# Patient Record
Sex: FEMALE | Race: WHITE | NOT HISPANIC OR LATINO
[De-identification: names, ages, dates, MRNs, and addresses within clinical notes are randomized per-mention and may not be internally consistent; named-entity substitution may affect disease eponyms.]

---

## 2017-01-03 ENCOUNTER — RX RENEWAL (OUTPATIENT)
Age: 82
End: 2017-01-03

## 2017-02-01 ENCOUNTER — APPOINTMENT (OUTPATIENT)
Dept: INTERNAL MEDICINE | Facility: CLINIC | Age: 82
End: 2017-02-01

## 2017-02-01 VITALS
BODY MASS INDEX: 28.13 KG/M2 | DIASTOLIC BLOOD PRESSURE: 76 MMHG | TEMPERATURE: 99.5 F | SYSTOLIC BLOOD PRESSURE: 124 MMHG | OXYGEN SATURATION: 91 % | HEART RATE: 77 BPM | WEIGHT: 134 LBS | HEIGHT: 58 IN

## 2017-02-01 DIAGNOSIS — E55.9 VITAMIN D DEFICIENCY, UNSPECIFIED: ICD-10-CM

## 2017-02-01 DIAGNOSIS — R94.6 ABNORMAL RESULTS OF THYROID FUNCTION STUDIES: ICD-10-CM

## 2017-02-02 LAB
25(OH)D3 SERPL-MCNC: 35.3 NG/ML
ALBUMIN SERPL ELPH-MCNC: 4 G/DL
ALP BLD-CCNC: 89 U/L
ALT SERPL-CCNC: 11 U/L
ANION GAP SERPL CALC-SCNC: 13 MMOL/L
AST SERPL-CCNC: 20 U/L
BASOPHILS # BLD AUTO: 0.07 K/UL
BASOPHILS NFR BLD AUTO: 0.6 %
BILIRUB SERPL-MCNC: 0.3 MG/DL
BUN SERPL-MCNC: 25 MG/DL
CALCIUM SERPL-MCNC: 10.2 MG/DL
CHLORIDE SERPL-SCNC: 103 MMOL/L
CHOLEST SERPL-MCNC: 187 MG/DL
CHOLEST/HDLC SERPL: 2.6 RATIO
CO2 SERPL-SCNC: 26 MMOL/L
CREAT SERPL-MCNC: 1.19 MG/DL
EOSINOPHIL # BLD AUTO: 0.31 K/UL
EOSINOPHIL NFR BLD AUTO: 2.6 %
FOLATE SERPL-MCNC: 7.7 NG/ML
GLUCOSE SERPL-MCNC: 92 MG/DL
HBA1C MFR BLD HPLC: 6 %
HCT VFR BLD CALC: 39.8 %
HDLC SERPL-MCNC: 73 MG/DL
HGB BLD-MCNC: 12.4 G/DL
IMM GRANULOCYTES NFR BLD AUTO: 1 %
LDLC SERPL CALC-MCNC: 88 MG/DL
LYMPHOCYTES # BLD AUTO: 3.03 K/UL
LYMPHOCYTES NFR BLD AUTO: 25.6 %
MAN DIFF?: NORMAL
MCHC RBC-ENTMCNC: 29.5 PG
MCHC RBC-ENTMCNC: 31.2 GM/DL
MCV RBC AUTO: 94.8 FL
MONOCYTES # BLD AUTO: 1.22 K/UL
MONOCYTES NFR BLD AUTO: 10.3 %
NEUTROPHILS # BLD AUTO: 7.08 K/UL
NEUTROPHILS NFR BLD AUTO: 59.9 %
PLATELET # BLD AUTO: 228 K/UL
POTASSIUM SERPL-SCNC: 4.9 MMOL/L
PROT SERPL-MCNC: 7.1 G/DL
RBC # BLD: 4.2 M/UL
RBC # FLD: 14.7 %
SODIUM SERPL-SCNC: 142 MMOL/L
T4 SERPL-MCNC: 7.6 UG/DL
TRIGL SERPL-MCNC: 129 MG/DL
TSH SERPL-ACNC: 6.81 UIU/ML
VIT B12 SERPL-MCNC: 498 PG/ML
WBC # FLD AUTO: 11.83 K/UL

## 2017-02-07 ENCOUNTER — MEDICATION RENEWAL (OUTPATIENT)
Age: 82
End: 2017-02-07

## 2017-02-21 ENCOUNTER — RX RENEWAL (OUTPATIENT)
Age: 82
End: 2017-02-21

## 2017-02-22 ENCOUNTER — MEDICATION RENEWAL (OUTPATIENT)
Age: 82
End: 2017-02-22

## 2017-03-21 ENCOUNTER — APPOINTMENT (OUTPATIENT)
Dept: INTERNAL MEDICINE | Facility: CLINIC | Age: 82
End: 2017-03-21

## 2017-03-21 VITALS
OXYGEN SATURATION: 92 % | DIASTOLIC BLOOD PRESSURE: 69 MMHG | TEMPERATURE: 98.3 F | HEIGHT: 58 IN | WEIGHT: 139 LBS | BODY MASS INDEX: 29.18 KG/M2 | SYSTOLIC BLOOD PRESSURE: 148 MMHG | HEART RATE: 65 BPM

## 2017-03-21 DIAGNOSIS — R07.89 OTHER CHEST PAIN: ICD-10-CM

## 2017-03-22 LAB
T4 FREE SERPL-MCNC: 1.4 NG/DL
TSH SERPL-ACNC: 1.8 UIU/ML

## 2017-05-02 ENCOUNTER — APPOINTMENT (OUTPATIENT)
Dept: INTERNAL MEDICINE | Facility: CLINIC | Age: 82
End: 2017-05-02

## 2017-05-02 ENCOUNTER — RX RENEWAL (OUTPATIENT)
Age: 82
End: 2017-05-02

## 2017-05-05 ENCOUNTER — MEDICATION RENEWAL (OUTPATIENT)
Age: 82
End: 2017-05-05

## 2017-06-28 ENCOUNTER — MEDICATION RENEWAL (OUTPATIENT)
Age: 82
End: 2017-06-28

## 2017-07-10 ENCOUNTER — APPOINTMENT (OUTPATIENT)
Dept: INTERNAL MEDICINE | Facility: CLINIC | Age: 82
End: 2017-07-10

## 2017-07-10 VITALS
WEIGHT: 137 LBS | DIASTOLIC BLOOD PRESSURE: 73 MMHG | TEMPERATURE: 98.3 F | HEIGHT: 58 IN | BODY MASS INDEX: 28.76 KG/M2 | SYSTOLIC BLOOD PRESSURE: 145 MMHG | OXYGEN SATURATION: 94 % | HEART RATE: 70 BPM

## 2017-07-10 RX ORDER — METOPROLOL SUCCINATE 25 MG/1
25 TABLET, EXTENDED RELEASE ORAL DAILY
Qty: 30 | Refills: 5 | Status: DISCONTINUED | COMMUNITY
Start: 2017-03-21 | End: 2017-07-10

## 2017-08-28 ENCOUNTER — MOBILE ON CALL (OUTPATIENT)
Age: 82
End: 2017-08-28

## 2017-09-02 ENCOUNTER — RX RENEWAL (OUTPATIENT)
Age: 82
End: 2017-09-02

## 2017-09-04 ENCOUNTER — MOBILE ON CALL (OUTPATIENT)
Age: 82
End: 2017-09-04

## 2017-09-07 ENCOUNTER — MEDICATION RENEWAL (OUTPATIENT)
Age: 82
End: 2017-09-07

## 2017-09-07 ENCOUNTER — MOBILE ON CALL (OUTPATIENT)
Age: 82
End: 2017-09-07

## 2017-11-04 ENCOUNTER — RX RENEWAL (OUTPATIENT)
Age: 82
End: 2017-11-04

## 2017-12-01 ENCOUNTER — MEDICATION RENEWAL (OUTPATIENT)
Age: 82
End: 2017-12-01

## 2017-12-01 ENCOUNTER — RX RENEWAL (OUTPATIENT)
Age: 82
End: 2017-12-01

## 2017-12-30 ENCOUNTER — MOBILE ON CALL (OUTPATIENT)
Age: 82
End: 2017-12-30

## 2018-01-01 ENCOUNTER — RX RENEWAL (OUTPATIENT)
Age: 83
End: 2018-01-01

## 2018-02-02 ENCOUNTER — MEDICATION RENEWAL (OUTPATIENT)
Age: 83
End: 2018-02-02

## 2018-02-05 ENCOUNTER — RX RENEWAL (OUTPATIENT)
Age: 83
End: 2018-02-05

## 2018-02-05 RX ORDER — ZOLPIDEM TARTRATE 10 MG/1
10 TABLET ORAL
Qty: 30 | Refills: 0 | Status: DISCONTINUED | COMMUNITY
Start: 2017-01-03 | End: 2018-02-05

## 2018-02-12 ENCOUNTER — APPOINTMENT (OUTPATIENT)
Dept: INTERNAL MEDICINE | Facility: CLINIC | Age: 83
End: 2018-02-12
Payer: MEDICARE

## 2018-02-12 PROCEDURE — G0008: CPT

## 2018-02-12 PROCEDURE — 90662 IIV NO PRSV INCREASED AG IM: CPT

## 2018-03-02 ENCOUNTER — APPOINTMENT (OUTPATIENT)
Dept: INTERNAL MEDICINE | Facility: CLINIC | Age: 83
End: 2018-03-02

## 2018-03-15 ENCOUNTER — LABORATORY RESULT (OUTPATIENT)
Age: 83
End: 2018-03-15

## 2018-03-15 ENCOUNTER — APPOINTMENT (OUTPATIENT)
Dept: INTERNAL MEDICINE | Facility: CLINIC | Age: 83
End: 2018-03-15
Payer: MEDICARE

## 2018-03-15 VITALS
DIASTOLIC BLOOD PRESSURE: 73 MMHG | HEART RATE: 72 BPM | WEIGHT: 132 LBS | OXYGEN SATURATION: 94 % | HEIGHT: 58 IN | BODY MASS INDEX: 27.71 KG/M2 | TEMPERATURE: 97.5 F | SYSTOLIC BLOOD PRESSURE: 113 MMHG

## 2018-03-15 DIAGNOSIS — Z92.29 PERSONAL HISTORY OF OTHER DRUG THERAPY: ICD-10-CM

## 2018-03-15 PROCEDURE — 93000 ELECTROCARDIOGRAM COMPLETE: CPT

## 2018-03-15 PROCEDURE — G0439: CPT

## 2018-03-15 PROCEDURE — 36415 COLL VENOUS BLD VENIPUNCTURE: CPT

## 2018-03-15 PROCEDURE — 99214 OFFICE O/P EST MOD 30 MIN: CPT | Mod: 25

## 2018-03-15 RX ORDER — LEVOTHYROXINE SODIUM 0.03 MG/1
25 TABLET ORAL
Qty: 90 | Refills: 3 | Status: DISCONTINUED | COMMUNITY
Start: 2017-02-07 | End: 2018-03-15

## 2018-03-15 RX ORDER — CITALOPRAM HYDROBROMIDE 20 MG/1
20 TABLET, FILM COATED ORAL DAILY
Qty: 135 | Refills: 2 | Status: DISCONTINUED | COMMUNITY
Start: 2017-11-04 | End: 2018-03-15

## 2018-03-15 RX ORDER — TRAZODONE HYDROCHLORIDE 100 MG/1
100 TABLET ORAL
Qty: 60 | Refills: 5 | Status: DISCONTINUED | COMMUNITY
Start: 2017-09-07 | End: 2018-03-15

## 2018-03-16 LAB
25(OH)D3 SERPL-MCNC: 36.6 NG/ML
BASOPHILS # BLD AUTO: 0.05 K/UL
BASOPHILS NFR BLD AUTO: 0.5 %
CHOLEST SERPL-MCNC: 255 MG/DL
CHOLEST/HDLC SERPL: 4 RATIO
EOSINOPHIL # BLD AUTO: 0.24 K/UL
EOSINOPHIL NFR BLD AUTO: 2.2 %
HBA1C MFR BLD HPLC: 5.6 %
HCT VFR BLD CALC: 40.2 %
HDLC SERPL-MCNC: 64 MG/DL
HGB BLD-MCNC: 12.8 G/DL
IMM GRANULOCYTES NFR BLD AUTO: 0.8 %
LDLC SERPL CALC-MCNC: 142 MG/DL
LYMPHOCYTES # BLD AUTO: 2.55 K/UL
LYMPHOCYTES NFR BLD AUTO: 23.2 %
MAN DIFF?: NORMAL
MCHC RBC-ENTMCNC: 30 PG
MCHC RBC-ENTMCNC: 31.8 GM/DL
MCV RBC AUTO: 94.4 FL
MONOCYTES # BLD AUTO: 1.15 K/UL
MONOCYTES NFR BLD AUTO: 10.4 %
NEUTROPHILS # BLD AUTO: 6.93 K/UL
NEUTROPHILS NFR BLD AUTO: 62.9 %
PLATELET # BLD AUTO: 204 K/UL
RBC # BLD: 4.26 M/UL
RBC # FLD: 14.5 %
T4 SERPL-MCNC: 6.7 UG/DL
TRIGL SERPL-MCNC: 247 MG/DL
TSH SERPL-ACNC: 3.45 UIU/ML
WBC # FLD AUTO: 11.01 K/UL

## 2018-03-26 ENCOUNTER — MEDICATION RENEWAL (OUTPATIENT)
Age: 83
End: 2018-03-26

## 2018-05-10 ENCOUNTER — APPOINTMENT (OUTPATIENT)
Dept: INTERNAL MEDICINE | Facility: CLINIC | Age: 83
End: 2018-05-10
Payer: MEDICARE

## 2018-05-10 VITALS — BODY MASS INDEX: 26.45 KG/M2 | WEIGHT: 126 LBS | HEIGHT: 58 IN

## 2018-05-10 VITALS
SYSTOLIC BLOOD PRESSURE: 132 MMHG | OXYGEN SATURATION: 94 % | DIASTOLIC BLOOD PRESSURE: 67 MMHG | HEART RATE: 72 BPM | TEMPERATURE: 98.5 F

## 2018-05-10 PROCEDURE — 99213 OFFICE O/P EST LOW 20 MIN: CPT | Mod: 25

## 2018-05-10 PROCEDURE — 36415 COLL VENOUS BLD VENIPUNCTURE: CPT

## 2018-05-10 RX ORDER — ATORVASTATIN CALCIUM 10 MG/1
10 TABLET, FILM COATED ORAL
Qty: 90 | Refills: 0 | Status: COMPLETED | COMMUNITY
Start: 2018-03-05

## 2018-05-10 RX ORDER — MIRTAZAPINE 7.5 MG/1
7.5 TABLET, FILM COATED ORAL
Qty: 30 | Refills: 5 | Status: DISCONTINUED | COMMUNITY
Start: 2018-03-15 | End: 2018-05-10

## 2018-05-12 LAB
CHOLEST SERPL-MCNC: 144 MG/DL
CHOLEST/HDLC SERPL: 2.1 RATIO
HDLC SERPL-MCNC: 69 MG/DL
LDLC SERPL CALC-MCNC: 45 MG/DL
TRIGL SERPL-MCNC: 149 MG/DL

## 2018-06-19 ENCOUNTER — MEDICATION RENEWAL (OUTPATIENT)
Age: 83
End: 2018-06-19

## 2018-07-03 ENCOUNTER — MEDICATION RENEWAL (OUTPATIENT)
Age: 83
End: 2018-07-03

## 2018-07-05 ENCOUNTER — MEDICATION RENEWAL (OUTPATIENT)
Age: 83
End: 2018-07-05

## 2018-07-12 ENCOUNTER — APPOINTMENT (OUTPATIENT)
Dept: INTERNAL MEDICINE | Facility: CLINIC | Age: 83
End: 2018-07-12
Payer: MEDICARE

## 2018-07-12 VITALS
HEART RATE: 74 BPM | WEIGHT: 128 LBS | TEMPERATURE: 98.3 F | OXYGEN SATURATION: 94 % | BODY MASS INDEX: 26.87 KG/M2 | HEIGHT: 58 IN | DIASTOLIC BLOOD PRESSURE: 70 MMHG | SYSTOLIC BLOOD PRESSURE: 117 MMHG

## 2018-07-12 PROCEDURE — 99213 OFFICE O/P EST LOW 20 MIN: CPT

## 2018-07-12 NOTE — PLAN
[FreeTextEntry1] : Some diarrhea\par poor sleep\par lost ambien????\par \par xanax 0.25 at bedtime for 5 nights\par \par lower aricept from 10 mg to 5 mg\par no ambient until months since prescribed

## 2018-07-12 NOTE — HISTORY OF PRESENT ILLNESS
[FreeTextEntry1] : Feel weak [de-identified] : Feel weak 2 day\par some diarrhea  2 days\par lost ambien 5\par  not sleeping\par on celexa and donepezil

## 2018-07-25 ENCOUNTER — MEDICATION RENEWAL (OUTPATIENT)
Age: 83
End: 2018-07-25

## 2018-08-13 ENCOUNTER — APPOINTMENT (OUTPATIENT)
Dept: INTERNAL MEDICINE | Facility: CLINIC | Age: 83
End: 2018-08-13

## 2018-08-20 ENCOUNTER — MEDICATION RENEWAL (OUTPATIENT)
Age: 83
End: 2018-08-20

## 2018-08-30 ENCOUNTER — APPOINTMENT (OUTPATIENT)
Dept: INTERNAL MEDICINE | Facility: CLINIC | Age: 83
End: 2018-08-30
Payer: MEDICARE

## 2018-08-30 VITALS
HEIGHT: 58 IN | SYSTOLIC BLOOD PRESSURE: 130 MMHG | BODY MASS INDEX: 27.29 KG/M2 | WEIGHT: 130 LBS | DIASTOLIC BLOOD PRESSURE: 80 MMHG

## 2018-08-30 PROCEDURE — 99214 OFFICE O/P EST MOD 30 MIN: CPT

## 2018-08-30 NOTE — HISTORY OF PRESENT ILLNESS
[FreeTextEntry1] : facial pain [de-identified] : left facial and periorbital pain 1 week\par severe and no rash\par no nasal congestion

## 2018-08-30 NOTE — PHYSICAL EXAM
[No Acute Distress] : no acute distress [Well Nourished] : well nourished [Normal Outer Ear/Nose] : the outer ears and nose were normal in appearance [Normal Oropharynx] : the oropharynx was normal [No Respiratory Distress] : no respiratory distress  [Clear to Auscultation] : lungs were clear to auscultation bilaterally [Normal Rate] : normal rate  [Soft] : abdomen soft [No HSM] : no HSM [de-identified] : not congested

## 2018-08-30 NOTE — REVIEW OF SYSTEMS
[Headache] : headache [Memory Loss] : memory loss [Fever] : no fever [Night Sweats] : no night sweats [Hearing Loss] : no hearing loss [Nasal Discharge] : no nasal discharge [Chest Pain] : no chest pain [Orthopnea] : no orthopnea [Wheezing] : no wheezing [Dizziness] : no dizziness [Fainting] : no fainting

## 2018-08-30 NOTE — PLAN
[FreeTextEntry1] : no rash possible shingles????\par no evidence of sinus issue\par \par will try famvir 250 tid for 7 days\par gabapentum 100 bid\par ct of head and sinus\par if no better f/u opth and mri head

## 2018-09-05 ENCOUNTER — MEDICATION RENEWAL (OUTPATIENT)
Age: 83
End: 2018-09-05

## 2018-09-06 ENCOUNTER — FORM ENCOUNTER (OUTPATIENT)
Age: 83
End: 2018-09-06

## 2018-09-07 ENCOUNTER — APPOINTMENT (OUTPATIENT)
Dept: CT IMAGING | Facility: IMAGING CENTER | Age: 83
End: 2018-09-07
Payer: MEDICARE

## 2018-09-07 ENCOUNTER — OUTPATIENT (OUTPATIENT)
Dept: OUTPATIENT SERVICES | Facility: HOSPITAL | Age: 83
LOS: 1 days | End: 2018-09-07
Payer: MEDICARE

## 2018-09-07 DIAGNOSIS — R51 HEADACHE: ICD-10-CM

## 2018-09-07 PROCEDURE — 70486 CT MAXILLOFACIAL W/O DYE: CPT

## 2018-09-07 PROCEDURE — 70450 CT HEAD/BRAIN W/O DYE: CPT

## 2018-09-07 PROCEDURE — 70486 CT MAXILLOFACIAL W/O DYE: CPT | Mod: 26

## 2018-09-07 PROCEDURE — 70450 CT HEAD/BRAIN W/O DYE: CPT | Mod: 26

## 2018-09-14 ENCOUNTER — APPOINTMENT (OUTPATIENT)
Dept: INTERNAL MEDICINE | Facility: CLINIC | Age: 83
End: 2018-09-14
Payer: MEDICARE

## 2018-09-14 VITALS
TEMPERATURE: 98.2 F | HEIGHT: 58 IN | HEART RATE: 74 BPM | DIASTOLIC BLOOD PRESSURE: 82 MMHG | WEIGHT: 130 LBS | BODY MASS INDEX: 27.29 KG/M2 | SYSTOLIC BLOOD PRESSURE: 124 MMHG | OXYGEN SATURATION: 96 %

## 2018-09-14 DIAGNOSIS — R51 HEADACHE: ICD-10-CM

## 2018-09-14 PROCEDURE — 36415 COLL VENOUS BLD VENIPUNCTURE: CPT

## 2018-09-14 PROCEDURE — 99214 OFFICE O/P EST MOD 30 MIN: CPT | Mod: 25

## 2018-09-14 RX ORDER — FAMCICLOVIR 250 MG/1
250 TABLET, FILM COATED ORAL
Qty: 21 | Refills: 0 | Status: DISCONTINUED | COMMUNITY
Start: 2018-08-30 | End: 2018-09-14

## 2018-09-14 NOTE — PHYSICAL EXAM
[No Acute Distress] : no acute distress [Well Nourished] : well nourished [Normal Outer Ear/Nose] : the outer ears and nose were normal in appearance [Normal Oropharynx] : the oropharynx was normal [No JVD] : no jugular venous distention [Supple] : supple [No Respiratory Distress] : no respiratory distress  [Clear to Auscultation] : lungs were clear to auscultation bilaterally [Normal Rate] : normal rate  [Regular Rhythm] : with a regular rhythm [Soft] : abdomen soft [No HSM] : no HSM [de-identified] : temporal arteries normal

## 2018-09-14 NOTE — REVIEW OF SYSTEMS
[Fever] : no fever [Night Sweats] : no night sweats [Discharge] : no discharge [Earache] : no earache [Nasal Discharge] : no nasal discharge [Chest Pain] : no chest pain [Orthopnea] : no orthopnea [Shortness Of Breath] : no shortness of breath [Wheezing] : no wheezing

## 2018-09-14 NOTE — PLAN
[FreeTextEntry1] : radha orbital pain\par will check sed and crp doubt TZ\par Blood pressure good\par flu given\par

## 2018-09-14 NOTE — HISTORY OF PRESENT ILLNESS
[FreeTextEntry1] : here for f/u right radha orbital pain [de-identified] : right radha orbital pain\par poor memory of prior events\par pain now 4/10 and intermittent  ?worse before\par treated with 1 week of famvir and gabapentum now off of\par no rash\par ct of head neg

## 2018-09-16 LAB
25(OH)D3 SERPL-MCNC: 40.9 NG/ML
ALBUMIN SERPL ELPH-MCNC: 4.1 G/DL
ALP BLD-CCNC: 95 U/L
ALT SERPL-CCNC: 16 U/L
ANION GAP SERPL CALC-SCNC: 14 MMOL/L
AST SERPL-CCNC: 24 U/L
BASOPHILS # BLD AUTO: 0.06 K/UL
BASOPHILS NFR BLD AUTO: 0.7 %
BILIRUB SERPL-MCNC: 0.4 MG/DL
BUN SERPL-MCNC: 17 MG/DL
CALCIUM SERPL-MCNC: 10.5 MG/DL
CHLORIDE SERPL-SCNC: 101 MMOL/L
CHOLEST SERPL-MCNC: 161 MG/DL
CHOLEST/HDLC SERPL: 2.3 RATIO
CO2 SERPL-SCNC: 27 MMOL/L
CREAT SERPL-MCNC: 1.07 MG/DL
CRP SERPL-MCNC: <0.1 MG/DL
EOSINOPHIL # BLD AUTO: 0.14 K/UL
EOSINOPHIL NFR BLD AUTO: 1.6 %
ERYTHROCYTE [SEDIMENTATION RATE] IN BLOOD BY WESTERGREN METHOD: 10 MM/HR
GLUCOSE SERPL-MCNC: 99 MG/DL
HBA1C MFR BLD HPLC: 5.7 %
HCT VFR BLD CALC: 40.2 %
HDLC SERPL-MCNC: 69 MG/DL
HGB BLD-MCNC: 13.1 G/DL
IMM GRANULOCYTES NFR BLD AUTO: 0.7 %
LDLC SERPL CALC-MCNC: 54 MG/DL
LYMPHOCYTES # BLD AUTO: 2.35 K/UL
LYMPHOCYTES NFR BLD AUTO: 26.7 %
MAN DIFF?: NORMAL
MCHC RBC-ENTMCNC: 30.8 PG
MCHC RBC-ENTMCNC: 32.6 GM/DL
MCV RBC AUTO: 94.6 FL
MONOCYTES # BLD AUTO: 0.69 K/UL
MONOCYTES NFR BLD AUTO: 7.8 %
NEUTROPHILS # BLD AUTO: 5.5 K/UL
NEUTROPHILS NFR BLD AUTO: 62.5 %
PLATELET # BLD AUTO: 215 K/UL
POTASSIUM SERPL-SCNC: 5.2 MMOL/L
PROT SERPL-MCNC: 7 G/DL
RBC # BLD: 4.25 M/UL
RBC # FLD: 14.8 %
SODIUM SERPL-SCNC: 142 MMOL/L
T4 SERPL-MCNC: 7 UG/DL
TRIGL SERPL-MCNC: 192 MG/DL
TSH SERPL-ACNC: 2.97 UIU/ML
WBC # FLD AUTO: 8.8 K/UL

## 2018-10-24 ENCOUNTER — MEDICATION RENEWAL (OUTPATIENT)
Age: 83
End: 2018-10-24

## 2018-11-16 ENCOUNTER — MEDICATION RENEWAL (OUTPATIENT)
Age: 83
End: 2018-11-16

## 2019-01-17 ENCOUNTER — MEDICATION RENEWAL (OUTPATIENT)
Age: 84
End: 2019-01-17

## 2019-03-12 ENCOUNTER — MEDICATION RENEWAL (OUTPATIENT)
Age: 84
End: 2019-03-12

## 2019-03-14 ENCOUNTER — APPOINTMENT (OUTPATIENT)
Dept: INTERNAL MEDICINE | Facility: CLINIC | Age: 84
End: 2019-03-14

## 2019-03-27 ENCOUNTER — APPOINTMENT (OUTPATIENT)
Dept: INTERNAL MEDICINE | Facility: CLINIC | Age: 84
End: 2019-03-27
Payer: MEDICARE

## 2019-03-27 VITALS
HEIGHT: 58 IN | HEART RATE: 74 BPM | DIASTOLIC BLOOD PRESSURE: 71 MMHG | OXYGEN SATURATION: 98 % | WEIGHT: 132 LBS | SYSTOLIC BLOOD PRESSURE: 119 MMHG | BODY MASS INDEX: 27.71 KG/M2 | TEMPERATURE: 98.1 F

## 2019-03-27 DIAGNOSIS — Z86.69 PERSONAL HISTORY OF OTHER DISEASES OF THE NERVOUS SYSTEM AND SENSE ORGANS: ICD-10-CM

## 2019-03-27 DIAGNOSIS — H40.9 UNSPECIFIED GLAUCOMA: ICD-10-CM

## 2019-03-27 DIAGNOSIS — R41.3 OTHER AMNESIA: ICD-10-CM

## 2019-03-27 DIAGNOSIS — J06.9 ACUTE UPPER RESPIRATORY INFECTION, UNSPECIFIED: ICD-10-CM

## 2019-03-27 PROCEDURE — 99214 OFFICE O/P EST MOD 30 MIN: CPT | Mod: 25

## 2019-03-27 PROCEDURE — 36415 COLL VENOUS BLD VENIPUNCTURE: CPT

## 2019-03-27 PROCEDURE — 93000 ELECTROCARDIOGRAM COMPLETE: CPT

## 2019-03-27 RX ORDER — MEMANTINE HYDROCHLORIDE 28 MG/1
28 CAPSULE, EXTENDED RELEASE ORAL
Refills: 0 | Status: COMPLETED | COMMUNITY
Start: 2019-03-27

## 2019-03-27 RX ORDER — ALPRAZOLAM 0.25 MG/1
0.25 TABLET ORAL AT BEDTIME
Qty: 5 | Refills: 0 | Status: DISCONTINUED | COMMUNITY
Start: 2018-07-12 | End: 2019-03-27

## 2019-03-27 RX ORDER — GABAPENTIN 100 MG/1
100 CAPSULE ORAL TWICE DAILY
Qty: 20 | Refills: 0 | Status: DISCONTINUED | COMMUNITY
Start: 2018-08-30 | End: 2019-03-27

## 2019-03-27 RX ORDER — CITALOPRAM HYDROBROMIDE 20 MG/1
20 TABLET, FILM COATED ORAL DAILY
Qty: 135 | Refills: 2 | Status: DISCONTINUED | COMMUNITY
Start: 2017-02-22 | End: 2019-03-27

## 2019-03-27 RX ORDER — TRAZODONE HYDROCHLORIDE 100 MG/1
100 TABLET ORAL
Qty: 60 | Refills: 5 | Status: DISCONTINUED | COMMUNITY
Start: 2018-01-01 | End: 2019-03-27

## 2019-03-27 NOTE — HEALTH RISK ASSESSMENT
[Good] : ~his/her~  mood as  good [One fall no injury in past year] : Patient reported one fall in the past year without injury [2] : 2) Feeling down, depressed, or hopeless for more than half of the days (2) [None] : None [Alone] : lives alone [Retired] : retired [Graduate School] : graduate school [] :  [Fully functional (bathing, dressing, toileting, transferring, walking, feeding)] : Fully functional (bathing, dressing, toileting, transferring, walking, feeding) [Independent] : using telephone [Full assistance needed] : managing finances [Smoke Detector] : smoke detector [Carbon Monoxide Detector] : carbon monoxide detector [Seat Belt] :  uses seat belt [PPC1Yulek] : 4 [QTA1Vgjuc] : 8 [Change in mental status noted] : No change in mental status noted [Language] : denies difficulty with language [Behavior] : denies difficulty with behavior [Learning/Retaining New Information] : denies difficulty learning/retaining new information [Handling Complex Tasks] : denies difficulty handling complex tasks [Reasoning] : denies difficulty with reasoning [Spatial Ability and Orientation] : denies difficulty with spatial ability and orientation [Reports changes in hearing] : Reports no changes in hearing [Reports changes in vision] : Reports no changes in vision [Reports changes in dental health] : Reports no changes in dental health [Guns at Home] : no guns at home

## 2019-03-27 NOTE — REVIEW OF SYSTEMS
[Memory Loss] : memory loss [Unsteady Walking] : ataxia [Fever] : no fever [Earache] : no earache [Nasal Discharge] : no nasal discharge [Chest Pain] : no chest pain [Orthopnea] : no orthopnea [Shortness Of Breath] : no shortness of breath [Wheezing] : no wheezing [Abdominal Pain] : no abdominal pain [Vomiting] : no vomiting [Dysuria] : no dysuria [Confusion] : no confusion

## 2019-03-27 NOTE — ASSESSMENT
[FreeTextEntry1] : Annual exam\par had vaccine\par \par \par depression\par stop trazadone and celexa\par start remeron 15\par full blood\par ekg ok\par \par Schneetza is the aide and trustworthy

## 2019-03-27 NOTE — PHYSICAL EXAM
[No Acute Distress] : no acute distress [Well Nourished] : well nourished [Normal Outer Ear/Nose] : the outer ears and nose were normal in appearance [Normal Oropharynx] : the oropharynx was normal [No JVD] : no jugular venous distention [Supple] : supple [No Respiratory Distress] : no respiratory distress  [Clear to Auscultation] : lungs were clear to auscultation bilaterally [Normal Rate] : normal rate  [Regular Rhythm] : with a regular rhythm [No Edema] : there was no peripheral edema [No HSM] : no HSM [Normal Bowel Sounds] : normal bowel sounds

## 2019-03-27 NOTE — HISTORY OF PRESENT ILLNESS
[FreeTextEntry1] : Annual exam [de-identified] : Annual exam\par Had all vaccine\par aged out of other health maintenance\par \par memory issue see Dr Leon\par trouble with sleep and depression

## 2019-03-28 LAB
25(OH)D3 SERPL-MCNC: 40.6 NG/ML
ALBUMIN SERPL ELPH-MCNC: 4.1 G/DL
ALP BLD-CCNC: 104 U/L
ALT SERPL-CCNC: 15 U/L
ANION GAP SERPL CALC-SCNC: 10 MMOL/L
AST SERPL-CCNC: 22 U/L
BASOPHILS # BLD AUTO: 0.09 K/UL
BASOPHILS NFR BLD AUTO: 0.8 %
BILIRUB SERPL-MCNC: 0.3 MG/DL
BUN SERPL-MCNC: 22 MG/DL
CALCIUM SERPL-MCNC: 10.6 MG/DL
CHLORIDE SERPL-SCNC: 106 MMOL/L
CHOLEST SERPL-MCNC: 197 MG/DL
CHOLEST/HDLC SERPL: 2.8 RATIO
CO2 SERPL-SCNC: 26 MMOL/L
CREAT SERPL-MCNC: 1.11 MG/DL
EOSINOPHIL # BLD AUTO: 0.31 K/UL
EOSINOPHIL NFR BLD AUTO: 2.8 %
ESTIMATED AVERAGE GLUCOSE: 114 MG/DL
GLUCOSE SERPL-MCNC: 93 MG/DL
HBA1C MFR BLD HPLC: 5.6 %
HCT VFR BLD CALC: 42.1 %
HDLC SERPL-MCNC: 70 MG/DL
HGB BLD-MCNC: 13.1 G/DL
IMM GRANULOCYTES NFR BLD AUTO: 0.8 %
LDLC SERPL CALC-MCNC: 88 MG/DL
LYMPHOCYTES # BLD AUTO: 2.85 K/UL
LYMPHOCYTES NFR BLD AUTO: 26 %
MAN DIFF?: NORMAL
MCHC RBC-ENTMCNC: 30 PG
MCHC RBC-ENTMCNC: 31.1 GM/DL
MCV RBC AUTO: 96.3 FL
MONOCYTES # BLD AUTO: 0.97 K/UL
MONOCYTES NFR BLD AUTO: 8.8 %
NEUTROPHILS # BLD AUTO: 6.67 K/UL
NEUTROPHILS NFR BLD AUTO: 60.8 %
PLATELET # BLD AUTO: 210 K/UL
POTASSIUM SERPL-SCNC: 5.1 MMOL/L
PROT SERPL-MCNC: 6.8 G/DL
RBC # BLD: 4.37 M/UL
RBC # FLD: 14.2 %
SODIUM SERPL-SCNC: 142 MMOL/L
T4 SERPL-MCNC: 6.3 UG/DL
TRIGL SERPL-MCNC: 195 MG/DL
TSH SERPL-ACNC: 3.13 UIU/ML
WBC # FLD AUTO: 10.98 K/UL

## 2019-04-08 ENCOUNTER — RX RENEWAL (OUTPATIENT)
Age: 84
End: 2019-04-08

## 2019-05-02 ENCOUNTER — APPOINTMENT (OUTPATIENT)
Dept: INTERNAL MEDICINE | Facility: CLINIC | Age: 84
End: 2019-05-02

## 2019-06-18 ENCOUNTER — MEDICATION RENEWAL (OUTPATIENT)
Age: 84
End: 2019-06-18

## 2019-06-24 ENCOUNTER — MEDICATION RENEWAL (OUTPATIENT)
Age: 84
End: 2019-06-24

## 2019-07-02 ENCOUNTER — MEDICATION RENEWAL (OUTPATIENT)
Age: 84
End: 2019-07-02

## 2019-08-23 ENCOUNTER — MOBILE ON CALL (OUTPATIENT)
Age: 84
End: 2019-08-23

## 2019-09-05 ENCOUNTER — MEDICATION RENEWAL (OUTPATIENT)
Age: 84
End: 2019-09-05

## 2019-09-06 ENCOUNTER — MEDICATION RENEWAL (OUTPATIENT)
Age: 84
End: 2019-09-06

## 2019-09-20 ENCOUNTER — APPOINTMENT (OUTPATIENT)
Dept: GERIATRICS | Facility: CLINIC | Age: 84
End: 2019-09-20

## 2019-09-20 VITALS
HEART RATE: 83 BPM | WEIGHT: 138.7 LBS | OXYGEN SATURATION: 96 % | SYSTOLIC BLOOD PRESSURE: 136 MMHG | TEMPERATURE: 98.3 F | BODY MASS INDEX: 29.11 KG/M2 | RESPIRATION RATE: 16 BRPM | HEIGHT: 58 IN | DIASTOLIC BLOOD PRESSURE: 72 MMHG

## 2019-10-01 ENCOUNTER — APPOINTMENT (OUTPATIENT)
Dept: ORTHOPEDIC SURGERY | Facility: CLINIC | Age: 84
End: 2019-10-01

## 2019-10-11 ENCOUNTER — MEDICATION RENEWAL (OUTPATIENT)
Age: 84
End: 2019-10-11

## 2019-10-30 ENCOUNTER — MEDICATION RENEWAL (OUTPATIENT)
Age: 84
End: 2019-10-30

## 2019-12-18 ENCOUNTER — NON-APPOINTMENT (OUTPATIENT)
Age: 84
End: 2019-12-18

## 2019-12-18 ENCOUNTER — APPOINTMENT (OUTPATIENT)
Dept: INTERNAL MEDICINE | Facility: CLINIC | Age: 84
End: 2019-12-18
Payer: MEDICARE

## 2019-12-18 VITALS
DIASTOLIC BLOOD PRESSURE: 78 MMHG | SYSTOLIC BLOOD PRESSURE: 128 MMHG | WEIGHT: 130 LBS | HEART RATE: 76 BPM | BODY MASS INDEX: 27.29 KG/M2 | HEIGHT: 58 IN

## 2019-12-18 DIAGNOSIS — F41.8 OTHER SPECIFIED ANXIETY DISORDERS: ICD-10-CM

## 2019-12-18 DIAGNOSIS — G47.00 INSOMNIA, UNSPECIFIED: ICD-10-CM

## 2019-12-18 PROCEDURE — 99214 OFFICE O/P EST MOD 30 MIN: CPT | Mod: 25

## 2019-12-18 PROCEDURE — 86580 TB INTRADERMAL TEST: CPT

## 2019-12-18 PROCEDURE — 93000 ELECTROCARDIOGRAM COMPLETE: CPT

## 2019-12-18 PROCEDURE — G0439: CPT

## 2019-12-18 PROCEDURE — 90653 IIV ADJUVANT VACCINE IM: CPT

## 2019-12-18 PROCEDURE — G0008: CPT

## 2019-12-18 NOTE — HEALTH RISK ASSESSMENT
[Fair] :  ~his/her~ mood as fair [No] : No [No falls in past year] : Patient reported no falls in the past year [None] : None [Alone] : lives alone [Retired] : retired [High School] : high school [] :  [Feels Safe at Home] : Feels safe at home [Some assistance needed] : transferring [Independent] : using telephone [Full assistance needed] : managing finances [Carbon Monoxide Detector] : carbon monoxide detector [Smoke Detector] : smoke detector [Seat Belt] :  uses seat belt [[id number]] : 13 [Change in mental status noted] : No change in mental status noted [Language] : denies difficulty with language [Behavior] : denies difficulty with behavior [Learning/Retaining New Information] : denies difficulty learning/retaining new information [Handling Complex Tasks] : denies difficulty handling complex tasks [Reasoning] : denies difficulty with reasoning [Spatial Ability and Orientation] : denies difficulty with spatial ability and orientation [Reports changes in hearing] : Reports no changes in hearing [Reports changes in dental health] : Reports no changes in dental health [Reports changes in vision] : Reports no changes in vision [Guns at Home] : no guns at home

## 2019-12-18 NOTE — PLAN
[FreeTextEntry1] : Annual exam\par Flu given\par PPD applied\par Had prevnar\par \par \par \par Memory  on meds\par gerd controlled on meds\par glaucoma under treatment\par Insomnia on ambien\par depression on mirtapezine \par ekg  ok routine bloods\par cholesterol on meds\par

## 2019-12-18 NOTE — HISTORY OF PRESENT ILLNESS
[de-identified] : Annual exam\par flu today\par had prevnar\par aged out of other health maintenance\par \par Going into assisting living\par memory not great use walker when walking long distances\par Glaucoma on drops\par Insomnia requires ambien\par depression despite mirtapezine\par Gerd controlled by meds\par cholesterol on med\par \par

## 2019-12-18 NOTE — REVIEW OF SYSTEMS
[Fatigue] : fatigue [Night Sweats] : no night sweats [Fever] : no fever [Earache] : no earache [Nasal Discharge] : no nasal discharge [Chest Pain] : no chest pain [Orthopnea] : no orthopnea [Wheezing] : no wheezing [Shortness Of Breath] : no shortness of breath [Vomiting] : no vomiting [Abdominal Pain] : no abdominal pain

## 2019-12-18 NOTE — PHYSICAL EXAM
[No Acute Distress] : no acute distress [Well Nourished] : well nourished [Normal Outer Ear/Nose] : the outer ears and nose were normal in appearance [Normal Oropharynx] : the oropharynx was normal [No JVD] : no jugular venous distention [No Respiratory Distress] : no respiratory distress  [No Accessory Muscle Use] : no accessory muscle use [No Lymphadenopathy] : no lymphadenopathy [Normal Rate] : normal rate  [Regular Rhythm] : with a regular rhythm [No HSM] : no HSM [Soft] : abdomen soft

## 2019-12-19 DIAGNOSIS — E83.52 HYPERCALCEMIA: ICD-10-CM

## 2019-12-19 LAB
25(OH)D3 SERPL-MCNC: 53.5 NG/ML
ALBUMIN SERPL ELPH-MCNC: 4.3 G/DL
ALP BLD-CCNC: 111 U/L
ALT SERPL-CCNC: 13 U/L
ANION GAP SERPL CALC-SCNC: 9 MMOL/L
AST SERPL-CCNC: 21 U/L
BASOPHILS # BLD AUTO: 0.09 K/UL
BASOPHILS NFR BLD AUTO: 0.8 %
BILIRUB SERPL-MCNC: 0.2 MG/DL
BUN SERPL-MCNC: 22 MG/DL
CALCIUM SERPL-MCNC: 11.1 MG/DL
CALCIUM SERPL-MCNC: 11.1 MG/DL
CHLORIDE SERPL-SCNC: 102 MMOL/L
CHOLEST SERPL-MCNC: 153 MG/DL
CHOLEST/HDLC SERPL: 2.4 RATIO
CO2 SERPL-SCNC: 25 MMOL/L
CREAT SERPL-MCNC: 1.03 MG/DL
EOSINOPHIL # BLD AUTO: 0.22 K/UL
EOSINOPHIL NFR BLD AUTO: 1.9 %
ESTIMATED AVERAGE GLUCOSE: 114 MG/DL
GLUCOSE SERPL-MCNC: 79 MG/DL
HBA1C MFR BLD HPLC: 5.6 %
HCT VFR BLD CALC: 43.6 %
HDLC SERPL-MCNC: 64 MG/DL
HGB BLD-MCNC: 13.6 G/DL
IMM GRANULOCYTES NFR BLD AUTO: 0.7 %
LDLC SERPL CALC-MCNC: 57 MG/DL
LYMPHOCYTES # BLD AUTO: 2.51 K/UL
LYMPHOCYTES NFR BLD AUTO: 22.1 %
MAN DIFF?: NORMAL
MCHC RBC-ENTMCNC: 29.6 PG
MCHC RBC-ENTMCNC: 31.2 GM/DL
MCV RBC AUTO: 94.8 FL
MONOCYTES # BLD AUTO: 1.06 K/UL
MONOCYTES NFR BLD AUTO: 9.3 %
NEUTROPHILS # BLD AUTO: 7.38 K/UL
NEUTROPHILS NFR BLD AUTO: 65.2 %
PARATHYROID HORMONE INTACT: 70 PG/ML
PLATELET # BLD AUTO: 222 K/UL
POTASSIUM SERPL-SCNC: 5.1 MMOL/L
PROT SERPL-MCNC: 7.1 G/DL
RBC # BLD: 4.6 M/UL
RBC # FLD: 14.1 %
SODIUM SERPL-SCNC: 136 MMOL/L
T4 FREE SERPL-MCNC: 1.1 NG/DL
TRIGL SERPL-MCNC: 158 MG/DL
TSH SERPL-ACNC: 2.51 UIU/ML
WBC # FLD AUTO: 11.34 K/UL

## 2019-12-24 ENCOUNTER — MEDICATION RENEWAL (OUTPATIENT)
Age: 84
End: 2019-12-24

## 2019-12-24 RX ORDER — MEMANTINE HYDROCHLORIDE 28 MG/1
28 CAPSULE, EXTENDED RELEASE ORAL
Qty: 90 | Refills: 3 | Status: ACTIVE | OUTPATIENT
Start: 2019-03-27

## 2020-01-21 RX ORDER — ACETAMINOPHEN 325 MG
5000 TABLET ORAL
Qty: 100 | Refills: 2 | Status: ACTIVE | COMMUNITY
Start: 2020-01-21 | End: 1900-01-01

## 2020-02-03 ENCOUNTER — APPOINTMENT (OUTPATIENT)
Dept: OPHTHALMOLOGY | Facility: CLINIC | Age: 85
End: 2020-02-03

## 2020-06-25 RX ORDER — OMEPRAZOLE 20 MG/1
20 CAPSULE, DELAYED RELEASE ORAL
Qty: 30 | Refills: 5 | Status: ACTIVE | COMMUNITY
Start: 2017-03-21 | End: 1900-01-01

## 2020-06-25 RX ORDER — DONEPEZIL HYDROCHLORIDE 10 MG/1
10 TABLET ORAL
Qty: 30 | Refills: 5 | Status: ACTIVE | COMMUNITY
Start: 2017-02-21 | End: 1900-01-01

## 2021-06-07 ENCOUNTER — INPATIENT (INPATIENT)
Facility: HOSPITAL | Age: 86
LOS: 0 days | Discharge: NURSING, REHAB OR LONG-TERM CARE | DRG: 392 | End: 2021-06-08
Attending: STUDENT IN AN ORGANIZED HEALTH CARE EDUCATION/TRAINING PROGRAM | Admitting: HOSPITALIST
Payer: MEDICARE

## 2021-06-07 VITALS
DIASTOLIC BLOOD PRESSURE: 61 MMHG | TEMPERATURE: 99 F | HEIGHT: 59 IN | WEIGHT: 138.89 LBS | OXYGEN SATURATION: 94 % | HEART RATE: 86 BPM | SYSTOLIC BLOOD PRESSURE: 139 MMHG | RESPIRATION RATE: 18 BRPM

## 2021-06-07 LAB
ALBUMIN SERPL ELPH-MCNC: 4.2 G/DL — SIGNIFICANT CHANGE UP (ref 3.3–5)
ALP SERPL-CCNC: 103 U/L — SIGNIFICANT CHANGE UP (ref 40–120)
ALT FLD-CCNC: 23 U/L — SIGNIFICANT CHANGE UP (ref 10–45)
ANION GAP SERPL CALC-SCNC: 11 MMOL/L — SIGNIFICANT CHANGE UP (ref 5–17)
AST SERPL-CCNC: 27 U/L — SIGNIFICANT CHANGE UP (ref 10–40)
BASOPHILS # BLD AUTO: 0.08 K/UL — SIGNIFICANT CHANGE UP (ref 0–0.2)
BASOPHILS NFR BLD AUTO: 0.6 % — SIGNIFICANT CHANGE UP (ref 0–2)
BILIRUB SERPL-MCNC: 0.3 MG/DL — SIGNIFICANT CHANGE UP (ref 0.2–1.2)
BUN SERPL-MCNC: 27 MG/DL — HIGH (ref 7–23)
CALCIUM SERPL-MCNC: 10.8 MG/DL — HIGH (ref 8.4–10.5)
CHLORIDE SERPL-SCNC: 108 MMOL/L — SIGNIFICANT CHANGE UP (ref 96–108)
CO2 SERPL-SCNC: 23 MMOL/L — SIGNIFICANT CHANGE UP (ref 22–31)
CREAT SERPL-MCNC: 1.14 MG/DL — SIGNIFICANT CHANGE UP (ref 0.5–1.3)
EOSINOPHIL # BLD AUTO: 0.04 K/UL — SIGNIFICANT CHANGE UP (ref 0–0.5)
EOSINOPHIL NFR BLD AUTO: 0.3 % — SIGNIFICANT CHANGE UP (ref 0–6)
GLUCOSE SERPL-MCNC: 102 MG/DL — HIGH (ref 70–99)
HCT VFR BLD CALC: 39.2 % — SIGNIFICANT CHANGE UP (ref 34.5–45)
HGB BLD-MCNC: 12.5 G/DL — SIGNIFICANT CHANGE UP (ref 11.5–15.5)
IMM GRANULOCYTES NFR BLD AUTO: 0.7 % — SIGNIFICANT CHANGE UP (ref 0–1.5)
LACTATE BLDV-MCNC: 1.4 MMOL/L — SIGNIFICANT CHANGE UP (ref 0.7–2)
LIDOCAIN IGE QN: 42 U/L — SIGNIFICANT CHANGE UP (ref 7–60)
LYMPHOCYTES # BLD AUTO: 15.4 % — SIGNIFICANT CHANGE UP (ref 13–44)
LYMPHOCYTES # BLD AUTO: 2.1 K/UL — SIGNIFICANT CHANGE UP (ref 1–3.3)
MCHC RBC-ENTMCNC: 29.8 PG — SIGNIFICANT CHANGE UP (ref 27–34)
MCHC RBC-ENTMCNC: 31.9 GM/DL — LOW (ref 32–36)
MCV RBC AUTO: 93.6 FL — SIGNIFICANT CHANGE UP (ref 80–100)
MONOCYTES # BLD AUTO: 0.66 K/UL — SIGNIFICANT CHANGE UP (ref 0–0.9)
MONOCYTES NFR BLD AUTO: 4.8 % — SIGNIFICANT CHANGE UP (ref 2–14)
NEUTROPHILS # BLD AUTO: 10.68 K/UL — HIGH (ref 1.8–7.4)
NEUTROPHILS NFR BLD AUTO: 78.2 % — HIGH (ref 43–77)
NRBC # BLD: 0 /100 WBCS — SIGNIFICANT CHANGE UP (ref 0–0)
PLATELET # BLD AUTO: 191 K/UL — SIGNIFICANT CHANGE UP (ref 150–400)
POTASSIUM SERPL-MCNC: 4.6 MMOL/L — SIGNIFICANT CHANGE UP (ref 3.5–5.3)
POTASSIUM SERPL-SCNC: 4.6 MMOL/L — SIGNIFICANT CHANGE UP (ref 3.5–5.3)
PROT SERPL-MCNC: 7.1 G/DL — SIGNIFICANT CHANGE UP (ref 6–8.3)
RBC # BLD: 4.19 M/UL — SIGNIFICANT CHANGE UP (ref 3.8–5.2)
RBC # FLD: 14.1 % — SIGNIFICANT CHANGE UP (ref 10.3–14.5)
SODIUM SERPL-SCNC: 142 MMOL/L — SIGNIFICANT CHANGE UP (ref 135–145)
WBC # BLD: 13.65 K/UL — HIGH (ref 3.8–10.5)
WBC # FLD AUTO: 13.65 K/UL — HIGH (ref 3.8–10.5)

## 2021-06-07 PROCEDURE — 71045 X-RAY EXAM CHEST 1 VIEW: CPT | Mod: 26

## 2021-06-07 PROCEDURE — 99285 EMERGENCY DEPT VISIT HI MDM: CPT | Mod: GC

## 2021-06-07 PROCEDURE — 93010 ELECTROCARDIOGRAM REPORT: CPT | Mod: GC

## 2021-06-07 RX ORDER — HYDROCORTISONE 20 MG
100 TABLET ORAL ONCE
Refills: 0 | Status: COMPLETED | OUTPATIENT
Start: 2021-06-07 | End: 2021-06-07

## 2021-06-07 RX ORDER — DIPHENHYDRAMINE HCL 50 MG
50 CAPSULE ORAL ONCE
Refills: 0 | Status: COMPLETED | OUTPATIENT
Start: 2021-06-07 | End: 2021-06-07

## 2021-06-07 RX ORDER — ONDANSETRON 8 MG/1
4 TABLET, FILM COATED ORAL ONCE
Refills: 0 | Status: DISCONTINUED | OUTPATIENT
Start: 2021-06-07 | End: 2021-06-08

## 2021-06-07 RX ORDER — DIPHENHYDRAMINE HCL 50 MG
50 CAPSULE ORAL ONCE
Refills: 0 | Status: DISCONTINUED | OUTPATIENT
Start: 2021-06-07 | End: 2021-06-07

## 2021-06-07 RX ORDER — MORPHINE SULFATE 50 MG/1
2 CAPSULE, EXTENDED RELEASE ORAL ONCE
Refills: 0 | Status: DISCONTINUED | OUTPATIENT
Start: 2021-06-07 | End: 2021-06-07

## 2021-06-07 RX ORDER — SODIUM CHLORIDE 9 MG/ML
1000 INJECTION INTRAMUSCULAR; INTRAVENOUS; SUBCUTANEOUS ONCE
Refills: 0 | Status: COMPLETED | OUTPATIENT
Start: 2021-06-07 | End: 2021-06-07

## 2021-06-07 RX ADMIN — Medication 50 MILLIGRAM(S): at 23:26

## 2021-06-07 RX ADMIN — Medication 100 MILLIGRAM(S): at 20:20

## 2021-06-07 RX ADMIN — SODIUM CHLORIDE 1000 MILLILITER(S): 9 INJECTION INTRAMUSCULAR; INTRAVENOUS; SUBCUTANEOUS at 23:17

## 2021-06-07 NOTE — ED PROVIDER NOTE - PSH
History of cholecystectomy    Hx of bilateral breast reduction surgery    S/P cataract surgery  left eye  Traumatic amputation of finger

## 2021-06-07 NOTE — ED PROVIDER NOTE - NS ED ROS FT
GENERAL: No fever, no chills  EYES: no change in vision  HEENT: no trouble swallowing, no trouble speaking  CARDIAC: no chest pain  PULMONARY: no cough, no shortness of breath  GI: +abdominal pain, nausea, vomiting, diarrhea; no constipation  : No dysuria, no frequency, no change in appearance, or odor of urine  SKIN: no rashes  NEURO: no headache, no weakness  MSK: No joint pain

## 2021-06-07 NOTE — ED PROVIDER NOTE - PMH
Basal cell cancer    Depression    GERD (gastroesophageal reflux disease)    Hyperlipemia    Melanoma

## 2021-06-07 NOTE — ED PROVIDER NOTE - ATTENDING CONTRIBUTION TO CARE
PDM Riki Robledo PCP  89y female pmh DNR/DNI, HLD, Glaucoma, Macular Degeneration, Gerd, Depression, Pt comes to ed via ems from assisted living for c/o episode of NVD and abd pain after eatig lunch. Pain lower quads. PE WDWN eldelry female looking stated age heent ncat neck supple chset clear ap cv no rgm abd ttp luq/left mid quad. +bs neuro no focal defects  Jose Wolf MD, Facep

## 2021-06-07 NOTE — ED ADULT TRIAGE NOTE - CHIEF COMPLAINT QUOTE
by ems from [facility] assisted living with abd pain and nause/diarrhea x 1 4 hours ago; feels bloated

## 2021-06-07 NOTE — ED PROVIDER NOTE - CLINICAL SUMMARY MEDICAL DECISION MAKING FREE TEXT BOX
Older F w/ crampy abdominal pain a/w diarrhea and vomiting feels better now declining pain meds, tenderness LLQ. DDx diverticulitis, less likely ACS. will check abdominal labs, cardiac markers, EKG, CXR and CTAP. Possible dc if all wnl.

## 2021-06-07 NOTE — ED PROVIDER NOTE - PHYSICAL EXAMINATION
Gen: NAD, non-toxic appearing  Head: normal appearing  HEENT: normal conjunctiva, oral mucosa moist  Lung: no respiratory distress, speaking in full sentences, clear to ascultation bilaterally     CV: regular rate and rhythm   Abd: soft, non distended, suprapubic and LLQ tenderness  MSK: no visible deformities  Neuro: No focal deficits, AAOx3  Skin: Warm  Psych: normal affect

## 2021-06-07 NOTE — ED PROVIDER NOTE - OBJECTIVE STATEMENT
89y/o F w/ h/o HLD from [facility] for crampy lower abdominal pain that came on shortly after eating egg salad a/w diarrhea and 1 episode of vomiting. No fevers, chills, chest pain, sob, urinary symptoms.

## 2021-06-08 ENCOUNTER — TRANSCRIPTION ENCOUNTER (OUTPATIENT)
Age: 86
End: 2021-06-08

## 2021-06-08 VITALS
RESPIRATION RATE: 18 BRPM | HEART RATE: 75 BPM | OXYGEN SATURATION: 91 % | SYSTOLIC BLOOD PRESSURE: 159 MMHG | DIASTOLIC BLOOD PRESSURE: 72 MMHG

## 2021-06-08 DIAGNOSIS — F32.9 MAJOR DEPRESSIVE DISORDER, SINGLE EPISODE, UNSPECIFIED: ICD-10-CM

## 2021-06-08 DIAGNOSIS — Z71.89 OTHER SPECIFIED COUNSELING: ICD-10-CM

## 2021-06-08 DIAGNOSIS — K57.92 DIVERTICULITIS OF INTESTINE, PART UNSPECIFIED, WITHOUT PERFORATION OR ABSCESS WITHOUT BLEEDING: ICD-10-CM

## 2021-06-08 DIAGNOSIS — K21.9 GASTRO-ESOPHAGEAL REFLUX DISEASE WITHOUT ESOPHAGITIS: ICD-10-CM

## 2021-06-08 DIAGNOSIS — H40.9 UNSPECIFIED GLAUCOMA: ICD-10-CM

## 2021-06-08 DIAGNOSIS — F03.90 UNSPECIFIED DEMENTIA WITHOUT BEHAVIORAL DISTURBANCE: ICD-10-CM

## 2021-06-08 LAB
ALBUMIN SERPL ELPH-MCNC: 3.6 G/DL — SIGNIFICANT CHANGE UP (ref 3.3–5)
ALP SERPL-CCNC: 87 U/L — SIGNIFICANT CHANGE UP (ref 40–120)
ALT FLD-CCNC: 19 U/L — SIGNIFICANT CHANGE UP (ref 10–45)
ANION GAP SERPL CALC-SCNC: 11 MMOL/L — SIGNIFICANT CHANGE UP (ref 5–17)
APPEARANCE UR: CLEAR — SIGNIFICANT CHANGE UP
AST SERPL-CCNC: 23 U/L — SIGNIFICANT CHANGE UP (ref 10–40)
BACTERIA # UR AUTO: NEGATIVE — SIGNIFICANT CHANGE UP
BASOPHILS # BLD AUTO: 0.06 K/UL — SIGNIFICANT CHANGE UP (ref 0–0.2)
BASOPHILS NFR BLD AUTO: 0.6 % — SIGNIFICANT CHANGE UP (ref 0–2)
BILIRUB SERPL-MCNC: 0.4 MG/DL — SIGNIFICANT CHANGE UP (ref 0.2–1.2)
BILIRUB UR-MCNC: NEGATIVE — SIGNIFICANT CHANGE UP
BUN SERPL-MCNC: 23 MG/DL — SIGNIFICANT CHANGE UP (ref 7–23)
CALCIUM SERPL-MCNC: 9.9 MG/DL — SIGNIFICANT CHANGE UP (ref 8.4–10.5)
CHLORIDE SERPL-SCNC: 110 MMOL/L — HIGH (ref 96–108)
CO2 SERPL-SCNC: 22 MMOL/L — SIGNIFICANT CHANGE UP (ref 22–31)
COLOR SPEC: YELLOW — SIGNIFICANT CHANGE UP
CREAT SERPL-MCNC: 0.98 MG/DL — SIGNIFICANT CHANGE UP (ref 0.5–1.3)
DIFF PNL FLD: NEGATIVE — SIGNIFICANT CHANGE UP
EOSINOPHIL # BLD AUTO: 0.02 K/UL — SIGNIFICANT CHANGE UP (ref 0–0.5)
EOSINOPHIL NFR BLD AUTO: 0.2 % — SIGNIFICANT CHANGE UP (ref 0–6)
EPI CELLS # UR: 1 /HPF — SIGNIFICANT CHANGE UP
GLUCOSE SERPL-MCNC: 108 MG/DL — HIGH (ref 70–99)
GLUCOSE UR QL: NEGATIVE — SIGNIFICANT CHANGE UP
HCT VFR BLD CALC: 34.9 % — SIGNIFICANT CHANGE UP (ref 34.5–45)
HGB BLD-MCNC: 11.4 G/DL — LOW (ref 11.5–15.5)
HYALINE CASTS # UR AUTO: 1 /LPF — SIGNIFICANT CHANGE UP (ref 0–2)
IMM GRANULOCYTES NFR BLD AUTO: 0.7 % — SIGNIFICANT CHANGE UP (ref 0–1.5)
KETONES UR-MCNC: ABNORMAL
LEUKOCYTE ESTERASE UR-ACNC: NEGATIVE — SIGNIFICANT CHANGE UP
LYMPHOCYTES # BLD AUTO: 2.37 K/UL — SIGNIFICANT CHANGE UP (ref 1–3.3)
LYMPHOCYTES # BLD AUTO: 24.6 % — SIGNIFICANT CHANGE UP (ref 13–44)
MCHC RBC-ENTMCNC: 30.8 PG — SIGNIFICANT CHANGE UP (ref 27–34)
MCHC RBC-ENTMCNC: 32.7 GM/DL — SIGNIFICANT CHANGE UP (ref 32–36)
MCV RBC AUTO: 94.3 FL — SIGNIFICANT CHANGE UP (ref 80–100)
MONOCYTES # BLD AUTO: 0.82 K/UL — SIGNIFICANT CHANGE UP (ref 0–0.9)
MONOCYTES NFR BLD AUTO: 8.5 % — SIGNIFICANT CHANGE UP (ref 2–14)
NEUTROPHILS # BLD AUTO: 6.3 K/UL — SIGNIFICANT CHANGE UP (ref 1.8–7.4)
NEUTROPHILS NFR BLD AUTO: 65.4 % — SIGNIFICANT CHANGE UP (ref 43–77)
NITRITE UR-MCNC: NEGATIVE — SIGNIFICANT CHANGE UP
NRBC # BLD: 0 /100 WBCS — SIGNIFICANT CHANGE UP (ref 0–0)
PH UR: 6 — SIGNIFICANT CHANGE UP (ref 5–8)
PLATELET # BLD AUTO: 163 K/UL — SIGNIFICANT CHANGE UP (ref 150–400)
POTASSIUM SERPL-MCNC: 4.4 MMOL/L — SIGNIFICANT CHANGE UP (ref 3.5–5.3)
POTASSIUM SERPL-SCNC: 4.4 MMOL/L — SIGNIFICANT CHANGE UP (ref 3.5–5.3)
PROT SERPL-MCNC: 6.2 G/DL — SIGNIFICANT CHANGE UP (ref 6–8.3)
PROT UR-MCNC: ABNORMAL
RBC # BLD: 3.7 M/UL — LOW (ref 3.8–5.2)
RBC # FLD: 14 % — SIGNIFICANT CHANGE UP (ref 10.3–14.5)
RBC CASTS # UR COMP ASSIST: 1 /HPF — SIGNIFICANT CHANGE UP (ref 0–4)
SARS-COV-2 RNA SPEC QL NAA+PROBE: SIGNIFICANT CHANGE UP
SODIUM SERPL-SCNC: 143 MMOL/L — SIGNIFICANT CHANGE UP (ref 135–145)
SP GR SPEC: >1.05 (ref 1.01–1.02)
UROBILINOGEN FLD QL: NEGATIVE — SIGNIFICANT CHANGE UP
WBC # BLD: 9.64 K/UL — SIGNIFICANT CHANGE UP (ref 3.8–10.5)
WBC # FLD AUTO: 9.64 K/UL — SIGNIFICANT CHANGE UP (ref 3.8–10.5)
WBC UR QL: 5 /HPF — SIGNIFICANT CHANGE UP (ref 0–5)

## 2021-06-08 PROCEDURE — 71250 CT THORAX DX C-: CPT | Mod: 26,MA

## 2021-06-08 PROCEDURE — U0005: CPT

## 2021-06-08 PROCEDURE — 71250 CT THORAX DX C-: CPT

## 2021-06-08 PROCEDURE — 12345: CPT | Mod: NC

## 2021-06-08 PROCEDURE — 71045 X-RAY EXAM CHEST 1 VIEW: CPT

## 2021-06-08 PROCEDURE — 74177 CT ABD & PELVIS W/CONTRAST: CPT

## 2021-06-08 PROCEDURE — 83605 ASSAY OF LACTIC ACID: CPT

## 2021-06-08 PROCEDURE — 81001 URINALYSIS AUTO W/SCOPE: CPT

## 2021-06-08 PROCEDURE — 85025 COMPLETE CBC W/AUTO DIFF WBC: CPT

## 2021-06-08 PROCEDURE — 99285 EMERGENCY DEPT VISIT HI MDM: CPT | Mod: 25

## 2021-06-08 PROCEDURE — 87077 CULTURE AEROBIC IDENTIFY: CPT

## 2021-06-08 PROCEDURE — 87186 SC STD MICRODIL/AGAR DIL: CPT

## 2021-06-08 PROCEDURE — 80053 COMPREHEN METABOLIC PANEL: CPT

## 2021-06-08 PROCEDURE — U0003: CPT

## 2021-06-08 PROCEDURE — 84484 ASSAY OF TROPONIN QUANT: CPT

## 2021-06-08 PROCEDURE — 97162 PT EVAL MOD COMPLEX 30 MIN: CPT

## 2021-06-08 PROCEDURE — 74177 CT ABD & PELVIS W/CONTRAST: CPT | Mod: 26,ME

## 2021-06-08 PROCEDURE — G1004: CPT

## 2021-06-08 PROCEDURE — 87086 URINE CULTURE/COLONY COUNT: CPT

## 2021-06-08 PROCEDURE — 99223 1ST HOSP IP/OBS HIGH 75: CPT

## 2021-06-08 PROCEDURE — 83690 ASSAY OF LIPASE: CPT

## 2021-06-08 RX ORDER — ACETAMINOPHEN 500 MG
650 TABLET ORAL EVERY 4 HOURS
Refills: 0 | Status: DISCONTINUED | OUTPATIENT
Start: 2021-06-08 | End: 2021-06-08

## 2021-06-08 RX ORDER — HEPARIN SODIUM 5000 [USP'U]/ML
5000 INJECTION INTRAVENOUS; SUBCUTANEOUS
Refills: 0 | Status: DISCONTINUED | OUTPATIENT
Start: 2021-06-08 | End: 2021-06-08

## 2021-06-08 RX ORDER — CHOLECALCIFEROL (VITAMIN D3) 125 MCG
0 CAPSULE ORAL
Qty: 0 | Refills: 0 | DISCHARGE

## 2021-06-08 RX ORDER — OMEPRAZOLE 10 MG/1
1 CAPSULE, DELAYED RELEASE ORAL
Qty: 0 | Refills: 0 | DISCHARGE

## 2021-06-08 RX ORDER — LATANOPROST 0.05 MG/ML
1 SOLUTION/ DROPS OPHTHALMIC; TOPICAL AT BEDTIME
Refills: 0 | Status: DISCONTINUED | OUTPATIENT
Start: 2021-06-08 | End: 2021-06-08

## 2021-06-08 RX ORDER — LANOLIN ALCOHOL/MO/W.PET/CERES
1 CREAM (GRAM) TOPICAL
Qty: 0 | Refills: 0 | DISCHARGE

## 2021-06-08 RX ORDER — SENNA PLUS 8.6 MG/1
2 TABLET ORAL AT BEDTIME
Refills: 0 | Status: DISCONTINUED | OUTPATIENT
Start: 2021-06-08 | End: 2021-06-08

## 2021-06-08 RX ORDER — METRONIDAZOLE 500 MG
1 TABLET ORAL
Qty: 18 | Refills: 0
Start: 2021-06-08 | End: 2021-06-13

## 2021-06-08 RX ORDER — DONEPEZIL HYDROCHLORIDE 10 MG/1
1 TABLET, FILM COATED ORAL
Qty: 0 | Refills: 0 | DISCHARGE

## 2021-06-08 RX ORDER — CIPROFLOXACIN LACTATE 400MG/40ML
250 VIAL (ML) INTRAVENOUS
Refills: 0 | Status: DISCONTINUED | OUTPATIENT
Start: 2021-06-08 | End: 2021-06-08

## 2021-06-08 RX ORDER — ATORVASTATIN CALCIUM 80 MG/1
1 TABLET, FILM COATED ORAL
Qty: 0 | Refills: 0 | DISCHARGE

## 2021-06-08 RX ORDER — LATANOPROST 0.05 MG/ML
1 SOLUTION/ DROPS OPHTHALMIC; TOPICAL
Qty: 0 | Refills: 0 | DISCHARGE

## 2021-06-08 RX ORDER — POLYETHYLENE GLYCOL 3350 17 G/17G
0 POWDER, FOR SOLUTION ORAL
Qty: 0 | Refills: 0 | DISCHARGE

## 2021-06-08 RX ORDER — METRONIDAZOLE 500 MG
1 TABLET ORAL
Qty: 18 | Refills: 0
Start: 2021-06-08 | End: 2021-06-15

## 2021-06-08 RX ORDER — HYDROCORTISONE ACETATE 1 %
0 OINTMENT (GRAM) RECTAL
Qty: 0 | Refills: 0 | DISCHARGE

## 2021-06-08 RX ORDER — CIPROFLOXACIN LACTATE 400MG/40ML
400 VIAL (ML) INTRAVENOUS ONCE
Refills: 0 | Status: COMPLETED | OUTPATIENT
Start: 2021-06-08 | End: 2021-06-08

## 2021-06-08 RX ORDER — DONEPEZIL HYDROCHLORIDE 10 MG/1
5 TABLET, FILM COATED ORAL AT BEDTIME
Refills: 0 | Status: DISCONTINUED | OUTPATIENT
Start: 2021-06-08 | End: 2021-06-08

## 2021-06-08 RX ORDER — MIRTAZAPINE 45 MG/1
1 TABLET, ORALLY DISINTEGRATING ORAL
Qty: 0 | Refills: 0 | DISCHARGE

## 2021-06-08 RX ORDER — METRONIDAZOLE 500 MG
500 TABLET ORAL EVERY 8 HOURS
Refills: 0 | Status: DISCONTINUED | OUTPATIENT
Start: 2021-06-08 | End: 2021-06-08

## 2021-06-08 RX ORDER — SENNA PLUS 8.6 MG/1
1 TABLET ORAL
Qty: 0 | Refills: 0 | DISCHARGE

## 2021-06-08 RX ORDER — MEMANTINE HYDROCHLORIDE 10 MG/1
1 TABLET ORAL
Qty: 0 | Refills: 0 | DISCHARGE

## 2021-06-08 RX ORDER — LANOLIN ALCOHOL/MO/W.PET/CERES
10 CREAM (GRAM) TOPICAL AT BEDTIME
Refills: 0 | Status: DISCONTINUED | OUTPATIENT
Start: 2021-06-08 | End: 2021-06-08

## 2021-06-08 RX ORDER — MIRTAZAPINE 45 MG/1
45 TABLET, ORALLY DISINTEGRATING ORAL AT BEDTIME
Refills: 0 | Status: DISCONTINUED | OUTPATIENT
Start: 2021-06-08 | End: 2021-06-08

## 2021-06-08 RX ORDER — CIPROFLOXACIN LACTATE 400MG/40ML
200 VIAL (ML) INTRAVENOUS EVERY 12 HOURS
Refills: 0 | Status: DISCONTINUED | OUTPATIENT
Start: 2021-06-08 | End: 2021-06-08

## 2021-06-08 RX ORDER — CIPROFLOXACIN LACTATE 400MG/40ML
1 VIAL (ML) INTRAVENOUS
Qty: 12 | Refills: 0
Start: 2021-06-08 | End: 2021-06-15

## 2021-06-08 RX ORDER — PANTOPRAZOLE SODIUM 20 MG/1
40 TABLET, DELAYED RELEASE ORAL
Refills: 0 | Status: DISCONTINUED | OUTPATIENT
Start: 2021-06-08 | End: 2021-06-08

## 2021-06-08 RX ORDER — CHOLECALCIFEROL (VITAMIN D3) 125 MCG
1000 CAPSULE ORAL DAILY
Refills: 0 | Status: DISCONTINUED | OUTPATIENT
Start: 2021-06-08 | End: 2021-06-08

## 2021-06-08 RX ORDER — MEMANTINE HYDROCHLORIDE 10 MG/1
10 TABLET ORAL
Refills: 0 | Status: DISCONTINUED | OUTPATIENT
Start: 2021-06-08 | End: 2021-06-08

## 2021-06-08 RX ORDER — LANOLIN ALCOHOL/MO/W.PET/CERES
3 CREAM (GRAM) TOPICAL AT BEDTIME
Refills: 0 | Status: DISCONTINUED | OUTPATIENT
Start: 2021-06-08 | End: 2021-06-08

## 2021-06-08 RX ORDER — METRONIDAZOLE 500 MG
500 TABLET ORAL ONCE
Refills: 0 | Status: COMPLETED | OUTPATIENT
Start: 2021-06-08 | End: 2021-06-08

## 2021-06-08 RX ORDER — CIPROFLOXACIN LACTATE 400MG/40ML
1 VIAL (ML) INTRAVENOUS
Qty: 12 | Refills: 0
Start: 2021-06-08 | End: 2021-06-13

## 2021-06-08 RX ORDER — ATORVASTATIN CALCIUM 80 MG/1
10 TABLET, FILM COATED ORAL AT BEDTIME
Refills: 0 | Status: DISCONTINUED | OUTPATIENT
Start: 2021-06-08 | End: 2021-06-08

## 2021-06-08 RX ADMIN — HEPARIN SODIUM 5000 UNIT(S): 5000 INJECTION INTRAVENOUS; SUBCUTANEOUS at 06:24

## 2021-06-08 RX ADMIN — MEMANTINE HYDROCHLORIDE 10 MILLIGRAM(S): 10 TABLET ORAL at 18:39

## 2021-06-08 RX ADMIN — Medication 500 MILLIGRAM(S): at 13:17

## 2021-06-08 RX ADMIN — Medication 100 MILLIGRAM(S): at 00:46

## 2021-06-08 RX ADMIN — Medication 1000 UNIT(S): at 13:17

## 2021-06-08 RX ADMIN — Medication 1 TABLET(S): at 13:17

## 2021-06-08 RX ADMIN — Medication 100 MILLIGRAM(S): at 08:17

## 2021-06-08 RX ADMIN — Medication 500 MILLIGRAM(S): at 21:13

## 2021-06-08 RX ADMIN — PANTOPRAZOLE SODIUM 40 MILLIGRAM(S): 20 TABLET, DELAYED RELEASE ORAL at 06:24

## 2021-06-08 RX ADMIN — Medication 200 MILLIGRAM(S): at 02:44

## 2021-06-08 RX ADMIN — Medication 10 MILLIGRAM(S): at 02:45

## 2021-06-08 RX ADMIN — Medication 250 MILLIGRAM(S): at 18:39

## 2021-06-08 RX ADMIN — DONEPEZIL HYDROCHLORIDE 5 MILLIGRAM(S): 10 TABLET, FILM COATED ORAL at 21:13

## 2021-06-08 RX ADMIN — MIRTAZAPINE 45 MILLIGRAM(S): 45 TABLET, ORALLY DISINTEGRATING ORAL at 21:13

## 2021-06-08 RX ADMIN — LATANOPROST 1 DROP(S): 0.05 SOLUTION/ DROPS OPHTHALMIC; TOPICAL at 21:12

## 2021-06-08 RX ADMIN — ATORVASTATIN CALCIUM 10 MILLIGRAM(S): 80 TABLET, FILM COATED ORAL at 21:13

## 2021-06-08 NOTE — H&P ADULT - NSHPSOCIALHISTORY_GEN_ALL_CORE
Social History:    Marital Status:  (   )    (   ) Single    (   )    ( x )   Occupation: retired   Lives with: at [facility]     Substance Use (street drugs): ( x ) never used  (  ) other:  Tobacco Usage:  (   ) never smoked   ( x  ) former smoker   (   ) current smoker  (     ) pack year  (        ) last cigarette date  Alcohol Usage: no etoh use

## 2021-06-08 NOTE — DISCHARGE NOTE PROVIDER - NSDCMRMEDTOKEN_GEN_ALL_CORE_FT
Anusol 1%-12.5% topical ointment (obsolete):   atorvastatin 10 mg oral tablet: 1 tab(s) orally once a day  biotin:   donepezil 5 mg oral tablet: 1 tab(s) orally once a day (at bedtime)  latanoprost 0.005% ophthalmic solution: 1 drop(s) to each affected eye once a day (in the evening)  Melatonin 3 mg oral tablet: 1 tab(s) orally once a day (at bedtime)  memantine 28 mg oral capsule, extended release: 1 cap(s) orally once a day  MiraLax oral powder for reconstitution:   mirtazapine 45 mg oral tablet, disintegratin tab(s) orally once a day (at bedtime)  omeprazole 20 mg oral delayed release capsule: 1 cap(s) orally once a day  Senna 8.6 mg oral tablet: 1 tab(s) orally once a day (at bedtime)  Vitamin D3:    Anusol 1%-12.5% topical ointment (obsolete):   atorvastatin 10 mg oral tablet: 1 tab(s) orally once a day  biotin:   ciprofloxacin 250 mg oral tablet: 1 tab(s) orally 2 times a day  donepezil 5 mg oral tablet: 1 tab(s) orally once a day (at bedtime)  latanoprost 0.005% ophthalmic solution: 1 drop(s) to each affected eye once a day (in the evening)  Melatonin 3 mg oral tablet: 1 tab(s) orally once a day (at bedtime)  memantine 28 mg oral capsule, extended release: 1 cap(s) orally once a day  metroNIDAZOLE 500 mg oral tablet: 1 tab(s) orally every 8 hours  MiraLax oral powder for reconstitution:   mirtazapine 45 mg oral tablet, disintegratin tab(s) orally once a day (at bedtime)  omeprazole 20 mg oral delayed release capsule: 1 cap(s) orally once a day  Senna 8.6 mg oral tablet: 1 tab(s) orally once a day (at bedtime)  Vitamin D3:

## 2021-06-08 NOTE — PHYSICAL THERAPY INITIAL EVALUATION ADULT - PERTINENT HX OF CURRENT PROBLEM, REHAB EVAL
89y/o F w/ h/o HLD, glaucoma , macular degeneration, mild cognitive impairment,  from [facility] presents for abdominal pain for the past day. CT Head (-), CT ABD 6/8, acute mild uncomplicated diverticulitis. CXR 6/8, Rt upper lung reticular opacities.

## 2021-06-08 NOTE — H&P ADULT - ASSESSMENT
89 F w/ h/o HLD, glaucoma , macular degeneration, mild cognitive impairment,  from [facility] presents for abdominal pain for the past day.

## 2021-06-08 NOTE — H&P ADULT - NSICDXPASTSURGICALHX_GEN_ALL_CORE_FT
PAST SURGICAL HISTORY:  History of cholecystectomy     Hx of bilateral breast reduction surgery     S/P cataract surgery left eye    Traumatic amputation of finger

## 2021-06-08 NOTE — DISCHARGE NOTE PROVIDER - CARE PROVIDER_API CALL
Lena Haas)  Medicine  Hospitalists  [address] [place] [address]  Phone: [phone]  Fax: [phone]  Follow Up Time:

## 2021-06-08 NOTE — DISCHARGE NOTE PROVIDER - HOSPITAL COURSE
89 F w/ h/o HLD, glaucoma , macular degeneration, mild cognitive impairment,  from [facility] presents for abdominal pain for the past day. CT abdomen: Pancolonic diverticulosis with mild infiltration of the pericolonic fat adjacent to the mid sigmoid colon likely representing acute mild uncomplicated diverticulitis.  Cipro/ Flagyl initiated.  Ms. Mcnulty's condition improved remarkably- Patient tolerated regular diet without incident - seen and evaluated by PT- recommends home with outpt pt.

## 2021-06-08 NOTE — PHYSICAL THERAPY INITIAL EVALUATION ADULT - ADDITIONAL COMMENTS
as per pt, resides in an halfway prior to coming to hospital, pt (I) with amb, required some assist for ADls.

## 2021-06-08 NOTE — ED ADULT NURSE NOTE - CHIEF COMPLAINT QUOTE
by ems from [facility] assisted living with abd pain and nause/diarrhea x 1 4 hours ago; feels bloated

## 2021-06-08 NOTE — H&P ADULT - NSHPLABSRESULTS_GEN_ALL_CORE
Labs personally reviewed:                          12.5   13.65 )-----------( 191      ( 07 Jun 2021 20:34 )             39.2     06-07    142  |  108  |  27<H>  ----------------------------<  102<H>  4.6   |  23  |  1.14    Ca    10.8<H>      07 Jun 2021 20:34    TPro  7.1  /  Alb  4.2  /  TBili  0.3  /  DBili  x   /  AST  27  /  ALT  23  /  AlkPhos  103  06-07        LIVER FUNCTIONS - ( 07 Jun 2021 20:34 )  Alb: 4.2 g/dL / Pro: 7.1 g/dL / ALK PHOS: 103 U/L / ALT: 23 U/L / AST: 27 U/L / GGT: x               CAPILLARY BLOOD GLUCOSE          Imaging:  CT chest: No acute parenchymal or pleural lung disease. Peripheral reticular markings throughout both lungs likely reflecting mild fibrotic changes.    CT abdomen: Pancolonic diverticulosis with mild infiltration of the pericolonic fat adjacent to the mid sigmoid colon likely representing acute mild uncomplicated diverticulitis.    Mild intrahepatic and extrahepatic biliary ductal dilatation likely within normal limits in postcholecystectomy state.      EKG personally reviewed: nsr at 79 bpm ,  no acute st changes

## 2021-06-08 NOTE — H&P ADULT - HISTORY OF PRESENT ILLNESS
89y/o F w/ h/o HLD, glaucoma , macular degeneration, mild cognitive impairment,  from [facility] presents for abdominal pain for the past day. Patient reports symptoms started shortly after eating egg salad , she reports nausea and non bloody vomiting , associated with crampy lower abdominal pain followed by explosive non bloody diarrhea. She reports no fever or chills , pain is diffuse on the lower abdomen , non radiating. Patient reports similar episodes in the past but unable to determine the cause.

## 2021-06-08 NOTE — H&P ADULT - NSHPREVIEWOFSYSTEMS_GEN_ALL_CORE
CONSTITUTIONAL: No weakness, fevers or chills  EYES/ENT: No visual changes;  No dysphagia  NECK: No pain or stiffness  RESPIRATORY: No cough, wheezing, hemoptysis; No shortness of breath  CARDIOVASCULAR: No chest pain or palpitations; No lower extremity edema  EXTREMITIES: no le edema, cyanosis, clubbing  GASTROINTESTINAL: + abdominal pain. + nausea, + vomiting, or hematemesis; + diarrhea no constipation. No melena or hematochezia.  BACK: No back pain  GENITOURINARY: No dysuria, frequency or hematuria  NEUROLOGICAL: No numbness or weakness  MSK: no joint swelling or pain  SKIN: No itching, burning, rashes, or lesions   PSYCH: no agitation  All other review of systems is negative unless indicated above.

## 2021-06-08 NOTE — DISCHARGE NOTE PROVIDER - NSDCCPCAREPLAN_GEN_ALL_CORE_FT
PRINCIPAL DISCHARGE DIAGNOSIS  Diagnosis: Diverticulitis of intestine without bleeding  Assessment and Plan of Treatment: Improved significantly  Complete 7 days Cipro/Flagyl      SECONDARY DISCHARGE DIAGNOSES  Diagnosis: GERD (gastroesophageal reflux disease)  Assessment and Plan of Treatment: GERD (gastroesophageal reflux disease)- Stable . Avoid spicy food  Continue current out patient management    Diagnosis: Depression  Assessment and Plan of Treatment: Stable- Continue current outpatient  management

## 2021-06-08 NOTE — H&P ADULT - NSHPPHYSICALEXAM_GEN_ALL_CORE
Vital Signs Last 24 Hrs  T(C): 36.6 (08 Jun 2021 05:20), Max: 37.1 (08 Jun 2021 04:13)  T(F): 97.9 (08 Jun 2021 05:20), Max: 98.8 (08 Jun 2021 04:13)  HR: 71 (08 Jun 2021 05:20) (71 - 86)  BP: 136/50 (08 Jun 2021 05:20) (136/50 - 144/55)  BP(mean): 77 (08 Jun 2021 04:13) (77 - 79)  RR: 20 (08 Jun 2021 05:20) (14 - 20)  SpO2: 92% (08 Jun 2021 05:20) (92% - 94%)    GENERAL: No acute distress, well-developed  HEAD:  Atraumatic, Normocephalic  ENT: EOMI, PERRLA, conjunctiva and sclera clear,  moist mucosa no pharyngeal erythema or exudates   NECK: supple , no JVD   CHEST/LUNG: Clear to auscultation bilaterally; No wheeze, equal breath sounds bilaterally   BACK: No spinal tenderness,  No CVA tenderness   HEART: Regular rate and rhythm; No murmurs, rubs, or gallops  ABDOMEN: Soft, Nontender, Nondistended; Bowel sounds present  EXTREMITIES:  No clubbing, cyanosis, or edema  MSK: No joint swelling or effusions, ROM intact   PSYCH: Normal behavior/affect  NEUROLOGY: AAOx3, non-focal, cranial nerves intact  SKIN: Normal color, No rashes or lesions

## 2021-06-08 NOTE — ED ADULT NURSE NOTE - OBJECTIVE STATEMENT
89y/o F w/ h/o HLD from [facility] for crampy lower abdominal pain that came on shortly after eating egg salad a/w diarrhea and 1 episode of vomiting. No fevers, chills, chest pain, sob, urinary symptoms.

## 2021-06-08 NOTE — H&P ADULT - NSICDXPASTMEDICALHX_GEN_ALL_CORE_FT
PAST MEDICAL HISTORY:  Basal cell cancer     Depression     GERD (gastroesophageal reflux disease)     Hyperlipemia     Melanoma

## 2021-06-08 NOTE — CHART NOTE - NSCHARTNOTEFT_GEN_A_CORE
89 F w/ h/o HLD, glaucoma , macular degeneration, mild cognitive impairment,  from [facility] presents for abdominal pain for the past day found to have Pancolonic diverticulosis with mild infiltration of the pericolonic fat adjacent to the mid sigmoid colon likely representing acute mild uncomplicated diverticulitis. Pt was started on IV cipro/flagyl with improvement in abdominal pain/tenderness overnight.     Overnight, no acute events. Vitals stable. afebrile  Leukocytosis now resolved  Pt tolerating po well this morning    - switch to po cipro/flagyl for total 7 day course  - advance diet as tolerated  - PT consult (from [facility])    Pt stable for discharge once able to tolerate solid food for lunch  Care discussed with NP Teetee Haas MD  Division of Hospital Medicine  Cell: [phone]  Office: [phone] 89 F w/ h/o HLD, glaucoma , macular degeneration, mild cognitive impairment,  from [facility] presents for abdominal pain for the past day found to have Pancolonic diverticulosis with mild infiltration of the pericolonic fat adjacent to the mid sigmoid colon likely representing acute mild uncomplicated diverticulitis. Pt was started on IV cipro/flagyl with improvement in abdominal pain/tenderness overnight.     Overnight, no acute events. Vitals stable. afebrile  Leukocytosis now resolved  Pt tolerating po well this morning    - switch to po cipro/flagyl for total 7 day course  - advance diet as tolerated  - PT consult (from [facility])      ** Based on my assessment, this patient’s condition has improved and no longer warrants inpatient status and will be changed to outpatient status prior to being discharged from the hospital.  This decision is based on the following reasons:   -tolerating po well  -hemodynamically stable, resolution of wbc  -to complete po abx at home    pt stable for discharge  care discussed with YAN Haas MD  Division of Hospital Medicine  Cell: [phone]  Office: [phone]

## 2021-06-08 NOTE — DISCHARGE NOTE NURSING/CASE MANAGEMENT/SOCIAL WORK - PATIENT PORTAL LINK FT
You can access the FollowMyHealth Patient Portal offered by [facility] by registering at the following website: [email or web address]. By joining [employer]’s FollowMyHealth portal, you will also be able to view your health information using other applications (apps) compatible with our system.

## 2021-06-09 ENCOUNTER — NON-APPOINTMENT (OUTPATIENT)
Age: 86
End: 2021-06-09

## 2021-06-10 ENCOUNTER — TRANSCRIPTION ENCOUNTER (OUTPATIENT)
Age: 86
End: 2021-06-10

## 2021-06-11 ENCOUNTER — APPOINTMENT (OUTPATIENT)
Dept: INTERNAL MEDICINE | Facility: CLINIC | Age: 86
End: 2021-06-11
Payer: MEDICARE

## 2021-06-11 VITALS
HEIGHT: 58 IN | TEMPERATURE: 98.2 F | HEART RATE: 87 BPM | SYSTOLIC BLOOD PRESSURE: 150 MMHG | DIASTOLIC BLOOD PRESSURE: 75 MMHG | BODY MASS INDEX: 29.39 KG/M2 | WEIGHT: 140 LBS | OXYGEN SATURATION: 88 %

## 2021-06-11 PROCEDURE — 99495 TRANSJ CARE MGMT MOD F2F 14D: CPT

## 2021-06-11 NOTE — PHYSICAL EXAM
[Normal] : normal rate, regular rhythm, normal S1 and S2 and no murmur heard [Soft] : abdomen soft [No HSM] : no HSM [de-identified] : mild tenderness  bowel sound present but diminished [37698 - Moderate Complexity requires multiple possible diagnoses and/or the management options, moderate complexity of the medical data (tests, etc.) to be reviewed, and moderate risk of significant complications, morbidity, and/or mortality as well as co] : Moderate Complexity

## 2021-06-11 NOTE — HISTORY OF PRESENT ILLNESS
[Post-hospitalization from ___ Hospital] : Post-hospitalization from [unfilled] Hospital [Admitted on: ___] : The patient was admitted on [unfilled] [FreeTextEntry2] : 1 day hospital for mild diverticulitis\par sent home on cipro 250 bi d and flagyl tid\par eating regular diet with salads\par pain no better or increasing

## 2021-06-11 NOTE — PLAN
[FreeTextEntry1] : Diverticultis, exacerbated by inappropiate diet\par \par liguid diet/suppement and eggs 3 days\par then soft diet 2 day\par then resume diet\par cipro 500 bid and flaguyl 250 tid

## 2021-06-14 ENCOUNTER — TRANSCRIPTION ENCOUNTER (OUTPATIENT)
Age: 86
End: 2021-06-14

## 2021-06-15 ENCOUNTER — APPOINTMENT (OUTPATIENT)
Dept: VASCULAR SURGERY | Facility: CLINIC | Age: 86
End: 2021-06-15
Payer: MEDICARE

## 2021-06-15 ENCOUNTER — APPOINTMENT (OUTPATIENT)
Dept: INTERNAL MEDICINE | Facility: CLINIC | Age: 86
End: 2021-06-15
Payer: MEDICARE

## 2021-06-15 ENCOUNTER — TRANSCRIPTION ENCOUNTER (OUTPATIENT)
Age: 86
End: 2021-06-15

## 2021-06-15 VITALS
BODY MASS INDEX: 29.81 KG/M2 | WEIGHT: 142 LBS | DIASTOLIC BLOOD PRESSURE: 81 MMHG | HEIGHT: 58 IN | OXYGEN SATURATION: 96 % | SYSTOLIC BLOOD PRESSURE: 144 MMHG | HEART RATE: 86 BPM | TEMPERATURE: 98 F

## 2021-06-15 DIAGNOSIS — K57.92 DIVERTICULITIS OF INTESTINE, PART UNSPECIFIED, W/OUT PERFORATION OR ABSCESS W/OUT BLEEDING: ICD-10-CM

## 2021-06-15 DIAGNOSIS — M79.605 PAIN IN RIGHT LEG: ICD-10-CM

## 2021-06-15 DIAGNOSIS — M79.604 PAIN IN RIGHT LEG: ICD-10-CM

## 2021-06-15 LAB
ALBUMIN SERPL ELPH-MCNC: 4.3 G/DL
ALP BLD-CCNC: 101 U/L
ALT SERPL-CCNC: 91 U/L
ANION GAP SERPL CALC-SCNC: 13 MMOL/L
AST SERPL-CCNC: 77 U/L
BASOPHILS # BLD AUTO: 0.11 K/UL
BASOPHILS NFR BLD AUTO: 1.2 %
BILIRUB SERPL-MCNC: 0.4 MG/DL
BUN SERPL-MCNC: 18 MG/DL
CALCIUM SERPL-MCNC: 11.1 MG/DL
CHLORIDE SERPL-SCNC: 103 MMOL/L
CO2 SERPL-SCNC: 23 MMOL/L
CREAT SERPL-MCNC: 1.19 MG/DL
EOSINOPHIL # BLD AUTO: 0.16 K/UL
EOSINOPHIL NFR BLD AUTO: 1.7 %
GLUCOSE SERPL-MCNC: 149 MG/DL
HCT VFR BLD CALC: 42.3 %
HGB BLD-MCNC: 13.4 G/DL
IMM GRANULOCYTES NFR BLD AUTO: 1.2 %
LYMPHOCYTES # BLD AUTO: 1.84 K/UL
LYMPHOCYTES NFR BLD AUTO: 19.6 %
MAN DIFF?: NORMAL
MCHC RBC-ENTMCNC: 29.6 PG
MCHC RBC-ENTMCNC: 31.7 GM/DL
MCV RBC AUTO: 93.6 FL
MONOCYTES # BLD AUTO: 0.96 K/UL
MONOCYTES NFR BLD AUTO: 10.2 %
NEUTROPHILS # BLD AUTO: 6.21 K/UL
NEUTROPHILS NFR BLD AUTO: 66.1 %
PLATELET # BLD AUTO: 212 K/UL
POTASSIUM SERPL-SCNC: 4.4 MMOL/L
PROT SERPL-MCNC: 7 G/DL
RBC # BLD: 4.52 M/UL
RBC # FLD: 15.1 %
SODIUM SERPL-SCNC: 140 MMOL/L
WBC # FLD AUTO: 9.39 K/UL

## 2021-06-15 PROCEDURE — 36415 COLL VENOUS BLD VENIPUNCTURE: CPT

## 2021-06-15 PROCEDURE — 99214 OFFICE O/P EST MOD 30 MIN: CPT | Mod: 25

## 2021-06-15 PROCEDURE — 93970 EXTREMITY STUDY: CPT

## 2021-06-15 RX ORDER — ACETAMINOPHEN 500 MG/1
500 TABLET ORAL TWICE DAILY
Qty: 120 | Refills: 0 | Status: ACTIVE | COMMUNITY
Start: 2021-06-15 | End: 1900-01-01

## 2021-06-15 NOTE — PHYSICAL EXAM
[Normal] : normal rate, regular rhythm, normal S1 and S2 and no murmur heard [Soft] : abdomen soft [No HSM] : no HSM [de-identified] : no edema mild tenderness left calf [de-identified] : minimal left lower abd discomfort

## 2021-06-15 NOTE — HISTORY OF PRESENT ILLNESS
[FreeTextEntry1] : leg swelling [de-identified] : last night legs swollen left greater than right\par no sob or cp\par stomach mild pain but improved\par on soft diet

## 2021-06-15 NOTE — PLAN
[FreeTextEntry1] : r/o dvt\par diverticulitis improving\par check labs\par continue soft diet\par \par doppler neg\par tylenol 1000 mg bid\par reassurance\par no water pills necessary

## 2021-06-21 DIAGNOSIS — R19.7 DIARRHEA, UNSPECIFIED: ICD-10-CM

## 2021-06-23 ENCOUNTER — NON-APPOINTMENT (OUTPATIENT)
Age: 86
End: 2021-06-23

## 2021-06-23 ENCOUNTER — TRANSCRIPTION ENCOUNTER (OUTPATIENT)
Age: 86
End: 2021-06-23

## 2021-06-23 ENCOUNTER — APPOINTMENT (OUTPATIENT)
Dept: OPHTHALMOLOGY | Facility: CLINIC | Age: 86
End: 2021-06-23
Payer: MEDICARE

## 2021-06-23 PROCEDURE — 92133 CPTRZD OPH DX IMG PST SGM ON: CPT

## 2021-06-23 PROCEDURE — 99205 OFFICE O/P NEW HI 60 MIN: CPT

## 2021-06-23 PROCEDURE — 92025 CPTRIZED CORNEAL TOPOGRAPHY: CPT

## 2021-06-23 RX ORDER — METRONIDAZOLE 250 MG/1
250 TABLET ORAL
Qty: 21 | Refills: 2 | Status: COMPLETED | COMMUNITY
Start: 2021-06-11 | End: 2021-06-23

## 2021-06-23 RX ORDER — CIPROFLOXACIN HYDROCHLORIDE 500 MG/1
500 TABLET, FILM COATED ORAL
Qty: 14 | Refills: 2 | Status: COMPLETED | COMMUNITY
Start: 2021-06-11 | End: 2021-06-23

## 2022-01-11 ENCOUNTER — EMERGENCY (EMERGENCY)
Facility: HOSPITAL | Age: 87
LOS: 1 days | Discharge: HOME | End: 2022-01-11
Attending: EMERGENCY MEDICINE
Payer: MEDICARE

## 2022-01-11 VITALS
OXYGEN SATURATION: 94 % | SYSTOLIC BLOOD PRESSURE: 176 MMHG | HEIGHT: 59 IN | RESPIRATION RATE: 18 BRPM | HEART RATE: 83 BPM | WEIGHT: 130.07 LBS | DIASTOLIC BLOOD PRESSURE: 67 MMHG | TEMPERATURE: 98 F

## 2022-01-11 VITALS
OXYGEN SATURATION: 95 % | HEART RATE: 76 BPM | RESPIRATION RATE: 20 BRPM | TEMPERATURE: 99 F | DIASTOLIC BLOOD PRESSURE: 74 MMHG | SYSTOLIC BLOOD PRESSURE: 169 MMHG

## 2022-01-11 LAB
ALBUMIN SERPL ELPH-MCNC: 4.1 G/DL — SIGNIFICANT CHANGE UP (ref 3.3–5)
ALP SERPL-CCNC: 106 U/L — SIGNIFICANT CHANGE UP (ref 40–120)
ALT FLD-CCNC: 10 U/L — SIGNIFICANT CHANGE UP (ref 10–45)
ANION GAP SERPL CALC-SCNC: 13 MMOL/L — SIGNIFICANT CHANGE UP (ref 5–17)
APPEARANCE UR: CLEAR — SIGNIFICANT CHANGE UP
AST SERPL-CCNC: 18 U/L — SIGNIFICANT CHANGE UP (ref 10–40)
BACTERIA # UR AUTO: NEGATIVE — SIGNIFICANT CHANGE UP
BASOPHILS # BLD AUTO: 0.09 K/UL — SIGNIFICANT CHANGE UP (ref 0–0.2)
BASOPHILS NFR BLD AUTO: 1 % — SIGNIFICANT CHANGE UP (ref 0–2)
BILIRUB SERPL-MCNC: 0.1 MG/DL — LOW (ref 0.2–1.2)
BILIRUB UR-MCNC: NEGATIVE — SIGNIFICANT CHANGE UP
BUN SERPL-MCNC: 23 MG/DL — SIGNIFICANT CHANGE UP (ref 7–23)
CALCIUM SERPL-MCNC: 10.7 MG/DL — HIGH (ref 8.4–10.5)
CHLORIDE SERPL-SCNC: 107 MMOL/L — SIGNIFICANT CHANGE UP (ref 96–108)
CO2 SERPL-SCNC: 23 MMOL/L — SIGNIFICANT CHANGE UP (ref 22–31)
COLOR SPEC: COLORLESS — SIGNIFICANT CHANGE UP
CREAT SERPL-MCNC: 1.06 MG/DL — SIGNIFICANT CHANGE UP (ref 0.5–1.3)
DIFF PNL FLD: NEGATIVE — SIGNIFICANT CHANGE UP
EOSINOPHIL # BLD AUTO: 0.28 K/UL — SIGNIFICANT CHANGE UP (ref 0–0.5)
EOSINOPHIL NFR BLD AUTO: 3 % — SIGNIFICANT CHANGE UP (ref 0–6)
EPI CELLS # UR: 0 /HPF — SIGNIFICANT CHANGE UP
GLUCOSE SERPL-MCNC: 124 MG/DL — HIGH (ref 70–99)
GLUCOSE UR QL: NEGATIVE — SIGNIFICANT CHANGE UP
HCT VFR BLD CALC: 38.5 % — SIGNIFICANT CHANGE UP (ref 34.5–45)
HGB BLD-MCNC: 12.5 G/DL — SIGNIFICANT CHANGE UP (ref 11.5–15.5)
HYALINE CASTS # UR AUTO: 0 /LPF — SIGNIFICANT CHANGE UP (ref 0–2)
IMM GRANULOCYTES NFR BLD AUTO: 0.7 % — SIGNIFICANT CHANGE UP (ref 0–1.5)
KETONES UR-MCNC: NEGATIVE — SIGNIFICANT CHANGE UP
LEUKOCYTE ESTERASE UR-ACNC: NEGATIVE — SIGNIFICANT CHANGE UP
LIDOCAIN IGE QN: 59 U/L — SIGNIFICANT CHANGE UP (ref 7–60)
LYMPHOCYTES # BLD AUTO: 2.15 K/UL — SIGNIFICANT CHANGE UP (ref 1–3.3)
LYMPHOCYTES # BLD AUTO: 22.9 % — SIGNIFICANT CHANGE UP (ref 13–44)
MCHC RBC-ENTMCNC: 30 PG — SIGNIFICANT CHANGE UP (ref 27–34)
MCHC RBC-ENTMCNC: 32.5 GM/DL — SIGNIFICANT CHANGE UP (ref 32–36)
MCV RBC AUTO: 92.3 FL — SIGNIFICANT CHANGE UP (ref 80–100)
MONOCYTES # BLD AUTO: 0.73 K/UL — SIGNIFICANT CHANGE UP (ref 0–0.9)
MONOCYTES NFR BLD AUTO: 7.8 % — SIGNIFICANT CHANGE UP (ref 2–14)
NEUTROPHILS # BLD AUTO: 6.06 K/UL — SIGNIFICANT CHANGE UP (ref 1.8–7.4)
NEUTROPHILS NFR BLD AUTO: 64.6 % — SIGNIFICANT CHANGE UP (ref 43–77)
NITRITE UR-MCNC: NEGATIVE — SIGNIFICANT CHANGE UP
NRBC # BLD: 0 /100 WBCS — SIGNIFICANT CHANGE UP (ref 0–0)
PH UR: 6.5 — SIGNIFICANT CHANGE UP (ref 5–8)
PLATELET # BLD AUTO: 188 K/UL — SIGNIFICANT CHANGE UP (ref 150–400)
POTASSIUM SERPL-MCNC: 4.1 MMOL/L — SIGNIFICANT CHANGE UP (ref 3.5–5.3)
POTASSIUM SERPL-SCNC: 4.1 MMOL/L — SIGNIFICANT CHANGE UP (ref 3.5–5.3)
PROCALCITONIN SERPL-MCNC: 0.06 NG/ML — SIGNIFICANT CHANGE UP (ref 0.02–0.1)
PROT SERPL-MCNC: 6.8 G/DL — SIGNIFICANT CHANGE UP (ref 6–8.3)
PROT UR-MCNC: NEGATIVE — SIGNIFICANT CHANGE UP
RBC # BLD: 4.17 M/UL — SIGNIFICANT CHANGE UP (ref 3.8–5.2)
RBC # FLD: 13.8 % — SIGNIFICANT CHANGE UP (ref 10.3–14.5)
RBC CASTS # UR COMP ASSIST: 0 /HPF — SIGNIFICANT CHANGE UP (ref 0–4)
SODIUM SERPL-SCNC: 143 MMOL/L — SIGNIFICANT CHANGE UP (ref 135–145)
SP GR SPEC: 1.01 — LOW (ref 1.01–1.02)
UROBILINOGEN FLD QL: NEGATIVE — SIGNIFICANT CHANGE UP
WBC # BLD: 9.38 K/UL — SIGNIFICANT CHANGE UP (ref 3.8–10.5)
WBC # FLD AUTO: 9.38 K/UL — SIGNIFICANT CHANGE UP (ref 3.8–10.5)
WBC UR QL: 1 /HPF — SIGNIFICANT CHANGE UP (ref 0–5)

## 2022-01-11 PROCEDURE — 76705 ECHO EXAM OF ABDOMEN: CPT | Mod: 26

## 2022-01-11 PROCEDURE — 12011 RPR F/E/E/N/L/M 2.5 CM/<: CPT

## 2022-01-11 PROCEDURE — 87086 URINE CULTURE/COLONY COUNT: CPT

## 2022-01-11 PROCEDURE — 90471 IMMUNIZATION ADMIN: CPT

## 2022-01-11 PROCEDURE — 76377 3D RENDER W/INTRP POSTPROCES: CPT | Mod: 26

## 2022-01-11 PROCEDURE — 70450 CT HEAD/BRAIN W/O DYE: CPT | Mod: MG

## 2022-01-11 PROCEDURE — 84145 PROCALCITONIN (PCT): CPT

## 2022-01-11 PROCEDURE — 72125 CT NECK SPINE W/O DYE: CPT | Mod: MG

## 2022-01-11 PROCEDURE — G1004: CPT

## 2022-01-11 PROCEDURE — 70486 CT MAXILLOFACIAL W/O DYE: CPT | Mod: 26,MG

## 2022-01-11 PROCEDURE — 83690 ASSAY OF LIPASE: CPT

## 2022-01-11 PROCEDURE — 70450 CT HEAD/BRAIN W/O DYE: CPT | Mod: 26,MG

## 2022-01-11 PROCEDURE — 71250 CT THORAX DX C-: CPT | Mod: 26,MG

## 2022-01-11 PROCEDURE — 71045 X-RAY EXAM CHEST 1 VIEW: CPT

## 2022-01-11 PROCEDURE — 73110 X-RAY EXAM OF WRIST: CPT | Mod: 26,RT

## 2022-01-11 PROCEDURE — 85025 COMPLETE CBC W/AUTO DIFF WBC: CPT

## 2022-01-11 PROCEDURE — 76377 3D RENDER W/INTRP POSTPROCES: CPT

## 2022-01-11 PROCEDURE — 76705 ECHO EXAM OF ABDOMEN: CPT

## 2022-01-11 PROCEDURE — 71045 X-RAY EXAM CHEST 1 VIEW: CPT | Mod: 26

## 2022-01-11 PROCEDURE — 80053 COMPREHEN METABOLIC PANEL: CPT

## 2022-01-11 PROCEDURE — 99284 EMERGENCY DEPT VISIT MOD MDM: CPT | Mod: FS,25

## 2022-01-11 PROCEDURE — 73110 X-RAY EXAM OF WRIST: CPT

## 2022-01-11 PROCEDURE — 99284 EMERGENCY DEPT VISIT MOD MDM: CPT | Mod: 25

## 2022-01-11 PROCEDURE — 81001 URINALYSIS AUTO W/SCOPE: CPT

## 2022-01-11 PROCEDURE — 72125 CT NECK SPINE W/O DYE: CPT | Mod: 26,MG

## 2022-01-11 PROCEDURE — 74176 CT ABD & PELVIS W/O CONTRAST: CPT | Mod: 26,MA

## 2022-01-11 PROCEDURE — 70486 CT MAXILLOFACIAL W/O DYE: CPT | Mod: MG

## 2022-01-11 PROCEDURE — 71250 CT THORAX DX C-: CPT | Mod: MG

## 2022-01-11 PROCEDURE — 90715 TDAP VACCINE 7 YRS/> IM: CPT

## 2022-01-11 PROCEDURE — 74176 CT ABD & PELVIS W/O CONTRAST: CPT | Mod: MA

## 2022-01-11 RX ORDER — TETANUS TOXOID, REDUCED DIPHTHERIA TOXOID AND ACELLULAR PERTUSSIS VACCINE, ADSORBED 5; 2.5; 8; 8; 2.5 [IU]/.5ML; [IU]/.5ML; UG/.5ML; UG/.5ML; UG/.5ML
0.5 SUSPENSION INTRAMUSCULAR ONCE
Refills: 0 | Status: COMPLETED | OUTPATIENT
Start: 2022-01-11 | End: 2022-01-11

## 2022-01-11 RX ORDER — ACETAMINOPHEN 500 MG
650 TABLET ORAL ONCE
Refills: 0 | Status: COMPLETED | OUTPATIENT
Start: 2022-01-11 | End: 2022-01-11

## 2022-01-11 RX ORDER — LIDOCAINE HCL 20 MG/ML
10 VIAL (ML) INJECTION ONCE
Refills: 0 | Status: COMPLETED | OUTPATIENT
Start: 2022-01-11 | End: 2022-01-11

## 2022-01-11 RX ADMIN — TETANUS TOXOID, REDUCED DIPHTHERIA TOXOID AND ACELLULAR PERTUSSIS VACCINE, ADSORBED 0.5 MILLILITER(S): 5; 2.5; 8; 8; 2.5 SUSPENSION INTRAMUSCULAR at 19:55

## 2022-01-11 RX ADMIN — Medication 650 MILLIGRAM(S): at 21:40

## 2022-01-11 RX ADMIN — Medication 10 MILLILITER(S): at 19:59

## 2022-01-11 NOTE — ED PROVIDER NOTE - CARE PROVIDER_API CALL
Sulemian Clay (MD)  Surgery  [address] [place] [id number]  Phone: [phone]  Fax: [phone]  Follow Up Time:

## 2022-01-11 NOTE — ED ADULT NURSE REASSESSMENT NOTE - NS ED NURSE REASSESS COMMENT FT1
Patient d/c. Reviewed d/c paperwork with patients, all questions answered at this time. Patient verbalizes understanding. IV removed. Patient instructed to return to the ER for any worsening s/s including chest pain, SOB, fever, n/v/d. Patient alert and stable at time of d/c. Patient picked up in a taxi at patient and patient's daughter's request. MADELINE Maciel organized .

## 2022-01-11 NOTE — ED ADULT NURSE NOTE - OBJECTIVE STATEMENT
89 y/o female BIBA s/p mechanical fall. A&ox4. Ambulatory. PMH HTN, HLD. Patient coming from the [facility]. Patient states she was walking on wood floor when she slipped and fell landing forward hitting her nose. Negative LOC, no anticoagulants. Patient states she was able to get herself up and called for help. Patient presents with a small laceration to the anterior nose with no active bleeding. PERRL. Patient moving all extremities and ambulatory upon arrival. Equal strength in all extremities. Negative c-spine tenderness. Patient endorses pain in the right wrist with minimal swelling noted below the thumb. Patient is able to supinate, pronate, flex, and extend b/l wrists with no difficulty. Patient denies chest pain, SOB, dizziness, blurry vision, headache, fever, chills, n/v/d, urinary symptoms, abdominal pain. Safety measures maintained. Bed in the lowest position.  MD at the bedside. No acute distress noted or further complaints at this time.

## 2022-01-11 NOTE — ED PROVIDER NOTE - CARE PLAN
1 Principal Discharge DX:	Abdominal pain   Principal Discharge DX:	Abdominal pain  Secondary Diagnosis:	Facial laceration  Secondary Diagnosis:	Head injury  Secondary Diagnosis:	Contusion, wrist

## 2022-01-11 NOTE — ED PROVIDER NOTE - NSFOLLOWUPINSTRUCTIONS_ED_ALL_ED_FT
Fall Prevention    WHAT YOU NEED TO KNOW:    Fall prevention includes ways to make your home and other areas safer. It also includes ways you can move more carefully to prevent a fall. Health conditions that cause changes in your blood pressure, vision, or muscle strength and coordination may increase your risk for falls. Medicines may also increase your risk for falls if they make you dizzy, weak, or sleepy.     DISCHARGE INSTRUCTIONS:    Call 911 or have someone else call if:     You have fallen and are unconscious.      You have fallen and cannot move part of your body.    Contact your healthcare provider if:     You have fallen and have pain or a headache.      You have questions or concerns about your condition or care.    Fall prevention tips:     Stand or sit up slowly. This may help you keep your balance and prevent falls.      Use assistive devices as directed. Your healthcare provider may suggest that you use a cane or walker to help you keep your balance. You may need to have grab bars put in your bathroom near the toilet or in the shower.      Wear shoes that fit well and have soles that . Wear shoes both inside and outside. Use slippers with good . Do not wear shoes with high heels.      Wear a personal alarm. This is a device that allows you to call 911 if you fall and need help. Ask your healthcare provider for more information.      Stay active. Exercise can help strengthen your muscles and improve your balance. Your healthcare provider may recommend water aerobics or walking. He or she may also recommend physical therapy to improve your coordination. Never start an exercise program without talking to your healthcare provider first.       Manage your medical conditions. Keep all appointments with your healthcare providers. Visit your eye doctor as directed.    Home safety tips:     Add items to prevent falls in the bathroom. Put nonslip strips on your bath or shower floor to prevent you from slipping. Use a bath mat if you do not have carpet in the bathroom. This will prevent you from falling when you step out of the bath or shower. Use a shower seat so you do not need to stand while you shower. Sit on the toilet or a chair in your bathroom to dry yourself and put on clothing. This will prevent you from losing your balance from drying or dressing yourself while you are standing.       Keep paths clear. Remove books, shoes, and other objects from walkways and stairs. Place cords for telephones and lamps out of the way so that you do not need to walk over them. Tape them down if you cannot move them. Remove small rugs. If you cannot remove a rug, secure it with double-sided tape. This will prevent you from tripping.       Install bright lights in your home. Use night lights to help light paths to the bathroom or kitchen. Always turn on the light before you start walking.      Keep items you use often on shelves within reach. Do not use a step stool to help you reach an item.      Paint or place reflective tape on the edges of your stairs. This will help you see the stairs better.    Follow up with your healthcare provider as directed: Write down your questions so you remember to ask them during your visits. Fall Prevention    WHAT YOU NEED TO KNOW:    Fall prevention includes ways to make your home and other areas safer. It also includes ways you can move more carefully to prevent a fall. Health conditions that cause changes in your blood pressure, vision, or muscle strength and coordination may increase your risk for falls. Medicines may also increase your risk for falls if they make you dizzy, weak, or sleepy.     DISCHARGE INSTRUCTIONS:    Call 911 or have someone else call if:     You have fallen and are unconscious.      You have fallen and cannot move part of your body.    Contact your healthcare provider if:     You have fallen and have pain or a headache.      You have questions or concerns about your condition or care.    Fall prevention tips:     Stand or sit up slowly. This may help you keep your balance and prevent falls.      Use assistive devices as directed. Your healthcare provider may suggest that you use a cane or walker to help you keep your balance. You may need to have grab bars put in your bathroom near the toilet or in the shower.      Wear shoes that fit well and have soles that . Wear shoes both inside and outside. Use slippers with good . Do not wear shoes with high heels.      Wear a personal alarm. This is a device that allows you to call 911 if you fall and need help. Ask your healthcare provider for more information.      Stay active. Exercise can help strengthen your muscles and improve your balance. Your healthcare provider may recommend water aerobics or walking. He or she may also recommend physical therapy to improve your coordination. Never start an exercise program without talking to your healthcare provider first.       Manage your medical conditions. Keep all appointments with your healthcare providers. Visit your eye doctor as directed.    Home safety tips:     Add items to prevent falls in the bathroom. Put nonslip strips on your bath or shower floor to prevent you from slipping. Use a bath mat if you do not have carpet in the bathroom. This will prevent you from falling when you step out of the bath or shower. Use a shower seat so you do not need to stand while you shower. Sit on the toilet or a chair in your bathroom to dry yourself and put on clothing. This will prevent you from losing your balance from drying or dressing yourself while you are standing.       Keep paths clear. Remove books, shoes, and other objects from walkways and stairs. Place cords for telephones and lamps out of the way so that you do not need to walk over them. Tape them down if you cannot move them. Remove small rugs. If you cannot remove a rug, secure it with double-sided tape. This will prevent you from tripping.       Install bright lights in your home. Use night lights to help light paths to the bathroom or kitchen. Always turn on the light before you start walking.      Keep items you use often on shelves within reach. Do not use a step stool to help you reach an item.      Paint or place reflective tape on the edges of your stairs. This will help you see the stairs better.    Follow up with your healthcare provider as directed: Write down your questions so you remember to ask them during your visits.    Return to ER in 7 days for suture removal, and sign of infection return immediately    Wrist splint wear for comfort / protection    Follow up with your primary doctor or our medicine clinic Fall Prevention    WHAT YOU NEED TO KNOW:    Fall prevention includes ways to make your home and other areas safer. It also includes ways you can move more carefully to prevent a fall. Health conditions that cause changes in your blood pressure, vision, or muscle strength and coordination may increase your risk for falls. Medicines may also increase your risk for falls if they make you dizzy, weak, or sleepy.     DISCHARGE INSTRUCTIONS:    Call 911 or have someone else call if:     You have fallen and are unconscious.      You have fallen and cannot move part of your body.    Contact your healthcare provider if:     You have fallen and have pain or a headache.      You have questions or concerns about your condition or care.    Fall prevention tips:     Stand or sit up slowly. This may help you keep your balance and prevent falls.      Use assistive devices as directed. Your healthcare provider may suggest that you use a cane or walker to help you keep your balance. You may need to have grab bars put in your bathroom near the toilet or in the shower.      Wear shoes that fit well and have soles that . Wear shoes both inside and outside. Use slippers with good . Do not wear shoes with high heels.      Wear a personal alarm. This is a device that allows you to call 911 if you fall and need help. Ask your healthcare provider for more information.      Stay active. Exercise can help strengthen your muscles and improve your balance. Your healthcare provider may recommend water aerobics or walking. He or she may also recommend physical therapy to improve your coordination. Never start an exercise program without talking to your healthcare provider first.       Manage your medical conditions. Keep all appointments with your healthcare providers. Visit your eye doctor as directed.    Home safety tips:     Add items to prevent falls in the bathroom. Put nonslip strips on your bath or shower floor to prevent you from slipping. Use a bath mat if you do not have carpet in the bathroom. This will prevent you from falling when you step out of the bath or shower. Use a shower seat so you do not need to stand while you shower. Sit on the toilet or a chair in your bathroom to dry yourself and put on clothing. This will prevent you from losing your balance from drying or dressing yourself while you are standing.       Keep paths clear. Remove books, shoes, and other objects from walkways and stairs. Place cords for telephones and lamps out of the way so that you do not need to walk over them. Tape them down if you cannot move them. Remove small rugs. If you cannot remove a rug, secure it with double-sided tape. This will prevent you from tripping.       Install bright lights in your home. Use night lights to help light paths to the bathroom or kitchen. Always turn on the light before you start walking.      Keep items you use often on shelves within reach. Do not use a step stool to help you reach an item.      Paint or place reflective tape on the edges of your stairs. This will help you see the stairs better.    Follow up with your healthcare provider as directed: Write down your questions so you remember to ask them during your visits.    Return to ER in 7 days for suture removal, and sign of infection return immediately  Ice packs to the face, you should be swollen tomorrow morning this will help keep it down.    PLace a small amount of bacitracin to the sutures 3 times a day.      Wrist splint wear for comfort / protection.  You should be re-evaluated by a hand specialist  Follow up with hand specialist Dr Clay    Follow up with your primary doctor or our medicine clinic

## 2022-01-11 NOTE — ED PROVIDER NOTE - NSFOLLOWUPCLINICS_GEN_ALL_ED_FT
[facility] General Internal Medicine  General Internal Medicine  [address] [place] [address]  Phone: [phone]  Fax:

## 2022-01-11 NOTE — ED PROCEDURE NOTE - NS ED PERI NEURO NEG
Pre-application: Motor, sensory, and vascular responses intact in the injured extremity./Post-application: Motor, sensory, and vascular responses intact in the injured extremity./The patient/caregiver verbalized understanding of how to care for the injured extremity with splint
Former smoker

## 2022-01-11 NOTE — ED PROCEDURE NOTE - CPROC ED TIME OUT STATEMENT1
“Patient's name, , procedure and correct site were confirmed during the [place] Timeout.”
“Patient's name, , procedure and correct site were confirmed during the [place] Timeout.”

## 2022-01-11 NOTE — ED PROVIDER NOTE - ATTENDING CONTRIBUTION TO CARE
Attending MD Dalia Mello:  I personally have seen and examined this patient.  Resident note reviewed and agree on plan of care and except where noted.  See HPI, PE, and MDM for details.

## 2022-01-11 NOTE — ED PROVIDER NOTE - PROGRESS NOTE DETAILS
Attending Dalia Mello: xray negative for fracture of the wrist. pt with ttp snuffbox, will place in splint. Attending Dalia Mello: ct scans negative for traumatic injury. pt ambulating wtihout any sob. abdominal pain improved. d/w pt findings of possible early colitis. pt denies any diarrhea and abdominal pain resolved. will hold off on abx at this time. pt ambulating without difficulty. d/w pt close return precautions. pt wants to go home. d/w family who is aware. no neck pain or ttp. no focal neurologic deficit

## 2022-01-11 NOTE — ED PROVIDER NOTE - OBJECTIVE STATEMENT
89y/o F w/ h/o HLD, glaucoma , macular degeneration, mild cognitive impairment coming in after a trip and fall today. 89y/o F w/ h/o HLD, glaucoma, macular degeneration, mild cognitive impairment coming in after a trip and fall today. Pt was walking and tripped on the wooden floor today, falling on her anterior right forehead. Admits to a cut on her nose. Denies LOC. Denies lightheadedness, vertigo, headaches, dizziness, chest pain, SOB, abdominal pain, dysuria, hematuria, dyschezia, hematochezia.

## 2022-01-11 NOTE — ED PROVIDER NOTE - PATIENT PORTAL LINK FT
You can access the FollowMyHealth Patient Portal offered by [facility] by registering at the following website: [email or web address]. By joining [employer]’s FollowMyHealth portal, you will also be able to view your health information using other applications (apps) compatible with our system.

## 2022-01-11 NOTE — ED PROVIDER NOTE - NS ED ROS FT
CONSTITUTIONAL: No fever, weight loss, or fatigue  EYES: No eye pain, visual disturbances, or discharge  ENMT:  No difficulty hearing, tinnitus, vertigo; No sinus or throat pain. +Nasal laceration  NECK: No pain or stiffness  RESPIRATORY: No cough, wheezing, chills or hemoptysis; No shortness of breath  CARDIOVASCULAR: No chest pain, palpitations, dizziness, or leg swelling  GASTROINTESTINAL: No abdominal or epigastric pain. No nausea, vomiting, or hematemesis; No diarrhea or constipation. No melena or hematochezia.  GENITOURINARY: No dysuria, frequency, hematuria, or incontinence  NEUROLOGICAL: No headaches, memory loss, loss of strength, numbness, or tremors  SKIN: No itching, burning, rashes, or lesions

## 2022-01-11 NOTE — ED PROVIDER NOTE - PHYSICAL EXAMINATION
GENERAL: well appearing in no acute distress, non-toxic appearing  HEAD: normocephalic, atraumatic  HEENT: normal conjunctiva, oral mucosa moist, uvula midline, no tonsilar exudates, neck supple, no JVD. +0.5cm laceration on the nose.   CARDIAC: regular rate and rhythm, normal S1S2, no appreciable murmurs, 2+ pulses in UE/LE b/l  PULM: normal breath sounds, clear to ascultation bilaterally, no rales, rhonchi, wheezing  GI: abdomen nondistended, soft, tenderness to palpation in the epigastric region, no guarding, rebound tenderness  : no CVA tenderness b/l, no suprapubic tenderness  NEURO: no focal motor or sensory deficits, CN2-12 intact, normal speech, PERRLA, EOMI, AAOx3  MSK: no peripheral edema, no calf tenderness b/l  SKIN: well-perfused, extremities warm, no visible rashes  PSYCH: appropriate mood and affect GENERAL: well appearing in no acute distress, non-toxic appearing  HEAD: normocephalic, atraumatic  HEENT: normal conjunctiva, oral mucosa moist, uvula midline, no tonsilar exudates, neck supple, no JVD. +0.5cm laceration on the nose.   CARDIAC: regular rate and rhythm, normal S1S2, no appreciable murmurs, 2+ pulses in UE/LE b/l  PULM: normal breath sounds, clear to ascultation bilaterally, no rales, rhonchi, wheezing  GI: abdomen nondistended, soft, tenderness to palpation in the epigastric region, no guarding, rebound tenderness  : no CVA tenderness b/l, no suprapubic tenderness  NEURO: no focal motor or sensory deficits, CN2-12 intact, normal speech, PERRLA, EOMI, AAOx3  MSK: no peripheral edema, no calf tenderness b/l  SKIN: well-perfused, extremities warm, no visible rashes  PSYCH: appropriate mood and affect  Attending Dalia Mello: Gen: NAD, heent: ecchymoes and swelling to nose, 1cm laceration linear to top of nasal bridge, no septal hematoma, , eomi, perrla, mmm, op pink, uvula midline, neck; nttp, no nuchal rigidity, chest: nttp, no crepitus, cv: rrr, no murmurs, lungs: ctab, abd: soft, mild ttp RLQ, no guarding, ext: wwp, ttp right distal radius, no deformity, ttp anatomic snuffbox. sensation intact, skin: no rash, neuro: awake and alert, following commands, speech clear, sensation and strength intact, no focal deficits

## 2022-01-11 NOTE — ED PROVIDER NOTE - CLINICAL SUMMARY MEDICAL DECISION MAKING FREE TEXT BOX
89y/o F c/o mechanical fall. Will screen for ICH w/ CT head and nasal fracture/dislocation w/ CT maxillofacial. Will scan patient's stomach due to tenderness and check urine. 89y/o F c/o mechanical fall. Will screen for ICH w/ CT head and nasal fracture/dislocation w/ CT maxillofacial. Will scan patient's stomach due to tenderness and check urine.  Attending Dalia Mello: 89 yo female presenting after fall. pt was walking and tropped and fell forward. no LOC. upon arrival pt complaining of pain to right wrist. no deforimty and cap refill intact. xray performed which was negative for acute fracture. with ttp will place in  splint. pt with close head injury with nasal laceration. ct scan ordered to further evaluate. pt also with mild RLQ ttp. h/o diverticulitis in the past. no diarrhea or fevers. ct scan performed which was negative for trauamtic injury, and BP stable makig solid organ injury less likely. pt denies any sob or difficulty breathing. no evidence of chest wall ttp or evidence of rib fracture. pt ambulating without difficulty. d/w pt concussionand infection precautions for laceration that was repaired.

## 2022-01-11 NOTE — ED ADULT NURSE NOTE - NSIMPLEMENTINTERV_GEN_ALL_ED
Implemented All Universal Safety Interventions:  [place] to call system. Call bell, personal items and telephone within reach. Instruct patient to call for assistance. Room bathroom lighting operational. Non-slip footwear when patient is off stretcher. Physically safe environment: no spills, clutter or unnecessary equipment. Stretcher in lowest position, wheels locked, appropriate side rails in place.

## 2022-01-12 LAB
CULTURE RESULTS: SIGNIFICANT CHANGE UP
SPECIMEN SOURCE: SIGNIFICANT CHANGE UP

## 2022-01-13 ENCOUNTER — NON-APPOINTMENT (OUTPATIENT)
Age: 87
End: 2022-01-13

## 2022-09-04 ENCOUNTER — NON-APPOINTMENT (OUTPATIENT)
Age: 87
End: 2022-09-04

## 2022-09-21 ENCOUNTER — NON-APPOINTMENT (OUTPATIENT)
Age: 87
End: 2022-09-21

## 2022-10-17 NOTE — ED CLERICAL - BED REQUESTED
08-Jun-2021 01:35 Azelaic Acid Counseling: Patient counseled that medicine may cause skin irritation and to avoid applying near the eyes.  In the event of skin irritation, the patient was advised to reduce the amount of the drug applied or use it less frequently.   The patient verbalized understanding of the proper use and possible adverse effects of azelaic acid.  All of the patient's questions and concerns were addressed.

## 2022-11-06 ENCOUNTER — NON-APPOINTMENT (OUTPATIENT)
Age: 87
End: 2022-11-06

## 2022-11-28 ENCOUNTER — APPOINTMENT (OUTPATIENT)
Dept: INTERNAL MEDICINE | Facility: CLINIC | Age: 87
End: 2022-11-28

## 2022-11-28 ENCOUNTER — NON-APPOINTMENT (OUTPATIENT)
Age: 87
End: 2022-11-28

## 2022-11-28 VITALS
OXYGEN SATURATION: 91 % | TEMPERATURE: 98.2 F | HEIGHT: 58 IN | DIASTOLIC BLOOD PRESSURE: 68 MMHG | SYSTOLIC BLOOD PRESSURE: 148 MMHG | RESPIRATION RATE: 15 BRPM | WEIGHT: 134 LBS | BODY MASS INDEX: 28.13 KG/M2 | HEART RATE: 86 BPM

## 2022-11-28 DIAGNOSIS — E78.5 HYPERLIPIDEMIA, UNSPECIFIED: ICD-10-CM

## 2022-11-28 DIAGNOSIS — K21.9 GASTRO-ESOPHAGEAL REFLUX DISEASE W/OUT ESOPHAGITIS: ICD-10-CM

## 2022-11-28 DIAGNOSIS — K59.09 OTHER CONSTIPATION: ICD-10-CM

## 2022-11-28 DIAGNOSIS — Z00.00 ENCOUNTER FOR GENERAL ADULT MEDICAL EXAMINATION W/OUT ABNORMAL FINDINGS: ICD-10-CM

## 2022-11-28 DIAGNOSIS — E03.9 HYPOTHYROIDISM, UNSPECIFIED: ICD-10-CM

## 2022-11-28 DIAGNOSIS — F32.A DEPRESSION, UNSPECIFIED: ICD-10-CM

## 2022-11-28 PROCEDURE — 81003 URINALYSIS AUTO W/O SCOPE: CPT | Mod: QW

## 2022-11-28 PROCEDURE — 93000 ELECTROCARDIOGRAM COMPLETE: CPT | Mod: 59

## 2022-11-28 PROCEDURE — G0439: CPT

## 2022-11-28 PROCEDURE — 36415 COLL VENOUS BLD VENIPUNCTURE: CPT

## 2022-11-28 RX ORDER — MIRTAZAPINE 45 MG/1
45 TABLET, FILM COATED ORAL
Qty: 30 | Refills: 5 | Status: ACTIVE | COMMUNITY
Start: 2019-03-27

## 2022-11-28 RX ORDER — ZOLPIDEM TARTRATE 5 MG/1
5 TABLET ORAL
Qty: 30 | Refills: 3 | Status: DISCONTINUED | OUTPATIENT
Start: 2018-02-05 | End: 2022-11-28

## 2022-11-28 RX ORDER — VANCOMYCIN HYDROCHLORIDE 125 MG/1
125 CAPSULE ORAL 4 TIMES DAILY
Qty: 40 | Refills: 4 | Status: DISCONTINUED | COMMUNITY
Start: 2021-06-21 | End: 2022-11-28

## 2022-11-28 RX ORDER — ASPIRIN 325 MG/1
325 TABLET ORAL
Qty: 100 | Refills: 2 | Status: DISCONTINUED | COMMUNITY
Start: 2020-01-21 | End: 2022-11-28

## 2022-11-28 RX ORDER — SERTRALINE HYDROCHLORIDE 50 MG/1
50 TABLET, FILM COATED ORAL
Qty: 90 | Refills: 1 | Status: ACTIVE | COMMUNITY
Start: 2022-11-28

## 2022-11-28 NOTE — HEALTH RISK ASSESSMENT
[Fair] :  ~his/her~ mood as fair [No] : No [Never (0 pts)] : Never (0 points) [1] : 2) Feeling down, depressed, or hopeless for several days (1) [PHQ-2 Negative - No further assessment needed] : PHQ-2 Negative - No further assessment needed [[id number]] : 2 [Change in mental status noted] : Change in mental status noted [Language] : denies difficulty with language [Behavior] : denies difficulty with behavior [Learning/Retaining New Information] : difficulty learning/retaining new information [Handling Complex Tasks] : difficulty handling complex tasks [Reasoning] : difficulty with reasoning [Spatial Ability and Orientation] : difficulty with spatial ability and orientation [None] : None [Alone] : lives alone [Feels Safe at Home] : Feels safe at home [Fully functional (bathing, dressing, toileting, transferring, walking, feeding)] : Fully functional (bathing, dressing, toileting, transferring, walking, feeding) [Fully functional (using the telephone, shopping, preparing meals, housekeeping, doing laundry, using] : Fully functional and needs no help or supervision to perform IADLs (using the telephone, shopping, preparing meals, housekeeping, doing laundry, using transportation, managing medications and managing finances) [Reports changes in hearing] : Reports no changes in hearing [Reports changes in vision] : Reports no changes in vision [Reports changes in dental health] : Reports no changes in dental health [Smoke Detector] : smoke detector [Carbon Monoxide Detector] : carbon monoxide detector [Guns at Home] : no guns at home [Seat Belt] :  uses seat belt

## 2022-11-28 NOTE — HISTORY OF PRESENT ILLNESS
[de-identified] : Annual\par Had all vaccine\par \par depression on med\par gerd on med\par high cholesterol on med\par constipation on med\par memory/dementia in assisted living on donepezil and namenda\par \par \par

## 2022-11-28 NOTE — PLAN
[FreeTextEntry1] : Annual\par Had all vaccine\par \par full lab and ekg to check qt\par on complicated psych med\par mirtapezine 45 and sertraline 50?\par Alao on Donepezil 10 and namenda 28\par

## 2022-11-29 LAB
ALBUMIN SERPL ELPH-MCNC: 4.1 G/DL
ALP BLD-CCNC: 103 U/L
ALT SERPL-CCNC: 11 U/L
ANION GAP SERPL CALC-SCNC: 11 MMOL/L
AST SERPL-CCNC: 19 U/L
BASOPHILS # BLD AUTO: 0.06 K/UL
BASOPHILS NFR BLD AUTO: 0.7 %
BILIRUB SERPL-MCNC: 0.3 MG/DL
BILIRUB UR QL STRIP: NEGATIVE
BUN SERPL-MCNC: 34 MG/DL
CALCIUM SERPL-MCNC: 11.3 MG/DL
CHLORIDE SERPL-SCNC: 105 MMOL/L
CHOLEST SERPL-MCNC: 188 MG/DL
CO2 SERPL-SCNC: 25 MMOL/L
CREAT SERPL-MCNC: 1.24 MG/DL
EGFR: 40 ML/MIN/1.73M2
EOSINOPHIL # BLD AUTO: 0.26 K/UL
EOSINOPHIL NFR BLD AUTO: 2.9 %
ESTIMATED AVERAGE GLUCOSE: 120 MG/DL
FOLATE SERPL-MCNC: 11.4 NG/ML
GLUCOSE SERPL-MCNC: 103 MG/DL
GLUCOSE UR-MCNC: NEGATIVE
HBA1C MFR BLD HPLC: 5.8 %
HCG UR QL: 0.2 EU/DL
HCT VFR BLD CALC: 39.4 %
HDLC SERPL-MCNC: 66 MG/DL
HGB BLD-MCNC: 12.6 G/DL
HGB UR QL STRIP.AUTO: NEGATIVE
IMM GRANULOCYTES NFR BLD AUTO: 0.9 %
KETONES UR-MCNC: NEGATIVE
LDLC SERPL CALC-MCNC: 87 MG/DL
LEUKOCYTE ESTERASE UR QL STRIP: NORMAL
LYMPHOCYTES # BLD AUTO: 2.58 K/UL
LYMPHOCYTES NFR BLD AUTO: 28.9 %
MAN DIFF?: NORMAL
MCHC RBC-ENTMCNC: 30.4 PG
MCHC RBC-ENTMCNC: 32 GM/DL
MCV RBC AUTO: 94.9 FL
MONOCYTES # BLD AUTO: 0.79 K/UL
MONOCYTES NFR BLD AUTO: 8.8 %
NEUTROPHILS # BLD AUTO: 5.16 K/UL
NEUTROPHILS NFR BLD AUTO: 57.8 %
NITRITE UR QL STRIP: NEGATIVE
NONHDLC SERPL-MCNC: 121 MG/DL
PH UR STRIP: 5.5
PLATELET # BLD AUTO: 203 K/UL
POTASSIUM SERPL-SCNC: 4.7 MMOL/L
PROT SERPL-MCNC: 6.8 G/DL
PROT UR STRIP-MCNC: NEGATIVE
RBC # BLD: 4.15 M/UL
RBC # FLD: 14.3 %
SODIUM SERPL-SCNC: 140 MMOL/L
SP GR UR STRIP: >=1.03
T4 FREE SERPL-MCNC: 0.9 NG/DL
TRIGL SERPL-MCNC: 172 MG/DL
TSH SERPL-ACNC: 3.76 UIU/ML
VIT B12 SERPL-MCNC: 370 PG/ML
WBC # FLD AUTO: 8.93 K/UL

## 2023-01-01 NOTE — H&P ADULT - PROBLEM SELECTOR PLAN 1
imaging w/ findings c/w diverticulitis , labs w/ leukocytosis , otherwise HD stable   - continue cipro and flagyl   - if symptoms persist GI consult can be obtained by day team   - trend WBC count None

## 2023-09-06 NOTE — ED ADULT TRIAGE NOTE - SPO2 (%)
follow up on:  complex medical decision making related to goals of care    [facility] Geriatric and Palliative Consult Service:  Kaila Hoff DO: cell ([phone])  Simon Varela MD: cell [phone])  Krishan Covarrubias NP: cell ([phone])   Ramon Samuel LMSW: cell ([phone])   Keesha Roblero NP: via [employer] Teams    Can contact any Palliative Team member via [employer] Teams for consults and questions    OVERNIGHT EVENTS: patient    Present Symptoms: Appears comfortable  Review of Systems: [ Unable to obtain due to poor mentation]    CPOT:    https://[email or web address]/Critical-Care-Pain-Observation-Tool-(CPOT)  PAIN AD Score:   [email or web address] (press ctrl +  left click to view)MEDICATIONS  (STANDING):    MEDICATIONS  (PRN):  glycopyrrolate Injectable 0.4 milliGRAM(s) IV Push four times a day PRN secretions  HYDROmorphone  Injectable 0.2 milliGRAM(s) IV Push every 2 hours PRN Moderate pain (4-6), Severe pain (7-10), Respiratory rate greater than 22  LORazepam   Injectable 0.5 milliGRAM(s) IV Push every 4 hours PRN Anxiety  ondansetron Injectable 4 milliGRAM(s) IV Push every 6 hours PRN Nausea and/or Vomiting      PHYSICAL EXAM:  Vital Signs Last 24 Hrs  T(C): 37.6 (06 Sep 2023 05:31), Max: 38.6 (06 Sep 2023 00:47)  T(F): 99.7 (06 Sep 2023 05:31), Max: 101.4 (06 Sep 2023 00:47)  HR: 86 (06 Sep 2023 05:31) (86 - 109)  BP: 121/64 (06 Sep 2023 05:31) (110/75 - 132/69)  BP(mean): 91 (05 Sep 2023 13:50) (79 - 91)  RR: 24 (06 Sep 2023 05:31) (24 - 34)  SpO2: 98% (06 Sep 2023 05:31) (93% - 98%)    Parameters below as of 06 Sep 2023 05:31  Patient On (Oxygen Delivery Method): mask, nonrebreather  O2 Flow (L/min): 10      General: lethargic; unarousable  Palliative Performance Scale/Karnofsky Score:  [email or web address]/files/news/palliative_performance_scale_ppsv2.pdf    Lungs: audible excessive secretions  :mckeon      LABS:                          14.5   34.86 )-----------( 196      ( 05 Sep 2023 11:10 )             43.7         139  |  103  |  49<H>  ----------------------------<  186<H>  4.0   |  18<L>  |  4.14<H>    Ca    9.4      05 Sep 2023 11:10    TPro  7.5  /  Alb  2.4<L>  /  TBili  0.6  /  DBili  x   /  AST  61<H>  /  ALT  21  /  AlkPhos  102  09-05    Urinalysis Basic - ( 05 Sep 2023 11:10 )    Color: Orange / Appearance: Turbid / S.016 / pH: x  Gluc: 186 mg/dL / Ketone: Negative mg/dL  / Bili: Negative / Urobili: 0.2 mg/dL   Blood: x / Protein: >=1000 mg/dL / Nitrite: Negative   Leuk Esterase: Moderate / RBC: 5 /HPF / WBC 2 /HPF   Sq Epi: x / Non Sq Epi: x / Bacteria: Many /HPF        RADIOLOGY & ADDITIONAL STUDIES: 94 follow up on:  complex medical decision making related to goals of care    [facility] Geriatric and Palliative Consult Service:  Kaila Hoff DO: cell [phone])  Simon Varela MD: cell [phone])  Krishan Covarrubias NP: cell [phone])   Ramon Samuel LMSW: cell ([phone])   Keesha Roblero NP: via [employer] Teams    Can contact any Palliative Team member via [employer] Teams for consults and questions    OVERNIGHT EVENTS: patient    Present Symptoms:  Patient remains unresponsive, occasionally reaching for NRB mask.  Mild resting tachypnea noted.  Required PRN IV Dilaudid x 2 doses and IV glycopyrrolate x 1 within last 24 hours.    Review of Systems: [ Unable to obtain due to poor mentation]    CPOT:  0  https://[email or web address]/getattachment/[email or web address]/Critical-Care-Pain-Observation-Tool-(CPOT)  PAIN AD Score:  0  [email or web address]/[email or web address].pdf (press ctrl +  left click to view)MEDICATIONS  (STANDING):    MEDICATIONS  (PRN):  glycopyrrolate Injectable 0.4 milliGRAM(s) IV Push four times a day PRN secretions  HYDROmorphone  Injectable 0.2 milliGRAM(s) IV Push every 2 hours PRN Moderate pain (4-6), Severe pain (7-10), Respiratory rate greater than 22  LORazepam   Injectable 0.5 milliGRAM(s) IV Push every 4 hours PRN Anxiety  ondansetron Injectable 4 milliGRAM(s) IV Push every 6 hours PRN Nausea and/or Vomiting      PHYSICAL EXAM:  Vital Signs Last 24 Hrs  T(C): 37.6 (06 Sep 2023 05:31), Max: 38.6 (06 Sep 2023 00:47)  T(F): 99.7 (06 Sep 2023 05:31), Max: 101.4 (06 Sep 2023 00:47)  HR: 86 (06 Sep 2023 05:31) (86 - 109)  BP: 121/64 (06 Sep 2023 05:31) (110/75 - 132/69)  BP(mean): 91 (05 Sep 2023 13:50) (79 - 91)  RR: 24 (06 Sep 2023 05:31) (24 - 34)  SpO2: 98% (06 Sep 2023 05:31) (93% - 98%)    Parameters below as of 06 Sep 2023 05:31  Patient On (Oxygen Delivery Method): mask, nonrebreather  O2 Flow (L/min): 10      General: lethargic; unarousable  Palliative Performance Scale/Karnofsky Score:  [email or web address]/news/palliative_performance_scale_ppsv2.pdf    Lungs: audible excessive secretions  :mckeon      LABS:                          14.5   34.86 )-----------( 196      ( 05 Sep 2023 11:10 )             43.7         139  |  103  |  49<H>  ----------------------------<  186<H>  4.0   |  18<L>  |  4.14<H>    Ca    9.4      05 Sep 2023 11:10    TPro  7.5  /  Alb  2.4<L>  /  TBili  0.6  /  DBili  x   /  AST  61<H>  /  ALT  21  /  AlkPhos  102  09-05    Urinalysis Basic - ( 05 Sep 2023 11:10 )    Color: Orange / Appearance: Turbid / S.016 / pH: x  Gluc: 186 mg/dL / Ketone: Negative mg/dL  / Bili: Negative / Urobili: 0.2 mg/dL   Blood: x / Protein: >=1000 mg/dL / Nitrite: Negative   Leuk Esterase: Moderate / RBC: 5 /HPF / WBC 2 /HPF   Sq Epi: x / Non Sq Epi: x / Bacteria: Many /HPF        RADIOLOGY & ADDITIONAL STUDIES:

## 2024-04-06 ENCOUNTER — TRANSCRIPTION ENCOUNTER (OUTPATIENT)
Age: 89
End: 2024-04-06

## 2024-05-03 ENCOUNTER — EMERGENCY (EMERGENCY)
Facility: HOSPITAL | Age: 89
LOS: 1 days | Discharge: HOME | End: 2024-05-03
Attending: EMERGENCY MEDICINE
Payer: MEDICARE

## 2024-05-03 VITALS
OXYGEN SATURATION: 94 % | TEMPERATURE: 98 F | DIASTOLIC BLOOD PRESSURE: 67 MMHG | SYSTOLIC BLOOD PRESSURE: 153 MMHG | HEIGHT: 63 IN | WEIGHT: 115.08 LBS | HEART RATE: 72 BPM | RESPIRATION RATE: 18 BRPM

## 2024-05-03 LAB
ALBUMIN SERPL ELPH-MCNC: 4 G/DL — SIGNIFICANT CHANGE UP (ref 3.3–5)
ALP SERPL-CCNC: 122 U/L — HIGH (ref 40–120)
ALT FLD-CCNC: 15 U/L — SIGNIFICANT CHANGE UP (ref 10–45)
ANION GAP SERPL CALC-SCNC: 10 MMOL/L — SIGNIFICANT CHANGE UP (ref 5–17)
APPEARANCE UR: CLEAR — SIGNIFICANT CHANGE UP
AST SERPL-CCNC: 26 U/L — SIGNIFICANT CHANGE UP (ref 10–40)
BACTERIA # UR AUTO: NEGATIVE /HPF — SIGNIFICANT CHANGE UP
BASE EXCESS BLDV CALC-SCNC: -0.5 MMOL/L — SIGNIFICANT CHANGE UP (ref -2–3)
BASOPHILS # BLD AUTO: 0.07 K/UL — SIGNIFICANT CHANGE UP (ref 0–0.2)
BASOPHILS NFR BLD AUTO: 0.7 % — SIGNIFICANT CHANGE UP (ref 0–2)
BILIRUB SERPL-MCNC: 0.2 MG/DL — SIGNIFICANT CHANGE UP (ref 0.2–1.2)
BILIRUB UR-MCNC: NEGATIVE — SIGNIFICANT CHANGE UP
BUN SERPL-MCNC: 25 MG/DL — HIGH (ref 7–23)
CA-I SERPL-SCNC: 1.49 MMOL/L — HIGH (ref 1.15–1.33)
CALCIUM SERPL-MCNC: 11.6 MG/DL — HIGH (ref 8.4–10.5)
CAST: 1 /LPF — SIGNIFICANT CHANGE UP (ref 0–4)
CHLORIDE BLDV-SCNC: 107 MMOL/L — SIGNIFICANT CHANGE UP (ref 96–108)
CHLORIDE SERPL-SCNC: 108 MMOL/L — SIGNIFICANT CHANGE UP (ref 96–108)
CO2 BLDV-SCNC: 25 MMOL/L — SIGNIFICANT CHANGE UP (ref 22–26)
CO2 SERPL-SCNC: 22 MMOL/L — SIGNIFICANT CHANGE UP (ref 22–31)
COLOR SPEC: YELLOW — SIGNIFICANT CHANGE UP
CREAT SERPL-MCNC: 1.25 MG/DL — SIGNIFICANT CHANGE UP (ref 0.5–1.3)
DIFF PNL FLD: NEGATIVE — SIGNIFICANT CHANGE UP
EGFR: 39 ML/MIN/1.73M2 — LOW
EOSINOPHIL # BLD AUTO: 0.31 K/UL — SIGNIFICANT CHANGE UP (ref 0–0.5)
EOSINOPHIL NFR BLD AUTO: 3.3 % — SIGNIFICANT CHANGE UP (ref 0–6)
GAS PNL BLDV: 133 MMOL/L — LOW (ref 136–145)
GAS PNL BLDV: SIGNIFICANT CHANGE UP
GLUCOSE BLDV-MCNC: 90 MG/DL — SIGNIFICANT CHANGE UP (ref 70–99)
GLUCOSE SERPL-MCNC: 98 MG/DL — SIGNIFICANT CHANGE UP (ref 70–99)
GLUCOSE UR QL: NEGATIVE MG/DL — SIGNIFICANT CHANGE UP
HCO3 BLDV-SCNC: 24 MMOL/L — SIGNIFICANT CHANGE UP (ref 22–29)
HCT VFR BLD CALC: 37.2 % — SIGNIFICANT CHANGE UP (ref 34.5–45)
HCT VFR BLDA CALC: 37 % — SIGNIFICANT CHANGE UP (ref 34.5–46.5)
HGB BLD CALC-MCNC: 12.3 G/DL — SIGNIFICANT CHANGE UP (ref 11.7–16.1)
HGB BLD-MCNC: 12.4 G/DL — SIGNIFICANT CHANGE UP (ref 11.5–15.5)
IMM GRANULOCYTES NFR BLD AUTO: 0.5 % — SIGNIFICANT CHANGE UP (ref 0–0.9)
KETONES UR-MCNC: NEGATIVE MG/DL — SIGNIFICANT CHANGE UP
LACTATE BLDV-MCNC: 0.8 MMOL/L — SIGNIFICANT CHANGE UP (ref 0.5–2)
LEUKOCYTE ESTERASE UR-ACNC: ABNORMAL
LIDOCAIN IGE QN: 45 U/L — SIGNIFICANT CHANGE UP (ref 7–60)
LYMPHOCYTES # BLD AUTO: 2.79 K/UL — SIGNIFICANT CHANGE UP (ref 1–3.3)
LYMPHOCYTES # BLD AUTO: 29.8 % — SIGNIFICANT CHANGE UP (ref 13–44)
MCHC RBC-ENTMCNC: 31.1 PG — SIGNIFICANT CHANGE UP (ref 27–34)
MCHC RBC-ENTMCNC: 33.3 GM/DL — SIGNIFICANT CHANGE UP (ref 32–36)
MCV RBC AUTO: 93.2 FL — SIGNIFICANT CHANGE UP (ref 80–100)
MONOCYTES # BLD AUTO: 1.09 K/UL — HIGH (ref 0–0.9)
MONOCYTES NFR BLD AUTO: 11.6 % — SIGNIFICANT CHANGE UP (ref 2–14)
NEUTROPHILS # BLD AUTO: 5.06 K/UL — SIGNIFICANT CHANGE UP (ref 1.8–7.4)
NEUTROPHILS NFR BLD AUTO: 54.1 % — SIGNIFICANT CHANGE UP (ref 43–77)
NITRITE UR-MCNC: NEGATIVE — SIGNIFICANT CHANGE UP
NRBC # BLD: 0 /100 WBCS — SIGNIFICANT CHANGE UP (ref 0–0)
PCO2 BLDV: 39 MMHG — SIGNIFICANT CHANGE UP (ref 39–42)
PH BLDV: 7.4 — SIGNIFICANT CHANGE UP (ref 7.32–7.43)
PH UR: 6.5 — SIGNIFICANT CHANGE UP (ref 5–8)
PLATELET # BLD AUTO: 181 K/UL — SIGNIFICANT CHANGE UP (ref 150–400)
PO2 BLDV: 51 MMHG — HIGH (ref 25–45)
POTASSIUM BLDV-SCNC: 6.7 MMOL/L — CRITICAL HIGH (ref 3.5–5.1)
POTASSIUM SERPL-MCNC: 4.6 MMOL/L — SIGNIFICANT CHANGE UP (ref 3.5–5.3)
POTASSIUM SERPL-SCNC: 4.6 MMOL/L — SIGNIFICANT CHANGE UP (ref 3.5–5.3)
PROT SERPL-MCNC: 6.9 G/DL — SIGNIFICANT CHANGE UP (ref 6–8.3)
PROT UR-MCNC: NEGATIVE MG/DL — SIGNIFICANT CHANGE UP
RBC # BLD: 3.99 M/UL — SIGNIFICANT CHANGE UP (ref 3.8–5.2)
RBC # FLD: 14.2 % — SIGNIFICANT CHANGE UP (ref 10.3–14.5)
RBC CASTS # UR COMP ASSIST: 0 /HPF — SIGNIFICANT CHANGE UP (ref 0–4)
REVIEW: SIGNIFICANT CHANGE UP
SAO2 % BLDV: 81.9 % — SIGNIFICANT CHANGE UP (ref 67–88)
SODIUM SERPL-SCNC: 140 MMOL/L — SIGNIFICANT CHANGE UP (ref 135–145)
SP GR SPEC: 1.01 — SIGNIFICANT CHANGE UP (ref 1–1.03)
SQUAMOUS # UR AUTO: 1 /HPF — SIGNIFICANT CHANGE UP (ref 0–5)
UROBILINOGEN FLD QL: 0.2 MG/DL — SIGNIFICANT CHANGE UP (ref 0.2–1)
WBC # BLD: 9.37 K/UL — SIGNIFICANT CHANGE UP (ref 3.8–10.5)
WBC # FLD AUTO: 9.37 K/UL — SIGNIFICANT CHANGE UP (ref 3.8–10.5)
WBC UR QL: 5 /HPF — SIGNIFICANT CHANGE UP (ref 0–5)

## 2024-05-03 PROCEDURE — 93005 ELECTROCARDIOGRAM TRACING: CPT

## 2024-05-03 PROCEDURE — 99285 EMERGENCY DEPT VISIT HI MDM: CPT

## 2024-05-03 PROCEDURE — 85025 COMPLETE CBC W/AUTO DIFF WBC: CPT

## 2024-05-03 PROCEDURE — 80053 COMPREHEN METABOLIC PANEL: CPT

## 2024-05-03 PROCEDURE — 83605 ASSAY OF LACTIC ACID: CPT

## 2024-05-03 PROCEDURE — 82330 ASSAY OF CALCIUM: CPT

## 2024-05-03 PROCEDURE — 71045 X-RAY EXAM CHEST 1 VIEW: CPT | Mod: 26

## 2024-05-03 PROCEDURE — 71045 X-RAY EXAM CHEST 1 VIEW: CPT

## 2024-05-03 PROCEDURE — 81001 URINALYSIS AUTO W/SCOPE: CPT

## 2024-05-03 PROCEDURE — 85014 HEMATOCRIT: CPT

## 2024-05-03 PROCEDURE — 93010 ELECTROCARDIOGRAM REPORT: CPT

## 2024-05-03 PROCEDURE — 83690 ASSAY OF LIPASE: CPT

## 2024-05-03 PROCEDURE — 82947 ASSAY GLUCOSE BLOOD QUANT: CPT

## 2024-05-03 PROCEDURE — 74176 CT ABD & PELVIS W/O CONTRAST: CPT | Mod: 26,MC

## 2024-05-03 PROCEDURE — 84132 ASSAY OF SERUM POTASSIUM: CPT

## 2024-05-03 PROCEDURE — 85018 HEMOGLOBIN: CPT

## 2024-05-03 PROCEDURE — 99285 EMERGENCY DEPT VISIT HI MDM: CPT | Mod: 25

## 2024-05-03 PROCEDURE — 82435 ASSAY OF BLOOD CHLORIDE: CPT

## 2024-05-03 PROCEDURE — 82803 BLOOD GASES ANY COMBINATION: CPT

## 2024-05-03 PROCEDURE — 84295 ASSAY OF SERUM SODIUM: CPT

## 2024-05-03 PROCEDURE — 74176 CT ABD & PELVIS W/O CONTRAST: CPT | Mod: MC

## 2024-05-03 PROCEDURE — 87086 URINE CULTURE/COLONY COUNT: CPT

## 2024-05-03 PROCEDURE — 36000 PLACE NEEDLE IN VEIN: CPT

## 2024-05-03 NOTE — ED PROVIDER NOTE - OTHER FINDINGS
ECG recorded at 2000 independently interpreted by me , Dr Sam Solorio,  at 2041 shows normal sinus rhythm left axis deviation some baseline wander in lead aVL and lead aVF however no acute diagnostic ischemic findings

## 2024-05-03 NOTE — ED PROVIDER NOTE - NSFOLLOWUPINSTRUCTIONS_ED_ALL_ED_FT
Please follow up with your primary care physician within the next 3-4 days.    Please continue taking your medications as prescribed by your doctors.    Please return to the emergency department if you experience any of the following symptoms:    Fever  Chest pain  Difficulty breathing  Abdominal pain  Nausea  Vomiting

## 2024-05-03 NOTE — ED ADULT NURSE NOTE - NSFALLHARMRISKINTERV_ED_ALL_ED
Assistance OOB with selected safe patient handling equipment if applicable/Assistance with ambulation/Communicate risk of Fall with Harm to all staff, patient, and family/Monitor gait and stability/Monitor for mental status changes and reorient to person, place, and time, as needed/Provide visual cue: red socks, yellow wristband, yellow gown, etc/Reinforce activity limits and safety measures with patient and family/Toileting schedule using arm’s reach rule for commode and bathroom/Use of alarms - bed, stretcher, chair and/or video monitoring/Bed in lowest position, wheels locked, appropriate side rails in place/Call bell, personal items and telephone in reach/Instruct patient to call for assistance before getting out of bed/chair/stretcher/Non-slip footwear applied when patient is off stretcher/[place] to call system/Physically safe environment - no spills, clutter or unnecessary equipment/Purposeful Proactive Rounding/Room/bathroom lighting operational, light cord in reach

## 2024-05-03 NOTE — ED PROVIDER NOTE - CLINICAL SUMMARY MEDICAL DECISION MAKING FREE TEXT BOX
89-year-old female with a past medical history of HLD, glaucoma, macular degeneration, mild cognitive impairment, diverticulitis presenting with abdominal pain. Patient has unable to provide an accurate history at this time and history is obtained over the phone by nursing staff at the [facility]. Patient has had 2 days of abdominal pain and constipation. Patient had bowel movement prior to coming to the hospital. Denies chest pain, fevers, difficulty breathing, nausea, vomiting. Patient states she feels better however still has some lower abdominal pain. Exam reveals left CVA tenderness and left lower quadrant abdominal tenderness. Lungs are clear to auscultation. Patient appears in no acute distress. Will obtain CT scan, labs.

## 2024-05-03 NOTE — ED PROVIDER NOTE - PHYSICAL EXAMINATION
General: Appears well and nontoxic  Mentation: AAO x 3  psych: mood appropriate  HEENT: airway patent, conjunctivae clear bilaterally  Resp: symmetric chest rise, no resp distress, breath sounds CTA bilaterally  Cardio: RRR, no m/r/g  GI: soft/nondistended, LLQ abdominal tenderness  : Left CVA tenderness  Neuro: sensation and motor function grossly intact  Skin: no cyanosis, no jaundice  MSK: normal movement of all extremities

## 2024-05-03 NOTE — ED PROVIDER NOTE - OBJECTIVE STATEMENT
89-year-old female with a past medical history of HLD, glaucoma, macular degeneration, mild cognitive impairment, diverticulitis presenting with abdominal pain. Patient has unable to provide an accurate history at this time and history is obtained over the phone by nursing staff at the [facility]. Patient has had 2 days of abdominal pain and constipation. Patient had bowel movement prior to coming to the hospital. Denies chest pain, fevers, difficulty breathing, nausea, vomiting. Patient states she feels better however still has some lower abdominal pain.

## 2024-05-03 NOTE — ED ADULT NURSE NOTE - OBJECTIVE STATEMENT
Pt is 89y F brought in from [facility] assisted living c/o abd pain, constipation. Pt states she has been having abd pain x a few days. Pt asks repetitive questions, forgetful. Pt states she had bowel movement before transport to hospital. Pt states she is not currently having any pain. Pt is well appearing, A&Ox2(person/place), breathing unlabored and spontaneous, full ROM of all extremities. Pt resting in stretcher, bed in lowest position, aware of plan of care. Comfort and safety measures maintained.

## 2024-05-03 NOTE — ED PROVIDER NOTE - ATTENDING CONTRIBUTION TO CARE
Attending MD Solorio:  I have seen and examined this patient and fully participated in the care of this patient as the teaching attending. I personally made/approved the management plan and take responsibility for the patient management.      89-year-old woman with a history of hyperlipidemia, mild cognitive impairment, history of diverticulitis in the past presenting for evaluation of abdominal pain and constipation.  Patient reports that she had a bowel movement and now is feeling better.  History is a little bit limited however as patient is not a very forthright historian.    Patient's vital signs are nonactionable, she is well-appearing sitting in the stretcher in no apparent distress.  The abdomen is soft nondistended there is mild tenderness to palpation throughout the lower abdomen without rebound or guarding.  Extremities are warm and well-perfused.    Differential diagnosis includes but is not fully limited to, diverticulitis, colitis, appendicitis, SBO, UTI/pyonephritis.  Will obtain screening labs urinalysis CT abdomen pelvis and will reassess      *The above represents an initial assessment/impression. Please refer to progress notes for potential changes in patient clinical course*

## 2024-05-03 NOTE — ED PROVIDER NOTE - PATIENT PORTAL LINK FT
You can access the FollowMyHealth Patient Portal offered by [facility] by registering at the following website: [email or web address]. By joining [facility]’s FollowMyHealth portal, you will also be able to view your health information using other applications (apps) compatible with our system.

## 2024-05-04 VITALS
SYSTOLIC BLOOD PRESSURE: 156 MMHG | RESPIRATION RATE: 18 BRPM | HEART RATE: 77 BPM | OXYGEN SATURATION: 100 % | TEMPERATURE: 99 F | DIASTOLIC BLOOD PRESSURE: 64 MMHG

## 2024-05-04 LAB
CULTURE RESULTS: SIGNIFICANT CHANGE UP
SPECIMEN SOURCE: SIGNIFICANT CHANGE UP

## 2024-05-20 ENCOUNTER — APPOINTMENT (OUTPATIENT)
Dept: INTERNAL MEDICINE | Facility: CLINIC | Age: 89
End: 2024-05-20

## 2024-07-24 NOTE — PHYSICAL THERAPY INITIAL EVALUATION ADULT - LEVEL OF INDEPENDENCE: STAND/SIT, REHAB EVAL
Дмитрий Barnes! Hope you had a great day!  There is a patient will have to add on at 1:30 PM tomorrow. Farrah Get.  is 1967. I believe she's going to call the office to actually get on the schedule. But we can watch. Haven't looked at the chart yet, but they said she had a CT scan that showed inflammation. Thx!    Tue 21:49  AS     independent

## 2024-09-05 ENCOUNTER — INPATIENT (INPATIENT)
Facility: HOSPITAL | Age: 89
LOS: 5 days | Discharge: HOME | DRG: 391 | End: 2024-09-11
Attending: FAMILY MEDICINE | Admitting: FAMILY MEDICINE
Payer: MEDICARE

## 2024-09-05 VITALS
TEMPERATURE: 98 F | SYSTOLIC BLOOD PRESSURE: 147 MMHG | HEIGHT: 60 IN | RESPIRATION RATE: 20 BRPM | HEART RATE: 72 BPM | DIASTOLIC BLOOD PRESSURE: 55 MMHG | WEIGHT: 130.07 LBS | OXYGEN SATURATION: 92 %

## 2024-09-05 LAB
ALBUMIN SERPL ELPH-MCNC: 3.8 G/DL — SIGNIFICANT CHANGE UP (ref 3.3–5)
ALP SERPL-CCNC: 137 U/L — HIGH (ref 40–120)
ALT FLD-CCNC: 12 U/L — SIGNIFICANT CHANGE UP (ref 10–45)
ANION GAP SERPL CALC-SCNC: 9 MMOL/L — SIGNIFICANT CHANGE UP (ref 5–17)
APTT BLD: 26.8 SEC — SIGNIFICANT CHANGE UP (ref 24.5–35.6)
AST SERPL-CCNC: 23 U/L — SIGNIFICANT CHANGE UP (ref 10–40)
BASOPHILS # BLD AUTO: 0.07 K/UL — SIGNIFICANT CHANGE UP (ref 0–0.2)
BASOPHILS NFR BLD AUTO: 0.8 % — SIGNIFICANT CHANGE UP (ref 0–2)
BILIRUB SERPL-MCNC: 0.1 MG/DL — LOW (ref 0.2–1.2)
BUN SERPL-MCNC: 32 MG/DL — HIGH (ref 7–23)
CALCIUM SERPL-MCNC: 11.6 MG/DL — HIGH (ref 8.4–10.5)
CHLORIDE SERPL-SCNC: 112 MMOL/L — HIGH (ref 96–108)
CO2 SERPL-SCNC: 24 MMOL/L — SIGNIFICANT CHANGE UP (ref 22–31)
CREAT SERPL-MCNC: 1.42 MG/DL — HIGH (ref 0.5–1.3)
EGFR: 34 ML/MIN/1.73M2 — LOW
EOSINOPHIL # BLD AUTO: 0.31 K/UL — SIGNIFICANT CHANGE UP (ref 0–0.5)
EOSINOPHIL NFR BLD AUTO: 3.5 % — SIGNIFICANT CHANGE UP (ref 0–6)
GLUCOSE SERPL-MCNC: 108 MG/DL — HIGH (ref 70–99)
HCT VFR BLD CALC: 36.8 % — SIGNIFICANT CHANGE UP (ref 34.5–45)
HGB BLD-MCNC: 11.9 G/DL — SIGNIFICANT CHANGE UP (ref 11.5–15.5)
IMM GRANULOCYTES NFR BLD AUTO: 0.7 % — SIGNIFICANT CHANGE UP (ref 0–0.9)
INR BLD: 0.96 RATIO — SIGNIFICANT CHANGE UP (ref 0.85–1.18)
LYMPHOCYTES # BLD AUTO: 2.93 K/UL — SIGNIFICANT CHANGE UP (ref 1–3.3)
LYMPHOCYTES # BLD AUTO: 33.3 % — SIGNIFICANT CHANGE UP (ref 13–44)
MCHC RBC-ENTMCNC: 31.3 PG — SIGNIFICANT CHANGE UP (ref 27–34)
MCHC RBC-ENTMCNC: 32.3 GM/DL — SIGNIFICANT CHANGE UP (ref 32–36)
MCV RBC AUTO: 96.8 FL — SIGNIFICANT CHANGE UP (ref 80–100)
MONOCYTES # BLD AUTO: 0.85 K/UL — SIGNIFICANT CHANGE UP (ref 0–0.9)
MONOCYTES NFR BLD AUTO: 9.6 % — SIGNIFICANT CHANGE UP (ref 2–14)
NEUTROPHILS # BLD AUTO: 4.59 K/UL — SIGNIFICANT CHANGE UP (ref 1.8–7.4)
NEUTROPHILS NFR BLD AUTO: 52.1 % — SIGNIFICANT CHANGE UP (ref 43–77)
NRBC # BLD: 0 /100 WBCS — SIGNIFICANT CHANGE UP (ref 0–0)
PLATELET # BLD AUTO: 171 K/UL — SIGNIFICANT CHANGE UP (ref 150–400)
POTASSIUM SERPL-MCNC: 4.6 MMOL/L — SIGNIFICANT CHANGE UP (ref 3.5–5.3)
POTASSIUM SERPL-SCNC: 4.6 MMOL/L — SIGNIFICANT CHANGE UP (ref 3.5–5.3)
PROT SERPL-MCNC: 6.9 G/DL — SIGNIFICANT CHANGE UP (ref 6–8.3)
PROTHROM AB SERPL-ACNC: 10.6 SEC — SIGNIFICANT CHANGE UP (ref 9.5–13)
RBC # BLD: 3.8 M/UL — SIGNIFICANT CHANGE UP (ref 3.8–5.2)
RBC # FLD: 13.6 % — SIGNIFICANT CHANGE UP (ref 10.3–14.5)
SODIUM SERPL-SCNC: 145 MMOL/L — SIGNIFICANT CHANGE UP (ref 135–145)
TROPONIN T, HIGH SENSITIVITY RESULT: 25 NG/L — SIGNIFICANT CHANGE UP (ref 0–51)
WBC # BLD: 8.81 K/UL — SIGNIFICANT CHANGE UP (ref 3.8–10.5)
WBC # FLD AUTO: 8.81 K/UL — SIGNIFICANT CHANGE UP (ref 3.8–10.5)

## 2024-09-05 PROCEDURE — 99285 EMERGENCY DEPT VISIT HI MDM: CPT | Mod: GC

## 2024-09-05 PROCEDURE — 70450 CT HEAD/BRAIN W/O DYE: CPT | Mod: 26,MC

## 2024-09-05 RX ORDER — HYDROCORTISONE 10 MG/1
200 TABLET ORAL ONCE
Refills: 0 | Status: COMPLETED | OUTPATIENT
Start: 2024-09-05 | End: 2024-09-05

## 2024-09-05 RX ORDER — DIPHENHYDRAMINE HCL 50 MG
50 CAPSULE ORAL ONCE
Refills: 0 | Status: COMPLETED | OUTPATIENT
Start: 2024-09-05 | End: 2024-09-06

## 2024-09-05 RX ORDER — DIPHENHYDRAMINE HCL 50 MG
50 CAPSULE ORAL ONCE
Refills: 0 | Status: DISCONTINUED | OUTPATIENT
Start: 2024-09-05 | End: 2024-09-05

## 2024-09-05 RX ADMIN — HYDROCORTISONE 200 MILLIGRAM(S): 10 TABLET ORAL at 21:07

## 2024-09-05 NOTE — ED PROVIDER NOTE - ATTENDING CONTRIBUTION TO CARE
PMD Venkat Admits to [facility]  89-year-old female past medical history HLD, glaucoma, macular degeneration, mild cognitive compartment, GERD, diverticulitis/IBS, amaurosis fugax, insomnia, hyperlipidemia, carotid stenosis, depression patient comes to ER from assisted living complaint of difficulty ambulating is a poor historian but believes this onset of symptoms was 6:30 PM this evening.  According to Dr. Padgett patient normally ambulates with a walker.  Patient was unaware she had abdominal pain or tenderness.  Physical exam elderly female awake alert GCS 15  HEENT normocephalic/atraumatic.  Neck supple.  Chest clear A&P.  CV no rubs gallop murmur.  Neuro GCS 15 power 5/5 all extremities, sensation intact, face symmetrical, patient unable to ambulate without assistance despite full strength  Jose Wolf MD, Facep PMD Venkat Admits to [facility]  89-year-old female past medical history HLD, glaucoma, macular degeneration, mild cognitive compartment, GERD, diverticulitis/IBS, amaurosis fugax, insomnia, hyperlipidemia, carotid stenosis, depression patient comes to ER from assisted living complaint of difficulty ambulating is a poor historian but believes this onset of symptoms was 6:30 PM this evening.  According to Dr. Padgett patient normally ambulates with a walker.  Patient was unaware she had abdominal pain or tenderness.  Physical exam elderly female awake alert GCS 15  HEENT normocephalic/atraumatic.  Neck supple.  Chest clear A&P.  CV no rubs gallop murmur.  Neuro GCS 15 power 5/5 all extremities, sensation intact, face symmetrical, patient unable to ambulate without assistance despite full strength.  Abdomen mild right-sided abdominal tenderness no rebound guarding or masses.  Jose Wolf MD, Facep

## 2024-09-05 NOTE — CONSULT NOTE ADULT - SUBJECTIVE AND OBJECTIVE BOX
History:    INCOMPLETE  Chief complaint:  HPI:   RUFINO MEYER is a 89year old right-handed Woman with PMHx Depression, Dementia (Ox1 @Baseline, Ox2 here on Donepezil and namenda), HLD, GERD, Constipation, Diverticulitis/IBS, carotid stenosis presenting from assisted living w/ gait difficulty. Patient poor historian but says sometime between linch and dinner had difficulty walking but feels okay now. Normally walks with walker. Per [facility] pt tried to get up at  6pm but felt dizzy and unable to ambulate. Denies vertigo symptoms, denies  light headed-ness. No headache nausea, vomiting, numbness, weakness. Some abdominal discomfort. ED team examined her noting gait imbalance prompting code stroke  LKN: 1830  NIHSS: 2 (Cannot state exact age, "nearly 89", says month is october)   preMRS: 3   Pt is not a candidate for tenecteplase due to mild, non-disabling deficit, unlikely ischemic stroke  Pt is not a candidate for mechanical thrombectomy due to non-disabling deficit pending CTA       Review of Systems:    CONSTITUTIONAL: No fevers or chills  EYES AND ENT: No visual changes or no throat pain   NECK: No pain or stiffness  RESPIRATORY: No hemoptysis or shortness of breath  CARDIOVASCULAR: No chest pain or palpitations  GASTROINTESTINAL: No melena or hematochezia  GENITOURINARY: No dysuria or hematuria  NEUROLOGICAL: +As stated in HPI above  SKIN: No itching, burning, rashes, or lesions   All other review of systems is negative unless indicated above.    PMHx:  Depression  GERD (gastroesophageal reflux disease)  Hyperlipemia  Melanoma  Basal cell cancer    Social History:  Social History:    Lives with: assisted living  Alcohol use: twice weekly  Tobacco use:  remote hx,many years ago  Other drug use: no   Profession: retired    Medications  Home Medications:  Anusol 1%-12.5% topical ointment (obsolete):  (2021 18:09)  atorvastatin 10 mg oral tablet: 1 tab(s) orally once a day (2021 18:09)  biotin:  (2021 18:09)  donepezil 5 mg oral tablet: 1 tab(s) orally once a day (at bedtime) (2021 18:09)  latanoprost 0.005% ophthalmic solution: 1 drop(s) to each affected eye once a day (in the evening) (2021 18:09)  Melatonin 3 mg oral tablet: 1 tab(s) orally once a day (at bedtime) (2021 18:09)  memantine 28 mg oral capsule, extended release: 1 cap(s) orally once a day (2021 18:09)  MiraLax oral powder for reconstitution:  (2021 18:09)  mirtazapine 45 mg oral tablet, disintegratin tab(s) orally once a day (at bedtime) (2021 18:09)  omeprazole 20 mg oral delayed release capsule: 1 cap(s) orally once a day (2021 18:09)  Senna 8.6 mg oral tablet: 1 tab(s) orally once a day (at bedtime) (2021 18:09)  Vitamin D3:  (2021 18:09)    MEDICATIONS  (STANDING):  diphenhydrAMINE 50 milliGRAM(s) Oral once    MEDICATIONS  (PRN):    Exam:  Vitals:  T(C): 36.7 (24 @ 21:20), Max: 36.9 (24 @ 20:15)  T(F): 98 (24 @ 21:20), Max: 98.4 (24 @ 20:15)  HR: 66 (24 @ 21:20) (66 - 73)  BP: 179/61 (24 @ 21:20) (147/55 - 179/61)  RR: 18 (24 @ 21:20) (18 - 20)  SpO2: 97% (24 @ 21:20) (89% - 98%)  I&O's Summary    Physical and Neurological Examination:   - General: NAD     - Mental Status:   level of consciousness: Awake, alert  Orientation: Oriented to person and place, not age (knows she is nearly 89 and knows her birthday but says would have to do math to know her age), not day/month, knows year. Does not know the current president/candidates says she stopped paying attention duie to her age  Language: Speech is fluent with intact naming, repetition, and ability to follow commands (including simple commands and complex cross commands)  Cortical Signs: No extinction to visual or tactile DSS, no hemineglect.     - Cranial Nerves:   PERRL, VFF OD, questionable monocular LEFT eye superior nasal defect, EOMI, facial sensation is intact in the V1-V3 distribution bilaterally; face is symmetric with normal smile; hearing is intact to finger rub bilaterally and equally; uvula is midline and soft palate rises symmetrically; shoulder shrug intact; tongue protrudes in the midline with intact lateral movements    - Motor Testing:  Bulk: Thin without focal atrophy  Strength:  There is no pronator drift b/l  There is no leg drift b/l                              Right           Left                        5                   5    - Sensory: Intact throughout to light touch.   - Coordination: Finger-nose-finger intact without dysmetria.    - Gait: slow but steady gait with short steps. No imbalance.     Objective Studies  Labs:                          11.9   8.81  )-----------( 171      ( 05 Sep 2024 20:46 )             36.8           145  |  112<H>  |  32<H>  ----------------------------<  108<H>  4.6   |  24  |  1.42<H>    Ca    11.6<H>      05 Sep 2024 20:46    TPro  6.9  /  Alb  3.8  /  TBili  0.1<L>  /  DBili  x   /  AST  23  /  ALT  12  /  AlkPhos  137<H>            Urinalysis Basic - ( 05 Sep 2024 20:46 )    Color: x / Appearance: x / SG: x / pH: x  Gluc: 108 mg/dL / Ketone: x  / Bili: x / Urobili: x   Blood: x / Protein: x / Nitrite: x   Leuk Esterase: x / RBC: x / WBC x   Sq Epi: x / Non Sq Epi: x / Bacteria: x        PT/INR - ( 05 Sep 2024 20:46 )   PT: 10.6 sec;   INR: 0.96 ratio         PTT - ( 05 Sep 2024 20:46 )  PTT:26.8 sec    Lactate Trend            CAPILLARY BLOOD GLUCOSE  99 (05 Sep 2024 21:55)      POCT Blood Glucose.: 99 mg/dL (05 Sep 2024 20:39)          PT/INR - ( 05 Sep 2024 20:46 )   PT: 10.6 sec;   INR: 0.96 ratio         PTT - ( 05 Sep 2024 20:46 )  PTT:26.8 sec    CSF:                CNS Imaging:  CT Head No Cont:  (05 Sep 2024 21:28)    < from: CT Head No Cont (24 @ 21:28) >    ACC: [id number] EXAM:  CT BRAIN   ORDERED BY:  KRYS JOLIEFIDELINA     PROCEDURE DATE:  2024          INTERPRETATION:  CLINICAL INFORMATION: Code stroke, difficulty ambulating    COMPARISON: CT head 2022    CONTRAST:  IV Contrast: NONE  Complications: None reported at time of study completion    TECHNIQUE:  Serial axial images were obtained from the skull base to the   vertex using multi-slice helical technique. Sagittal and coronal   reformats were obtained.    FINDINGS:    VENTRICLES AND SULCI: Age appropriate involutional changes.  INTRA-AXIAL: No acute hemorrhage, vasogenic edema or evidence for acute   territorial infarct.  There are stable periventricular and subcortical   white matter hypodensities, consistent with microvascular type changes.  EXTRA-AXIAL: No mass or fluid collection. Basal cisterns are clear.  VISUALIZED SINUSES:  Foamy secretions left sphenoid sinus. The remaining   clear.  TYMPANOMASTOID CAVITIES:  Clear.  VISUALIZED ORBITS: Bilateral lens replacement.  CALVARIUM: Intact.    MISCELLANEOUS: None.      IMPRESSION:  Stable head CT. No acute intracranial hemorrhage, vasogenic edema,   extra-axial collection or hydrocephalus. Mild chronic microvascular   changes.    Findings were discussed with Dr. Lopez at2024 9:14 PM by Dr. Palomino   with read back confirmation.    --- End of Report ---           AMIE PALOMINO MD; Resident Radiologist  This document has been electronically signed.  PRATIMA WETZEL MD; Attending Radiologist  This document has been electronically signed. Sep  5 2024  9:32PM    < end of copied text >         History:  Chief complaint:  HPI:   RUFINO MEYER is a 89year old right-handed Woman with PMHx Depression, Dementia (Ox1 @Baseline, Ox2 here on Donepezil and namenda), HLD, GERD, Constipation, Diverticulitis/IBS, carotid stenosis presenting from assisted living w/ gait difficulty. Patient poor historian but says sometime between linch and dinner had difficulty walking but feels okay now. Normally walks with walker. [facility] pt tried to get up at  6pm but felt dizzy and unable to ambulate. Denies vertigo symptoms, denies  light headed-ness. No headache nausea, vomiting, numbness, weakness. Some abdominal discomfort. ED team examined her noting gait imbalance prompting code stroke  LKN: 1830  NIHSS: 2 (Cannot state exact age, "nearly 89", says month is october)   preMRS: 3   Pt is not a candidate for tenecteplase due to mild, non-disabling deficit, unlikely ischemic stroke  Pt is not a candidate for mechanical thrombectomy due to non-disabling deficit     Review of Systems:    CONSTITUTIONAL: No fevers or chills  EYES AND ENT: No visual changes or no throat pain   NECK: No pain or stiffness  RESPIRATORY: No hemoptysis or shortness of breath  CARDIOVASCULAR: No chest pain or palpitations  GASTROINTESTINAL: No melena or hematochezia  GENITOURINARY: No dysuria or hematuria  NEUROLOGICAL: +As stated in HPI above  SKIN: No itching, burning, rashes, or lesions   All other review of systems is negative unless indicated above.    PMHx:  Depression  GERD (gastroesophageal reflux disease)  Hyperlipemia  Melanoma  Basal cell cancer    Social History:  Social History:    Lives with: assisted living  Alcohol use: twice weekly  Tobacco use:  remote hx,many years ago  Other drug use: no   Profession: retired    Medications  Home Medications:  Anusol 1%-12.5% topical ointment (obsolete):  (2021 18:09)  atorvastatin 10 mg oral tablet: 1 tab(s) orally once a day (2021 18:09)  biotin:  (2021 18:09)  donepezil 5 mg oral tablet: 1 tab(s) orally once a day (at bedtime) (2021 18:09)  latanoprost 0.005% ophthalmic solution: 1 drop(s) to each affected eye once a day (in the evening) (2021 18:09)  Melatonin 3 mg oral tablet: 1 tab(s) orally once a day (at bedtime) (2021 18:09)  memantine 28 mg oral capsule, extended release: 1 cap(s) orally once a day (2021 18:09)  MiraLax oral powder for reconstitution:  (:09)  mirtazapine 45 mg oral tablet, disintegratin tab(s) orally once a day (at bedtime) (2021 18:09)  omeprazole 20 mg oral delayed release capsule: 1 cap(s) orally once a day (2021 18:09)  Senna 8.6 mg oral tablet: 1 tab(s) orally once a day (at bedtime) (2021 18:09)  Vitamin D3:  (2021 18:09)    MEDICATIONS  (STANDING):  diphenhydrAMINE 50 milliGRAM(s) Oral once    MEDICATIONS  (PRN):    Exam:  Vitals:  T(C): 36.7 (24 @ 21:20), Max: 36.9 (24 @ 20:15)  T(F): 98 (24 @ 21:20), Max: 98.4 (24 @ 20:15)  HR: 66 (24 @ 21:20) (66 - 73)  BP: 179/61 (24 @ 21:20) (147/55 - 179/61)  RR: 18 (24 @ 21:20) (18 - 20)  SpO2: 97% (24 @ 21:20) (89% - 98%)  I&O's Summary    Physical and Neurological Examination:   - General: NAD     - Mental Status:   level of consciousness: Awake, alert  Orientation: Oriented to person and place, not age (knows she is nearly 89 and knows her birthday but says would have to do math to know her age), not day/month, knows year. Does not know the current president/candidates says she stopped paying attention duie to her age  Language: Speech is fluent with intact naming, repetition, and ability to follow commands (including simple commands and complex cross commands)  Cortical Signs: No extinction to visual or tactile DSS, no hemineglect.     - Cranial Nerves:   PERRL, VFF OD, questionable monocular LEFT eye superior nasal defect, EOMI, facial sensation is intact in the V1-V3 distribution bilaterally; face is symmetric with normal smile; hearing is intact to finger rub bilaterally and equally; uvula is midline and soft palate rises symmetrically; shoulder shrug intact; tongue protrudes in the midline with intact lateral movements    - Motor Testing:  Bulk: Thin without focal atrophy  Strength:  There is no pronator drift b/l  There is no leg drift b/l                              Right           Left                        5                   5    - Sensory: Intact throughout to light touch.   - Coordination: Finger-nose-finger intact without dysmetria.    - Gait: slow but steady gait with short steps. No imbalance.     Objective Studies  Labs:                          11.9   8.81  )-----------( 171      ( 05 Sep 2024 20:46 )             36.8           145  |  112<H>  |  32<H>  ----------------------------<  108<H>  4.6   |  24  |  1.42<H>    Ca    11.6<H>      05 Sep 2024 20:46    TPro  6.9  /  Alb  3.8  /  TBili  0.1<L>  /  DBili  x   /  AST  23  /  ALT  12  /  AlkPhos  137<H>            Urinalysis Basic - ( 05 Sep 2024 20:46 )    Color: x / Appearance: x / SG: x / pH: x  Gluc: 108 mg/dL / Ketone: x  / Bili: x / Urobili: x   Blood: x / Protein: x / Nitrite: x   Leuk Esterase: x / RBC: x / WBC x   Sq Epi: x / Non Sq Epi: x / Bacteria: x        PT/INR - ( 05 Sep 2024 20:46 )   PT: 10.6 sec;   INR: 0.96 ratio         PTT - ( 05 Sep 2024 20:46 )  PTT:26.8 sec    Lactate Trend            CAPILLARY BLOOD GLUCOSE  99 (05 Sep 2024 21:55)      POCT Blood Glucose.: 99 mg/dL (05 Sep 2024 20:39)          PT/INR - ( 05 Sep 2024 20:46 )   PT: 10.6 sec;   INR: 0.96 ratio         PTT - ( 05 Sep 2024 20:46 )  PTT:26.8 sec    CSF:                CNS Imaging:  CT Head No Cont:  (05 Sep 2024 21:28)    < from: CT Head No Cont (24 @ 21:28) >    ACC: [id number] EXAM:  CT BRAIN   ORDERED BY:  KRYS SIDHU     PROCEDURE DATE:  2024          INTERPRETATION:  CLINICAL INFORMATION: Code stroke, difficulty ambulating    COMPARISON: CT head 2022    CONTRAST:  IV Contrast: NONE  Complications: None reported at time of study completion    TECHNIQUE:  Serial axial images were obtained from the skull base to the   vertex using multi-slice helical technique. Sagittal and coronal   reformats were obtained.    FINDINGS:    VENTRICLES AND SULCI: Age appropriate involutional changes.  INTRA-AXIAL: No acute hemorrhage, vasogenic edema or evidence for acute   territorial infarct.  There are stable periventricular and subcortical   white matter hypodensities, consistent with microvascular type changes.  EXTRA-AXIAL: No mass or fluid collection. Basal cisterns are clear.  VISUALIZED SINUSES:  Foamy secretions left sphenoid sinus. The remaining   clear.  TYMPANOMASTOID CAVITIES:  Clear.  VISUALIZED ORBITS: Bilateral lens replacement.  CALVARIUM: Intact.    MISCELLANEOUS: None.      IMPRESSION:  Stable head CT. No acute intracranial hemorrhage, vasogenic edema,   extra-axial collection or hydrocephalus. Mild chronic microvascular   changes.    Findings were discussed with Dr. Lopez at2024 9:14 PM by Dr. Palomino   with read back confirmation.    --- End of Report ---           AMIE PALOMINO MD; Resident Radiologist  This document has been electronically signed.  PRATIMA WETZEL MD; Attending Radiologist  This document has been electronically signed. Sep  5 2024  9:32PM    < end of copied text >      < from: CT Angio Head w/ IV Cont (24 @ 02:53) >    ACC: [id number] EXAM:  CT ANGIO NECK (W)AW IC   ORDERED BY:  KRYS SIDHU     ACC: [id number] EXAM:  CT ANGIO BRAIN (W)AW IC   ORDERED BY:  KRYS SIDHU     PROCEDURE DATE:  2024          INTERPRETATION:  CLINICAL INFORMATION: Code stroke, gait difficulty    COMPARISON: CT head 2024    CONTRAST:  IV Contrast: Omnipaque 350 (accession [id number]), IV contrast documented   in unlinked concurrent exam (accession [id number])  90 cc administered  10   cc discarded  Complications: None reported at time of study completion    TECHNIQUE:  CT BRAIN: Serial axial images were obtained from the skull base to the   vertex without the use of contrast.    CTA NECK/HEAD: After the intravenous power injection of non-ionic   contrast material, serialthin sections were obtained through the   cervical and intracranial circulation on a multislice CT scanner. Axial,   Coronal and Sagittal MIP reformatted images were obtained. 3D   reconstruction was also performed.    FINDINGS:    CT BRAIN:  VENTRICLES AND SULCI: Age appropriate involutional changes.  INTRA-AXIAL: No acute hemorrhage, vasogenic edema or signs for acute   territorial infarct.  There are periventricular and subcortical white   matter hypodensities, consistent with microvascular type changes.  EXTRA-AXIAL: No mass or fluid collection. Basal cisterns are clear.  VISUALIZED SINUSES:  Foamy secretions in the left sphenoid sinus.  TYMPANOMASTOID CAVITIES:  Clear.  VISUALIZED ORBITS: Bilateral lens replacement.  CALVARIUM: Intact.    MISCELLANEOUS: None.      CTA NECK:    AORTIC ARCH AND VISUALIZED GREAT VESSELS: Within normal limits.    RIGHT:  COMMON CAROTID ARTERY: No significant stenosis to the carotid   bifurcation. Tortuous course proximally.  INTERNAL CAROTID ARTERY: No significant stenosis based on NASCET criteria.  VERTEBRAL ARTERY: Normal in caliber to the intracranial circulation.   Tortuous course.    LEFT:  COMMON CAROTID ARTERY: No significant stenosis to the carotid   bifurcation. Tortuous course proximally.  INTERNAL CAROTID ARTERY: No significant stenosis based on NASCET   criteria. Luminal irregularity with a subacute appearance suggesting   underlying FMD.  VERTEBRAL ARTERY: Normal in caliber to the intracranial circulation.   Tortuous course.    VISUALIZED LUNGS: Limited evaluation due to respiratory motion.    MISCELLANEOUS: None.    CAROTID STENOSIS REFERENCE: Percent (%) stenosis is expressed in terms of   NASCET Criteria. (NASCET = 100x1-(N/D)). N=greatest narrowing. D=normal   distal diameter- MILD = <50% stenosis. - MODERATE = 50-69% stenosis. -   SEVERE = 70-89% stenosis. - HAIRLINE/CRITICAL = 90-99% stenosis. -   OCCLUDED = 100% stenosis.      CTA HEAD:  INTERNAL CAROTID ARTERIES: Bilateral petrous, precavernous, cavernous,   and supraclinoid regions are patent without significant stenosis.  [place] OF POND: No aneurysm identified. Tiny aneurysms can be beyond   the resolution of CTA technique.  Fetal origin of the right PCA.  ANTERIOR CEREBRAL ARTERIES: No significant stenosis or occlusion.  MIDDLE CEREBRAL ARTERIES: No significant stenosis or occlusion.  POSTERIOR CEREBRAL ARTERIES: No significant stenosis or occlusion.  DISTAL VERTEBRAL / BASILAR ARTERIES: No significant stenosis or occlusion.    VENOUS STRUCTURES: Visualized superficial and deep venous structures   appear patent.  MISCELLANEOUS: No other vascular abnormality is seen.      IMPRESSION:  CT HEAD: No acute intracranial finding. No significant interval change   from most recent head CT.  CTA NECK: No vessel occlusion, hemodynamically significant stenosis or   dissection. 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  CTA HEAD: No large vessel occlusion, significant stenosis or vascular   abnormality identified.        --- End of Report ---           AMIE PALOMINO MD; Resident Radiologist  This document has been electronically signed.  PRATIMA WETZEL MD; Attending Radiologist  This document has been electronically signed. Sep  6 2024  3:49AM    < end of copied text >

## 2024-09-05 NOTE — ED ADULT NURSE NOTE - OBJECTIVE STATEMENT
----- Message from Darian Arango MD sent at 5/7/2024  1:01 PM CDT -----  Please, call and inform patient:   The images we took were normal.   If you have any further questions regarding these results, please contact me.     89 y/o female presents to the ED BIBEMS from the [facility] for BL abdominal pain and unsteady gait. A/Ox2, aware of name and place. Ambulatory without assistive devices at baseline. PMH: Dementia (reportedly A/Ox1 at baseline), hyperlipidemia, GERD, diverticulitis/IBS and carotid stenosis. As per EMS, patient was attempting to get up from the couch when she felt dizzy and unable to ambulate. Patient denies pain, chest pain, nausea, vomiting, cough and chills. Upon assessment, BL lower abdominal tenderness present. Safety and comfort provided. 89 y/o female presents to the ED BIBEMS from the [facility] for BL abdominal pain and unsteady gait. A/Ox2, aware of name and place. Ambulatory without assistive devices at baseline. PMH: Dementia (reportedly A/Ox1 at baseline), hyperlipidemia, GERD, diverticulitis/IBS and carotid stenosis. As per EMS, patient was attempting to get up from the couch when she felt dizzy and unable to ambulate. Last known well is considered to be 18:30.Patient denies pain, chest pain, nausea, vomiting, cough and chills. Upon assessment, BL lower abdominal tenderness present. Safety and comfort provided.

## 2024-09-05 NOTE — ED PROVIDER NOTE - PROGRESS NOTE4
Brief Operative Note    Patient: Christina Zuniga 43 year old female    MRN: [id number]    Surgeon(s): Dulce Maria Carrera MD  Phone Number: [phone]                       Surgeon(s) and Role:     * Dulce Maria Carrera MD - Primary     * Lala Lacey DO - Assisting    Assistant(s): Jac Cole MD PGY IV    Pre-Op Diagnosis: Intramural leiomyoma of uterus [D25.1]  Menorrhagia with regular cycle [N92.0]     Post-Op Diagnosis: Same     Procedure: Procedure(s):  LAPAROSCOPIC TOTAL HYSTERECTOMY  BILATERAL SALPINGECTOMY, LAPAROSCOPIC  OOPHORECTOMY, LAPAROSCOPIC    Anesthesia Type: General                                   Complications: None    Description: see op note for full details    Findings: On SVE, uterus was 15 weeks size, freely mobile w/ fundal fibroid and without adnexal masses. On SSE, normal vaginal mucosa and multiparous cervix. On laparoscopy, normal bilateral fallopian tubes and ovaries. Uterus was enlarged with large posterior fundal fibroid. Vagina lacerations made hemostatic by procedure end. Hemostasis of vaginal cuff at procedure end.     Specimens Removed:   ID Type Source Tests Collected by Time   A : Uterus, cervix, bilateral Fallopian Tubes and Bilateral Ovaries Tissue Uterus w/Bilateral Ovaries and Fallopian Tubes SURGICAL PATHOLOGY Dulce Maria Carrera MD 8/4/2021 0918        Estimated Blood Loss: 300cc    Assistant Tasks: Opening and closing and Dissecting tissue     Implants: * No implants in log *    Jac Cole MD  PGY IV  8/4/2021       Stable.

## 2024-09-05 NOTE — CONSULT NOTE ADULT - ASSESSMENT
Assessment:  89yoRH-W PMHx Depression, Dementia (Ox1 @Baseline, Ox2 here on Donepezil and Namenda) HLD, GERD, Constipation, Diverticulitis/IBS, carotid stenosis, not on daily antiplatelets presenting from assisted living w/ gait difficulty.Per [facility] staff pt tried to get up at  6pm but felt dizzy and unable to ambulate. Denies vertigo/Dizziness here. -179/55-61, HR 60s-70s, RR18-20 satting well on RA. FSBG 99. Exam without focal neurological deficits. Questionable monocular left eye superior nasal defect. Doesnt know age/month. Steady gait. Labs w/ sCR 1.42/BUn 32, Cl112, , iCal elevated 1.56, pCO2 51 w/ bicarb 24 and normal pH 7.35. Normal coags.  NCCTH w/ senescent and mild chronic microvascular changes.   CTA Pending, has allergy (rhinitis) to iodine contrast      Impression: Gait difficulty seems to have improved.     Recommendations:  Plan regarding tenecteplase discussed [facility] stroke fellow, not indicated given now normal gait - nondisabling deficit. Overall wider ddx and not convincingly acute ischemic stroke  Will await CTA for final recommendations     Assessment:  89yoRH-W PMHx Depression, Dementia (Ox1 @Baseline, Ox2 here on Donepezil and Namenda) HLD, GERD, Constipation, Diverticulitis/IBS, carotid stenosis, not on daily antiplatelets presenting from assisted living w/ gait difficulty.Per [facility] staff pt tried to get up at  6pm but felt dizzy and unable to ambulate. Denies vertigo/Dizziness here. -179/55-61, HR 60s-70s, RR18-20 satting well on RA. FSBG 99. Exam without focal neurological deficits. Questionable monocular left eye superior nasal defect. Doesnt know age/month. Steady gait. Labs w/ sCR 1.42/BUN 32, Cl112, , iCal elevated 1.56, pCO2 51 w/ bicarb 24 and normal pH 7.35. Normal coags.  UA w/ trace LE. CTAP w/ left hydronephrosis, Mild wall thickening versus underdistention of the ascending through proximal sigmoid colon possibly c/w colitis. NCCTH w/ senescent and mild chronic microvascular changes. repeat CTH w/ CTA (delayed due to allergy/need for premedication )w/ no changes / no new ischemic findings. CTA w/ left neck ICA Luminal irregularity suggesting FMD. Fetal right PCA/     Impression: Gait difficulty seems to have improved vs fluctuation iso abdominal pain with CTAP c/w colitis. Gait may have been limited by abdominal pain in poor historian    Recommendations:  []PT Eval  []Colitis / rest of care per primary team  []If symptoms recur can get MRI brain noncon    Plan regarding tenecteplase discussed [facility] stroke fellow, not indicated given now normal gait - nondisabling deficit. Overall wider ddx and not convincingly acute ischemic stroke       Assessment:  89yoRH-W PMHx Depression, Dementia (Ox1 @Baseline, Ox2 here on Donepezil and Namenda) HLD, GERD, Constipation, Diverticulitis/IBS, carotid stenosis, not on daily antiplatelets presenting from assisted living w/ gait difficulty.[facility] to get up at  6pm but felt dizzy and unable to ambulate. Denies vertigo/Dizziness here. -179/55-61, HR 60s-70s, RR18-20 satting well on RA. FSBG 99. Exam without focal neurological deficits. Questionable monocular left eye superior nasal defect. Doesnt know age/month. Steady gait. Labs w/ sCR 1.42/BUN 32, Cl112, , iCal elevated 1.56, pCO2 51 w/ bicarb 24 and normal pH 7.35. Normal coags.  UA w/ trace LE. CTAP w/ left hydronephrosis, Mild wall thickening versus underdistention of the ascending through proximal sigmoid colon possibly c/w colitis. NCCTH w/ senescent and mild chronic microvascular changes. repeat CTH w/ CTA (delayed due to allergy/need for premedication )w/ no changes / no new ischemic findings. CTA w/ left neck ICA Luminal irregularity suggesting FMD. Fetal right PCA/     Impression: Gait difficulty seems to have improved vs fluctuation iso abdominal pain with CTAP showing Mild wall thickening versus underdistention of the ascending through proximal sigmoid colon. Gait may have been limited by abdominal pain possibly from colitis in poor historian    Recommendations:  []PT Eval  []Colitis / rest of care per primary team  []If symptoms recur can get MRI brain noncon    Plan regarding tenecteplase discussed [facility] stroke fellow, not indicated given now normal gait - nondisabling deficit. Overall wider ddx and not convincingly acute ischemic stroke

## 2024-09-05 NOTE — ED ADULT NURSE NOTE - NSFALLHARMRISKINTERV_ED_ALL_ED
Assistance OOB with selected safe patient handling equipment if applicable/Assistance with ambulation/Communicate risk of Fall with Harm to all staff, patient, and family/Monitor gait and stability/Provide visual cue: red socks, yellow wristband, yellow gown, etc/Reinforce activity limits and safety measures with patient and family/Bed in lowest position, wheels locked, appropriate side rails in place/Call bell, personal items and telephone in reach/Instruct patient to call for assistance before getting out of bed/chair/stretcher/Non-slip footwear applied when patient is off stretcher/[place] to call system/Physically safe environment - no spills, clutter or unnecessary equipment/Purposeful Proactive Rounding/Room/bathroom lighting operational, light cord in reach

## 2024-09-05 NOTE — ED PROVIDER NOTE - CLINICAL SUMMARY MEDICAL DECISION MAKING FREE TEXT BOX
Patient is a 89-year-old female past medical history dementia (reportedly ANO 1 at baseline) hyperlipidemia, GERD, diverticulitis/IBS, carotid stenosis presents to the ER from [facility] living [facility] for complaint of difficulty walking.  Patient reports she was sitting on the couch around 630 and she tried to get up and felt dizzy and was unable to walk at that time.  [facility] and spoke with [employer] Ure[facility] who states that at 6:00 states she felt dizzy and was unable to ambulate.  She normally ambulates with a walker per Dr. Mike (admits to [facility]). Patient initially stated she had some abdominal pain but then denied it later.   VS non actionable  On exam patient has ataxic gate, not able to ambulate. No other focal neuro deficits, normal FTN testing, strength 5/5 b/l. Patient is a 89-year-old female past medical history dementia (reportedly A+O 1 at baseline) hyperlipidemia, GERD, diverticulitis/IBS, carotid stenosis presents to the ER from [facility] assisted living [facility] for complaint of difficulty walking.  Patient reports she was sitting on the couch around 630 and she tried to get up and felt dizzy and was unable to walk at that time.  I called [facility] and spoke with [employer] Niranjan who states that at 6:00 states she felt dizzy and was unable to ambulate.  She normally ambulates with a walker per Dr. Mike (admits to [facility]). Patient initially stated she had some abdominal pain but then denied it later.   VS non actionable  On exam patient has ataxic gate, not able to ambulate. No other focal neuro deficits, normal FTN testing, strength 5/5 b/l.  DDx- patient newly not ambulatory per daughter and SNF. Will obtain labs/ ct head/ct abdomenpelvis as she is tender, tba medicine.

## 2024-09-05 NOTE — ED ADULT NURSE REASSESSMENT NOTE - NS ED NURSE REASSESS COMMENT FT1
Code stroke initiated by MD Wolf. FS performed at 99. 2 IVs established. Patient brought emergently to CT scan with MD Terry at bedside.

## 2024-09-05 NOTE — ED PROVIDER NOTE - PROGRESS NOTE DETAILS
Matteo Myers DO (PGY-2) Spoke with patient daughter Neda Mcnulty [phone] and states her mothers dementia has been getting worse recently since moving from [place].  But she does normally ambulate on her own. Carlitosrosed to Dr Dulce Wolf MD, Facep Matteo Myers DO (PGY-2) Patient reevaluated at bedside. Patient is doing well. Patient still not ambulatory. Labs non actionable. Pending CTap, CTangiohead results. Matteo Myers, DO (PGY-2) CT abdomen pelvis showing possible colitis.  Patient is a poor historian so difficult to know if she has had any diarrhea but given her abdominal tenderness to palpation will treat.  She also has some mild hydro send UA and culture.  Patient endorsed to Dr. Guerrero.  Called hospitalist to notify the patient is being admitted to write H&P.

## 2024-09-05 NOTE — ED ADULT NURSE NOTE - CHIEF COMPLAINT QUOTE
difficulty walking/unsteady gait starting tonight after dinner   denies any fall/trauma Nsaids Pregnancy And Lactation Text: These medications are considered safe up to 30 weeks gestation. It is excreted in breast milk.

## 2024-09-05 NOTE — ED PROVIDER NOTE - PHYSICAL EXAMINATION
GENERAL: NAD, elderly female  HEENT:  Atraumatic  CHEST/LUNG: Chest rise equal bilaterally cta no w/r/r  HEART: Regular rate and rhythm normal s1/s2 no mrg  ABDOMEN: +Abd TTP worst in LLQ no rebound no guarding no rigidity  EXTREMITIES:  Extremities warm  PSYCH: Oriented to person only  SKIN: No obvious rashes or lesions  MSK: No cervical spine TTP, able to range neck to the left and right/ No midline spinal TTP/ No swelling, redness, pain or discharge from   NEUROLOGY: strength and sensation intact in all extremities. Finger to nose test intact. No pronator drift. +Ataxic gate

## 2024-09-06 DIAGNOSIS — R53.1 WEAKNESS: ICD-10-CM

## 2024-09-06 DIAGNOSIS — N17.9 ACUTE KIDNEY FAILURE, UNSPECIFIED: ICD-10-CM

## 2024-09-06 DIAGNOSIS — K52.9 NONINFECTIVE GASTROENTERITIS AND COLITIS, UNSPECIFIED: ICD-10-CM

## 2024-09-06 DIAGNOSIS — F32.9 MAJOR DEPRESSIVE DISORDER, SINGLE EPISODE, UNSPECIFIED: ICD-10-CM

## 2024-09-06 DIAGNOSIS — F03.90 UNSPECIFIED DEMENTIA, UNSPECIFIED SEVERITY, WITHOUT BEHAVIORAL DISTURBANCE, PSYCHOTIC DISTURBANCE, MOOD DISTURBANCE, AND ANXIETY: ICD-10-CM

## 2024-09-06 LAB
ALBUMIN SERPL ELPH-MCNC: 4.1 G/DL — SIGNIFICANT CHANGE UP (ref 3.3–5)
ALP SERPL-CCNC: 143 U/L — HIGH (ref 40–120)
ALT FLD-CCNC: 11 U/L — SIGNIFICANT CHANGE UP (ref 10–45)
ANION GAP SERPL CALC-SCNC: 10 MMOL/L — SIGNIFICANT CHANGE UP (ref 5–17)
APPEARANCE UR: CLEAR — SIGNIFICANT CHANGE UP
AST SERPL-CCNC: 24 U/L — SIGNIFICANT CHANGE UP (ref 10–40)
BACTERIA # UR AUTO: NEGATIVE /HPF — SIGNIFICANT CHANGE UP
BILIRUB SERPL-MCNC: 0.2 MG/DL — SIGNIFICANT CHANGE UP (ref 0.2–1.2)
BILIRUB UR-MCNC: NEGATIVE — SIGNIFICANT CHANGE UP
BUN SERPL-MCNC: 23 MG/DL — SIGNIFICANT CHANGE UP (ref 7–23)
CALCIUM SERPL-MCNC: 11.8 MG/DL — HIGH (ref 8.4–10.5)
CAST: 0 /LPF — SIGNIFICANT CHANGE UP (ref 0–4)
CHLORIDE SERPL-SCNC: 105 MMOL/L — SIGNIFICANT CHANGE UP (ref 96–108)
CO2 SERPL-SCNC: 24 MMOL/L — SIGNIFICANT CHANGE UP (ref 22–31)
COLOR SPEC: YELLOW — SIGNIFICANT CHANGE UP
CREAT SERPL-MCNC: 1.03 MG/DL — SIGNIFICANT CHANGE UP (ref 0.5–1.3)
CRP SERPL-MCNC: <3 MG/L — SIGNIFICANT CHANGE UP (ref 0–4)
DIFF PNL FLD: NEGATIVE — SIGNIFICANT CHANGE UP
EGFR: 50 ML/MIN/1.73M2 — LOW
ERYTHROCYTE [SEDIMENTATION RATE] IN BLOOD: 6 MM/HR — SIGNIFICANT CHANGE UP (ref 0–20)
FLUAV AG NPH QL: SIGNIFICANT CHANGE UP
FLUBV AG NPH QL: SIGNIFICANT CHANGE UP
GLUCOSE SERPL-MCNC: 112 MG/DL — HIGH (ref 70–99)
GLUCOSE UR QL: NEGATIVE MG/DL — SIGNIFICANT CHANGE UP
KETONES UR-MCNC: NEGATIVE MG/DL — SIGNIFICANT CHANGE UP
LEUKOCYTE ESTERASE UR-ACNC: ABNORMAL
NITRITE UR-MCNC: NEGATIVE — SIGNIFICANT CHANGE UP
PH UR: 7 — SIGNIFICANT CHANGE UP (ref 5–8)
POTASSIUM SERPL-MCNC: 3.8 MMOL/L — SIGNIFICANT CHANGE UP (ref 3.5–5.3)
POTASSIUM SERPL-SCNC: 3.8 MMOL/L — SIGNIFICANT CHANGE UP (ref 3.5–5.3)
PROT SERPL-MCNC: 7 G/DL — SIGNIFICANT CHANGE UP (ref 6–8.3)
PROT UR-MCNC: NEGATIVE MG/DL — SIGNIFICANT CHANGE UP
RBC CASTS # UR COMP ASSIST: 1 /HPF — SIGNIFICANT CHANGE UP (ref 0–4)
RSV RNA NPH QL NAA+NON-PROBE: SIGNIFICANT CHANGE UP
SARS-COV-2 RNA SPEC QL NAA+PROBE: SIGNIFICANT CHANGE UP
SODIUM SERPL-SCNC: 139 MMOL/L — SIGNIFICANT CHANGE UP (ref 135–145)
SP GR SPEC: 1.02 — SIGNIFICANT CHANGE UP (ref 1–1.03)
SQUAMOUS # UR AUTO: 0 /HPF — SIGNIFICANT CHANGE UP (ref 0–5)
TROPONIN T, HIGH SENSITIVITY RESULT: 22 NG/L — SIGNIFICANT CHANGE UP (ref 0–51)
UROBILINOGEN FLD QL: 0.2 MG/DL — SIGNIFICANT CHANGE UP (ref 0.2–1)
WBC UR QL: 3 /HPF — SIGNIFICANT CHANGE UP (ref 0–5)

## 2024-09-06 PROCEDURE — 99223 1ST HOSP IP/OBS HIGH 75: CPT

## 2024-09-06 PROCEDURE — 74177 CT ABD & PELVIS W/CONTRAST: CPT | Mod: 26,MC

## 2024-09-06 PROCEDURE — 71045 X-RAY EXAM CHEST 1 VIEW: CPT | Mod: 26

## 2024-09-06 PROCEDURE — 99222 1ST HOSP IP/OBS MODERATE 55: CPT

## 2024-09-06 PROCEDURE — 70498 CT ANGIOGRAPHY NECK: CPT | Mod: 26,MC

## 2024-09-06 PROCEDURE — 70496 CT ANGIOGRAPHY HEAD: CPT | Mod: 26,MC

## 2024-09-06 RX ORDER — METRONIDAZOLE 250 MG
500 TABLET ORAL ONCE
Refills: 0 | Status: COMPLETED | OUTPATIENT
Start: 2024-09-06 | End: 2024-09-06

## 2024-09-06 RX ORDER — MEMANTINE 7 MG/1
10 CAPSULE, EXTENDED RELEASE ORAL
Refills: 0 | Status: DISCONTINUED | OUTPATIENT
Start: 2024-09-06 | End: 2024-09-11

## 2024-09-06 RX ORDER — SERTRALINE HYDROCHLORIDE 50 MG/1
50 TABLET, FILM COATED ORAL DAILY
Refills: 0 | Status: DISCONTINUED | OUTPATIENT
Start: 2024-09-06 | End: 2024-09-11

## 2024-09-06 RX ORDER — MEMANTINE 7 MG/1
28 CAPSULE, EXTENDED RELEASE ORAL DAILY
Refills: 0 | Status: DISCONTINUED | OUTPATIENT
Start: 2024-09-06 | End: 2024-09-06

## 2024-09-06 RX ORDER — ONDANSETRON 2 MG/ML
4 INJECTION, SOLUTION INTRAMUSCULAR; INTRAVENOUS EVERY 8 HOURS
Refills: 0 | Status: DISCONTINUED | OUTPATIENT
Start: 2024-09-06 | End: 2024-09-06

## 2024-09-06 RX ORDER — DIPHENHYDRAMINE HCL 50 MG
50 CAPSULE ORAL ONCE
Refills: 0 | Status: COMPLETED | OUTPATIENT
Start: 2024-09-06 | End: 2024-09-06

## 2024-09-06 RX ORDER — PANTOPRAZOLE SODIUM 40 MG
40 TABLET, DELAYED RELEASE (ENTERIC COATED) ORAL
Refills: 0 | Status: DISCONTINUED | OUTPATIENT
Start: 2024-09-06 | End: 2024-09-11

## 2024-09-06 RX ORDER — MAGNESIUM, ALUMINUM HYDROXIDE 200-225/5
30 SUSPENSION, ORAL (FINAL DOSE FORM) ORAL EVERY 4 HOURS
Refills: 0 | Status: DISCONTINUED | OUTPATIENT
Start: 2024-09-06 | End: 2024-09-11

## 2024-09-06 RX ORDER — MIRTAZAPINE 30 MG
45 TABLET ORAL AT BEDTIME
Refills: 0 | Status: DISCONTINUED | OUTPATIENT
Start: 2024-09-06 | End: 2024-09-11

## 2024-09-06 RX ORDER — RISPERIDONE 0.25 MG/1
0.25 TABLET, FILM COATED ORAL ONCE
Refills: 0 | Status: COMPLETED | OUTPATIENT
Start: 2024-09-06 | End: 2024-09-06

## 2024-09-06 RX ORDER — LATANOPROST 50 UG/ML
1 SOLUTION OPHTHALMIC AT BEDTIME
Refills: 0 | Status: DISCONTINUED | OUTPATIENT
Start: 2024-09-06 | End: 2024-09-11

## 2024-09-06 RX ORDER — HEPARIN SODIUM,BOVINE 1000/ML
5000 VIAL (ML) INJECTION EVERY 8 HOURS
Refills: 0 | Status: DISCONTINUED | OUTPATIENT
Start: 2024-09-06 | End: 2024-09-11

## 2024-09-06 RX ORDER — ACETAMINOPHEN 325 MG/1
650 TABLET ORAL EVERY 6 HOURS
Refills: 0 | Status: DISCONTINUED | OUTPATIENT
Start: 2024-09-06 | End: 2024-09-11

## 2024-09-06 RX ORDER — SODIUM CHLORIDE 9 MG/ML
1000 INJECTION INTRAMUSCULAR; INTRAVENOUS; SUBCUTANEOUS ONCE
Refills: 0 | Status: COMPLETED | OUTPATIENT
Start: 2024-09-06 | End: 2024-09-06

## 2024-09-06 RX ORDER — METRONIDAZOLE 250 MG
500 TABLET ORAL EVERY 8 HOURS
Refills: 0 | Status: DISCONTINUED | OUTPATIENT
Start: 2024-09-06 | End: 2024-09-07

## 2024-09-06 RX ADMIN — Medication 5000 UNIT(S): at 22:36

## 2024-09-06 RX ADMIN — Medication 200 MILLIGRAM(S): at 05:18

## 2024-09-06 RX ADMIN — Medication 5000 UNIT(S): at 13:59

## 2024-09-06 RX ADMIN — Medication 100 MILLIGRAM(S): at 04:29

## 2024-09-06 RX ADMIN — Medication 50 MILLIGRAM(S): at 01:07

## 2024-09-06 RX ADMIN — LATANOPROST 1 DROP(S): 50 SOLUTION OPHTHALMIC at 23:07

## 2024-09-06 RX ADMIN — Medication 5 MILLIGRAM(S): at 22:36

## 2024-09-06 RX ADMIN — Medication 50 MILLIGRAM(S): at 00:07

## 2024-09-06 RX ADMIN — Medication 100 MILLIGRAM(S): at 13:59

## 2024-09-06 RX ADMIN — Medication 10 MILLIGRAM(S): at 22:36

## 2024-09-06 RX ADMIN — Medication 100 MILLIGRAM(S): at 22:37

## 2024-09-06 RX ADMIN — Medication 45 MILLIGRAM(S): at 22:48

## 2024-09-06 RX ADMIN — SERTRALINE HYDROCHLORIDE 50 MILLIGRAM(S): 50 TABLET, FILM COATED ORAL at 17:39

## 2024-09-06 RX ADMIN — MEMANTINE 10 MILLIGRAM(S): 7 CAPSULE, EXTENDED RELEASE ORAL at 17:39

## 2024-09-06 RX ADMIN — SODIUM CHLORIDE 1000 MILLILITER(S): 9 INJECTION INTRAMUSCULAR; INTRAVENOUS; SUBCUTANEOUS at 01:13

## 2024-09-06 NOTE — H&P ADULT - PROBLEM SELECTOR PLAN 2
likely in the setting of colitis and overall debility  monitor closely  fall risk  PT eval  stroke code called but agreed with neuro, symptoms have resolved and were momentary, will re eval if needbe  CTH, CTA neg for occlusion and stroke

## 2024-09-06 NOTE — H&P ADULT - NSHPPHYSICALEXAM_GEN_ALL_CORE
Vital Signs Last 24 Hrs  T(C): 36.7 (06 Sep 2024 05:45), Max: 36.9 (05 Sep 2024 20:15)  T(F): 98.1 (06 Sep 2024 05:45), Max: 98.4 (05 Sep 2024 20:15)  HR: 81 (06 Sep 2024 05:45) (66 - 81)  BP: 120/84 (06 Sep 2024 05:45) (120/84 - 179/61)  BP(mean): --  RR: 18 (06 Sep 2024 05:45) (18 - 20)  SpO2: 97% (06 Sep 2024 05:45) (89% - 98%)    Parameters below as of 06 Sep 2024 05:45  Patient On (Oxygen Delivery Method): nasal cannula  O2 Flow (L/min): 2      CONSTITUTIONAL: NAD, well-developed, well-groomed  EYES: PERRL; conjunctiva and sclera clear  ENMT: Moist oral mucosa, no pharyngeal injection or exudates; normal dentition  NECK: Supple,   RESPIRATORY: Normal respiratory effort; lungs are clear to auscultation bilaterally  CARDIOVASCULAR: Regular rate and rhythm, normal S1 and S2, no murmur/rub/gallop; No lower extremity edema; Peripheral pulses are 2+ bilaterally  ABDOMEN: mild TTP in RLQ, LLQ normoactive bowel sounds, no rebound/guarding; No hepatosplenomegaly  MUSCULOSKELETAL:; no clubbing or cyanosis of digits; no joint swelling or tenderness to palpation  PSYCH: A+O x 1 to person, states its 2021, and cannot tell me where she is; affect appropriate  NEUROLOGY: CN 2-12 are intact and symmetric; no gross sensory deficits   SKIN: No rashes; no palpable lesions

## 2024-09-06 NOTE — BH CONSULTATION LIAISON ASSESSMENT NOTE - NSBHCHARTREVIEWVS_PSY_A_CORE FT
Vital Signs Last 24 Hrs  T(C): 36.7 (06 Sep 2024 05:45), Max: 36.9 (05 Sep 2024 20:15)  T(F): 98.1 (06 Sep 2024 05:45), Max: 98.4 (05 Sep 2024 20:15)  HR: 81 (06 Sep 2024 05:45) (66 - 81)  BP: 120/84 (06 Sep 2024 05:45) (120/84 - 179/61)  BP(mean): --  RR: 18 (06 Sep 2024 05:45) (18 - 20)  SpO2: 97% (06 Sep 2024 05:45) (89% - 98%)    Parameters below as of 06 Sep 2024 05:45  Patient On (Oxygen Delivery Method): nasal cannula  O2 Flow (L/min): 2

## 2024-09-06 NOTE — H&P ADULT - PROBLEM SELECTOR PLAN 4
No
unclear if AURA v CKD  repeat BMP  trace L hdyronephrosis since done with IV con will obtain renal us to r/o stone, U/A neg  check PVR

## 2024-09-06 NOTE — CONSULT NOTE ADULT - ASSESSMENT
89 yo F with hx dementia GERD, HLD, carotid stenosis, depression p/w weakness and difficulty ambulating. Patient states she was walking when she felt like her legs were going to give out. She felt weak at the time and felt like she may fall so she sat down on a nearby couch. One of the people at the facility told her she should get checked out and called for her to come here. She states she just felt weak and denies chest pain, sob, lightheadness, dizziness when this occurred. It has never happened before. She felt better after she sat down on the couch. Currenltly no complaints and wants to go back home. does not know why she is here. no fevers, chills, palpitations, dysuria, diarrhea, constipation. some abdominal discomfort in lower area.    pt with ?carotid stenosis with ?near syncope  tele  observe HR, bp  check orthostatic  echo  asa 81 mg daily  physical therapy  GOC  dvt prophylaxis  check ua and cultures, doubt colitis

## 2024-09-06 NOTE — H&P ADULT - ASSESSMENT
89 yo F with hx dementia GERD, HLD, carotid stenosis, depression p/w weakness and difficulty ambulating found to have ? AURA, colitis and acute hypoxic respiratory failure

## 2024-09-06 NOTE — H&P ADULT - PROBLEM/PLAN-4
DISPLAY PLAN FREE TEXT EGD BRAVO  Pre-op education provided - all questions answered   Per guideline CBC & BMP sent in PST

## 2024-09-06 NOTE — BH CONSULTATION LIAISON ASSESSMENT NOTE - NS ED BHA REVIEW OF ED CHART AVAILABLE INVESTIGATIONS REVIEWED
Patient Information     Patient Name MRN Sex Jacquelin Choudhary [id number] Female 1986      Nursing Note by Ramila Ba at 10/26/2017  8:15 AM     Author:  Ramila Ba Service:  (none) Author Type:  (none)     Filed:  10/26/2017  8:30 AM Encounter Date:  10/26/2017 Status:  Signed     :  Ramila Ba            This patient presents today for a Preoperative exam for this procedure: Panniculectomy   Date of Surgery: 17   Surgeon:  Dr. Danny Sepulveda  Facility:  [facility]  Fax: [phone]  Suzette Ba LPN ...... 10/26/2017 8:24 AM              
Yes

## 2024-09-06 NOTE — H&P ADULT - PROBLEM SELECTOR PLAN 3
c/w ciprofloxacion, flagyl x 14days  GI follow up in 6-8 weeks if within GOC  no diarrhea so cannot send stool sample

## 2024-09-06 NOTE — H&P ADULT - NSHPLABSRESULTS_GEN_ALL_CORE
11.9   8.81  )-----------( 171      ( 05 Sep 2024 20:46 )             36.8   Comprehensive Metabolic Panel (09.05.24 @ 20:46)    Sodium: 145 mmol/L    Potassium: 4.6 mmol/L    Chloride: 112 mmol/L    Carbon Dioxide: 24 mmol/L    Anion Gap: 9 mmol/L    Blood Urea Nitrogen: 32 mg/dL    Creatinine: 1.42 mg/dL    Glucose: 108 mg/dL    Calcium: 11.6 mg/dL    Protein Total: 6.9 g/dL    Albumin: 3.8 g/dL    Bilirubin Total: 0.1 mg/dL    Alkaline Phosphatase: 137 U/L    Aspartate Aminotransferase (AST/SGOT): 23 U/L    Alanine Aminotransferase (ALT/SGPT): 12 U/L    eGFR: 34: The estimated glomerular filtration rate (eGFR) calculation is based on  the 2021 CKD-EPI creatinine equation, which is validated in male and  female population 18 years of age and older (N Engl J Med 2021;  [id number]). mL/min/1.73m2    < from: CT Abdomen and Pelvis w/ IV Cont (09.06.24 @ 02:53) >    IMPRESSION:  Mild wall thickening versus underdistention of the ascending through   proximal sigmoid colon. Recommendclinical correlation for colitis.    Trace left hydronephrosis without obstructing stone identified. Recommend   correlation with urinalysis.    < end of copied text >    < from: CT Abdomen and Pelvis w/ IV Cont (09.06.24 @ 02:53) >      LOWER CHEST: Revisualized bibasilar fibrotic changes. Coronary artery   calcifications. Small hiatal hernia.    < end of copied text >    < from: CT Angio Head w/ IV Cont (09.06.24 @ 02:53) >    IMPRESSION:  CT HEAD: No acute intracranial finding. No significant interval change   from most recent head CT.  CTA NECK: No vessel occlusion, hemodynamically significant stenosis or   dissection. 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  CTA HEAD: No large vessel occlusion, significant stenosis or vascular   abnormality identified.    < end of copied text >

## 2024-09-06 NOTE — CONSULT NOTE ADULT - SUBJECTIVE AND OBJECTIVE BOX
Date of Service, 09-06-24 @ 12:41  CHIEF COMPLAINT:Patient is a 89y old  Female who presents with a chief complaint of weakness (06 Sep 2024 09:17)      HPI:  89 yo F with hx dementia GERD, HLD, carotid stenosis, depression p/w weakness and difficulty ambulating. Patient states she was walking when she felt like her legs were going to give out. She felt weak at the time and felt like she may fall so she sat down on a nearby couch. One of the people at the facility told her she should get checked out and called for her to come here. She states she just felt weak and denies chest pain, sob, lightheadness, dizziness when this occurred. It has never happened before. She felt better after she sat down on the couch. Currenltly no complaints and wants to go back home. does not know why she is here. no fevers, chills, palpitations, dysuria, diarrhea, constipation. some abdominal discomfort in lower area.        PAST MEDICAL & SURGICAL HISTORY:  Depression  GERD (gastroesophageal reflux disease)  Hyperlipemia  Melanoma  Basal cell cancer  History of cholecystectomy  Hx of bilateral breast reduction surgery  Traumatic amputation of finger  S/P cataract surgery  left eye      MEDICATIONS  (STANDING):  atorvastatin 10 milliGRAM(s) Oral at bedtime  heparin   Injectable 5000 Unit(s) SubCutaneous every 8 hours  latanoprost 0.005% Ophthalmic Solution 1 Drop(s) Both EYES at bedtime  melatonin 5 milliGRAM(s) Oral at bedtime  memantine 10 milliGRAM(s) Oral two times a day  metroNIDAZOLE  IVPB 500 milliGRAM(s) IV Intermittent every 8 hours  mirtazapine Soltab 45 milliGRAM(s) Oral at bedtime  pantoprazole    Tablet 40 milliGRAM(s) Oral before breakfast  sertraline 50 milliGRAM(s) Oral daily    MEDICATIONS  (PRN):  acetaminophen     Tablet .. 650 milliGRAM(s) Oral every 6 hours PRN Temp greater or equal to 38C (100.4F), Mild Pain (1 - 3)  aluminum hydroxide/magnesium hydroxide/simethicone Suspension 30 milliLiter(s) Oral every 4 hours PRN Dyspepsia      FAMILY HISTORY:  FH: breast cancer (Mother)        SOCIAL HISTORY:    [x ] Non-smoker  [ ] Smoker  [ ] Alcohol    Allergies    iodine (Unknown)  Keflex (Other)  CT scan dye (Rhinitis)    Intolerances    	    REVIEW OF SYSTEMS:  CONSTITUTIONAL: No fever, weight loss, or fatigue  EYES: No eye pain, visual disturbances, or discharge  ENT:  No difficulty hearing, tinnitus, vertigo; No sinus or throat pain  NECK: No pain or stiffness  RESPIRATORY: No cough, wheezing, chills or hemoptysis; No Shortness of Breath  CARDIOVASCULAR: No chest pain, palpitations, passing out, dizziness, or leg swelling  GASTROINTESTINAL: No abdominal or epigastric pain. No nausea, vomiting, or hematemesis; No diarrhea or constipation. No melena or hematochezia.  GENITOURINARY: No dysuria, frequency, hematuria, or incontinence  NEUROLOGICAL: No headaches, memory loss, loss of strength, numbness, or tremors  SKIN: No itching, burning, rashes, or lesions   LYMPH Nodes: No enlarged glands  ENDOCRINE: No heat or cold intolerance; No hair loss  MUSCULOSKELETAL: No joint pain or swelling; No muscle, back, or extremity pain  PSYCHIATRIC: No depression, anxiety, mood swings, or difficulty sleeping  HEME/LYMPH: No easy bruising, or bleeding gums  ALLERGY AND IMMUNOLOGIC: No hives or eczema	    [ ] All others negative	  [x ] Unable to obtain    PHYSICAL EXAM:  T(C): 36.7 (09-06-24 @ 05:45), Max: 36.9 (09-05-24 @ 20:15)  HR: 81 (09-06-24 @ 05:45) (66 - 81)  BP: 120/84 (09-06-24 @ 05:45) (120/84 - 179/61)  RR: 18 (09-06-24 @ 05:45) (18 - 20)  SpO2: 97% (09-06-24 @ 05:45) (89% - 98%)  Wt(kg): --  I&O's Summary      Appearance: Normal	  HEENT:   Normal oral mucosa, PERRL, EOMI	  Lymphatic: No lymphadenopathy  Cardiovascular: Normal S1 S2, No JVD, + murmurs, No edema  Respiratory: rhonchi  Psychiatry: dementia  Gastrointestinal:  Soft, Non-tender, + BS	  Skin: No rashes, No ecchymoses, No cyanosis	    Extremities: Normal range of motion, No clubbing, cyanosis or edema  Vascular: Peripheral pulses palpable 2+ bilaterally    TELEMETRY: 	    ECG:  	  RADIOLOGY:  OTHER: 	  	  LABS:	 	    CARDIAC MARKERS:                              11.9   8.81  )-----------( 171      ( 05 Sep 2024 20:46 )             36.8     09-06    139  |  105  |  23  ----------------------------<  112<H>  3.8   |  24  |  1.03    Ca    11.8<H>      06 Sep 2024 10:15    TPro  7.0  /  Alb  4.1  /  TBili  0.2  /  DBili  x   /  AST  24  /  ALT  11  /  AlkPhos  143<H>  09-06    proBNP:   Lipid Profile:   HgA1c:   TSH:   PT/INR - ( 05 Sep 2024 20:46 )   PT: 10.6 sec;   INR: 0.96 ratio         PTT - ( 05 Sep 2024 20:46 )  PTT:26.8 sec    PREVIOUS DIAGNOSTIC TESTING:      < from: 12 Lead ECG (05.03.24 @ 20:00) >  Diagnosis Line NORMAL SINUS RHYTHM  POSSIBLE LEFT ATRIAL ENLARGEMENT  LEFT VENTRICULAR HYPERTROPHY ( R in aVL , [employer] product )  CANNOT RULE OUT SEPTAL INFARCT , AGE UNDETERMINED  ABNORMAL ECG  WHEN COMPARED WITH ECG OF 07-JUN-2021 20:01,  SIGNIFICANT CHANGES HAVE OCCURRED        < from: CT Angio Head w/ IV Cont (09.06.24 @ 02:53) >  CAROTID STENOSIS REFERENCE: Percent (%) stenosis is expressed in terms of   NASCET Criteria. (NASCET = 100x1-(N/D)). N=greatest narrowing. D=normal   distal diameter- MILD = <50% stenosis. - MODERATE = 50-69% stenosis. -   SEVERE = 70-89% stenosis. - HAIRLINE/CRITICAL = 90-99% stenosis. -   OCCLUDED = 100% stenosis.      CTA HEAD:  INTERNAL CAROTID ARTERIES: Bilateral petrous, precavernous, cavernous,   and supraclinoid regions are patent without significant stenosis.  [place] OF POND: No aneurysm identified. Tiny aneurysms can be beyond   the resolution of CTA technique.  Fetal origin of the right PCA.  ANTERIOR CEREBRAL ARTERIES: No significant stenosis or occlusion.  MIDDLE CEREBRAL ARTERIES: No significant stenosis or occlusion.  POSTERIOR CEREBRAL ARTERIES: No significant stenosis or occlusion.  DISTAL VERTEBRAL / BASILAR ARTERIES: No significant stenosis or occlusion.    VENOUS STRUCTURES: Visualized superficial and deep venous structures   appear patent.  MISCELLANEOUS: No other vascular abnormality is seen.      IMPRESSION:  CT HEAD: No acute intracranial finding. No significant interval change   from most recent head CT.  CTA NECK: No vessel occlusion, hemodynamically significant stenosis or   dissection. 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  CTA HEAD: No large vessel occlusion, significant stenosis or vascular   abnormality identified.    Urine Microscopic-Add On (NC) (09.06.24 @ 04:18)    Epithelial Cells: 0 /HPF   Cast: 0 /LPF   Red Blood Cell - Urine: 1 /HPF   White Blood Cell - Urine: 3 /HPF   Bacteria: Negative /HPF      < from: CT Abdomen and Pelvis w/ IV Cont (09.06.24 @ 02:53) >  Mild wall thickening versus underdistention of the ascending through   proximal sigmoid colon. Recommendclinical correlation for colitis.    Trace left hydronephrosis without obstructing stone identified. Recommend   correlation with urinalysis.

## 2024-09-06 NOTE — BH CONSULTATION LIAISON ASSESSMENT NOTE - NSBHCONSULTFOLLOWAFTERCARE_PSY_A_CORE FT
if no psych at [facility] >> [facility] [phone]; [facility] outpt [facility] [phone]

## 2024-09-06 NOTE — PROVIDER CONTACT NOTE (MEDICATION) - ASSESSMENT
Patient refusing CT Chest, U/S Abdomen and all meds at this times has expressed wanting to go home repeatedly and believes she is fine

## 2024-09-06 NOTE — BH CONSULTATION LIAISON ASSESSMENT NOTE - SUMMARY
89yo female,  w/ 2 adult daughters, retired, domiciled at 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 Psychiatry consulted for agitation (pt reportedly wandering around the ER, going to the nurses' station and banging on the counter, yelling.    Unable to get collateral for confirmation however current presentation appears more consistent w/ behavioral disturbance iso known dementia. Would favor non-pharmacologic interventions as much as possible. For severe agitation unresponsive to verbal redirection and/or dangerousness to self/others, can consider Zyprexa 2.5mg po/im q8hr prn (mdd 30mg). No baseline ecg at present d/t pt's uncooperativeness, however per primary team prior qtc was ok. Please monitor ecg if using Zyprexa frequently. Monitor for oversedation.    Thank you for this consult. Will follow with you.  Discussed w/ attending Dr. Bridges.

## 2024-09-06 NOTE — ED ADULT NURSE REASSESSMENT NOTE - NS ED NURSE REASSESS COMMENT FT1
CT scan contacted at #[id number] to be made aware patient received IV benadryl for CT. As per CT, the premedication is a 5 hour protocol and requires IVP 50 benadryl at 1:07 and CT to 2:07. MD Cardona made aware. MD Cardona to order additional dose of benadryl for 1:07. Safety and comfort provided.

## 2024-09-06 NOTE — H&P ADULT - HISTORY OF PRESENT ILLNESS
89 yo F with hx dementia GERD, HLD, carotid stenosis, depression p/w weakness and difficulty ambulating. Patient states she was walking when she felt like her legs were going to give out. She felt weak at the time and felt like she may fall so she sat down on a nearby couch. One of the people at the facility told her she should get checked out and called for her to come here. She states she just felt weak and denies chest pain, sob, lightheadness, dizziness when this occurred. It has never happened before. She felt better after she sat down on the couch. Currenltly no complaints and wants to go back home. does not know why she is here. no fevers, chills, palpitations, dysuria, diarrhea, constipation. some abdominal discomfort in lower area.

## 2024-09-06 NOTE — BH CONSULTATION LIAISON ASSESSMENT NOTE - RISK ASSESSMENT
low chronic and acute risk of harm to self/others given lack of h/o suicidality/homicidality, denial of current MARCIA/I/P, lack of substance hx

## 2024-09-06 NOTE — H&P ADULT - PROBLEM SELECTOR PLAN 1
unclear cause, no sob, lungs clear, no cough  patient states not on 02 at facility  CT showing fibrotic changes lower bases?  will obtain CXR pa/lateral and if needed CT chest  check RVP
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
## 2024-09-06 NOTE — PHYSICAL THERAPY INITIAL EVALUATION ADULT - PERTINENT HX OF CURRENT PROBLEM, REHAB EVAL
Pt is a 89 yo F adm to [facility] on 9/6/24 with hx dementia GERD, HLD, carotid stenosis, depression p/w weakness and difficulty ambulating. Patient states she was walking when she felt like her legs were going to give out. She felt weak at the time and felt like she may fall so she sat down on a nearby couch. One of the people at the facility told her she should get checked out and called for her to come here. She states she just felt weak and denies chest pain, sob, lightheadness, dizziness when this occurred. It has never happened before. She felt better after she sat down on the couch. Currenltly, no complaints and wants to go back home. does not know why she is here. no fevers, chills, palpitations, dysuria, diarrhea, constipation. some abdominal discomfort in lower area.

## 2024-09-06 NOTE — BH CONSULTATION LIAISON ASSESSMENT NOTE - NSBHATTESTCOMMENTATTENDFT_PSY_A_CORE
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 Psychiatry consulted for agitation (pt reportedly wandering around the ER, going to the nurses' station and banging on the counter, yelling.    Pt evaluated at bedside, found sitting calmly and eating lunch. Per chart and pt, she was walking normally at the NH when she felt like her legs were going to suddenly give out, so she made her way to a nearby couch and sat down. Staff at the NH felt pt should be evaluated so she was sent to the ED. This weakness has since resolved and pt has been ambulating around the ED without incident (though notably she's desatting to the 80s). Pt now being given abx for acute colitis and being worked up for AHRF and ?syncopal episode.    Pt however does not agree she needs to be in the hospital. Repeatedly states she's a highly educated woman but she's being "treated like I'm stupid, and I'm not". States she is "outraged" at having her belongings and clothing taken away from her, and at the ED staff "talking about me like I'm a baby... not here". States she did not ask to come to this hospital. rec zyprexa prn, enhanced care

## 2024-09-06 NOTE — PHYSICAL THERAPY INITIAL EVALUATION ADULT - ADDITIONAL COMMENTS
Unable to confirm with pt as pt a poor historian due to dementia and perseverates about being in hospital without her concent and needing to go back home to her hotel. Unable to reach Dtr Fadumo on phone and leave a message. From EMR, pt from assisted living facility at the [facility].

## 2024-09-06 NOTE — BH CONSULTATION LIAISON ASSESSMENT NOTE - NSBHCHARTREVIEWLAB_PSY_A_CORE FT
09-06    139  |  105  |  23  ----------------------------<  112<H>  3.8   |  24  |  1.03    Ca    11.8<H>      06 Sep 2024 10:15    TPro  7.0  /  Alb  4.1  /  TBili  0.2  /  DBili  x   /  AST  24  /  ALT  11  /  AlkPhos  143<H>  09-06                            11.9   8.81  )-----------( 171      ( 05 Sep 2024 20:46 )             36.8     Urinalysis Basic - ( 06 Sep 2024 10:15 )    Color: x / Appearance: x / SG: x / pH: x  Gluc: 112 mg/dL / Ketone: x  / Bili: x / Urobili: x   Blood: x / Protein: x / Nitrite: x   Leuk Esterase: x / RBC: x / WBC x   Sq Epi: x / Non Sq Epi: x / Bacteria: x

## 2024-09-06 NOTE — BH CONSULTATION LIAISON ASSESSMENT NOTE - DESCRIPTION
Pt is , states her ophthalmologist   ~18-19 years ago. Masters in fine arts, was employed as a private .

## 2024-09-07 LAB
CULTURE RESULTS: SIGNIFICANT CHANGE UP
SPECIMEN SOURCE: SIGNIFICANT CHANGE UP

## 2024-09-07 RX ORDER — SODIUM CHLORIDE 9 MG/ML
1000 INJECTION INTRAMUSCULAR; INTRAVENOUS; SUBCUTANEOUS
Refills: 0 | Status: DISCONTINUED | OUTPATIENT
Start: 2024-09-07 | End: 2024-09-11

## 2024-09-07 RX ORDER — METRONIDAZOLE 250 MG
500 TABLET ORAL EVERY 8 HOURS
Refills: 0 | Status: DISCONTINUED | OUTPATIENT
Start: 2024-09-07 | End: 2024-09-11

## 2024-09-07 RX ORDER — OLANZAPINE 7.5 MG/1
2.5 TABLET ORAL EVERY 8 HOURS
Refills: 0 | Status: DISCONTINUED | OUTPATIENT
Start: 2024-09-07 | End: 2024-09-11

## 2024-09-07 RX ADMIN — MEMANTINE 10 MILLIGRAM(S): 7 CAPSULE, EXTENDED RELEASE ORAL at 15:17

## 2024-09-07 RX ADMIN — Medication 500 MILLIGRAM(S): at 14:49

## 2024-09-07 RX ADMIN — Medication 10 MILLIGRAM(S): at 22:11

## 2024-09-07 RX ADMIN — ACETAMINOPHEN 650 MILLIGRAM(S): 325 TABLET ORAL at 22:11

## 2024-09-07 RX ADMIN — Medication 5000 UNIT(S): at 22:12

## 2024-09-07 RX ADMIN — Medication 500 MILLIGRAM(S): at 22:10

## 2024-09-07 RX ADMIN — Medication 45 MILLIGRAM(S): at 22:10

## 2024-09-07 RX ADMIN — LATANOPROST 1 DROP(S): 50 SOLUTION OPHTHALMIC at 22:11

## 2024-09-07 RX ADMIN — SERTRALINE HYDROCHLORIDE 50 MILLIGRAM(S): 50 TABLET, FILM COATED ORAL at 15:16

## 2024-09-07 RX ADMIN — OLANZAPINE 2.5 MILLIGRAM(S): 7.5 TABLET ORAL at 02:03

## 2024-09-07 RX ADMIN — Medication 5 MILLIGRAM(S): at 22:12

## 2024-09-07 RX ADMIN — Medication 5000 UNIT(S): at 15:17

## 2024-09-07 NOTE — DIETITIAN INITIAL EVALUATION ADULT - PERTINENT MEDS FT
MEDICATIONS  (STANDING):  atorvastatin 10 milliGRAM(s) Oral at bedtime  ciprofloxacin   IVPB 400 milliGRAM(s) IV Intermittent every 12 hours  heparin   Injectable 5000 Unit(s) SubCutaneous every 8 hours  latanoprost 0.005% Ophthalmic Solution 1 Drop(s) Both EYES at bedtime  melatonin 5 milliGRAM(s) Oral at bedtime  memantine 10 milliGRAM(s) Oral two times a day  metroNIDAZOLE  IVPB 500 milliGRAM(s) IV Intermittent every 8 hours  mirtazapine Soltab 45 milliGRAM(s) Oral at bedtime  pantoprazole    Tablet 40 milliGRAM(s) Oral before breakfast  sertraline 50 milliGRAM(s) Oral daily  sodium chloride 0.9%. 1000 milliLiter(s) (60 mL/Hr) IV Continuous <Continuous>    MEDICATIONS  (PRN):  acetaminophen     Tablet .. 650 milliGRAM(s) Oral every 6 hours PRN Temp greater or equal to 38C (100.4F), Mild Pain (1 - 3)  aluminum hydroxide/magnesium hydroxide/simethicone Suspension 30 milliLiter(s) Oral every 4 hours PRN Dyspepsia  OLANZapine Injectable 2.5 milliGRAM(s) IntraMuscular every 8 hours PRN Aggitation

## 2024-09-07 NOTE — CHART NOTE - NSCHARTNOTEFT_GEN_A_CORE
Pt refusing to wear tele or take any meds    Tx back to 3T per Dr. South    [id number] Pt refusing to wear tele or take any meds - refusing IV Cipro and Flagyl. Changed to PO in attempts to convince patient to take at least PO meds   Tx back to 3T per Dr. South    [id number]

## 2024-09-07 NOTE — DIETITIAN INITIAL EVALUATION ADULT - REASON INDICATOR FOR ASSESSMENT
Consult: St IV sacrum pressure injury   Consult: St IV sacrum pressure injury  Sources: Electronic Medical Record, RN, Patient - refusing to engage (AAOx1 per RN)   Pt on 4 juliana at time of assessment

## 2024-09-07 NOTE — DIETITIAN INITIAL EVALUATION ADULT - NSICDXPASTMEDICALHX_GEN_ALL_CORE_FT
Pt resting with eyes closed, rise and fall in chest wall. Spouse at bedside   PAST MEDICAL HISTORY:  Basal cell cancer     Depression     GERD (gastroesophageal reflux disease)     Hyperlipemia     Melanoma

## 2024-09-07 NOTE — DIETITIAN INITIAL EVALUATION ADULT - REASON FOR ADMISSION
Noninfectious gastroenteritis     "89 yo F with hx dementia GERD, HLD, carotid stenosis, depression p/w weakness and difficulty ambulating. Patient states she was walking when she felt like her legs were going to give out. She felt weak at the time and felt like she may fall so she sat down on a nearby couch. One of the people at the facility told her she should get checked out and called for her to come here. She states she just felt weak and denies chest pain, sob, lightheadness, dizziness when this occurred. It has never happened before. She felt better after she sat down on the couch. Currenltly no complaints and wants to go back home. does not know why she is here. no fevers, chills, palpitations, dysuria, diarrhea, constipation. some abdominal discomfort in lower area."

## 2024-09-07 NOTE — DIETITIAN INITIAL EVALUATION ADULT - PROBLEM SELECTOR PLAN 4
unclear if AURA v CKD  repeat BMP  trace L hdyronephrosis since done with IV con will obtain renal us to r/o stone, U/A neg  check PVR

## 2024-09-07 NOTE — DIETITIAN INITIAL EVALUATION ADULT - NSFNSPHYEXAMSKINFT_GEN_A_CORE
Per flowsheet:  -None documented   -Patient profile not complete at this time     Per RN:  -Pt refusing to let RN assess skin

## 2024-09-07 NOTE — PROGRESS NOTE ADULT - SUBJECTIVE AND OBJECTIVE BOX
Date of Service: 09-07-24 @ 09:33           CARDIOLOGY     PROGRESS  NOTE   ________________________________________________    CHIEF COMPLAINT:Patient is a 89y old  Female who presents with a chief complaint of weakness (06 Sep 2024 12:41)  no complain  	  REVIEW OF SYSTEMS:  CONSTITUTIONAL: No fever, weight loss, or fatigue  EYES: No eye pain, visual disturbances, or discharge  ENT:  No difficulty hearing, tinnitus, vertigo; No sinus or throat pain  NECK: No pain or stiffness  RESPIRATORY: No cough, wheezing, chills or hemoptysis; No Shortness of Breath  CARDIOVASCULAR: No chest pain, palpitations, passing out, dizziness, or leg swelling  GASTROINTESTINAL: No abdominal or epigastric pain. No nausea, vomiting, or hematemesis; No diarrhea or constipation. No melena or hematochezia.  GENITOURINARY: No dysuria, frequency, hematuria, or incontinence  NEUROLOGICAL: No headaches, memory loss, loss of strength, numbness, or tremors  SKIN: No itching, burning, rashes, or lesions   LYMPH Nodes: No enlarged glands  ENDOCRINE: No heat or cold intolerance; No hair loss  MUSCULOSKELETAL: No joint pain or swelling; No muscle, back, or extremity pain  PSYCHIATRIC: No depression, anxiety, mood swings, or difficulty sleeping  HEME/LYMPH: No easy bruising, or bleeding gums  ALLERGY AND IMMUNOLOGIC: No hives or eczema	    [ x] All others negative	  [ ] Unable to obtain    PHYSICAL EXAM:  T(C): 37.2 (09-06-24 @ 22:17), Max: 37.2 (09-06-24 @ 22:17)  HR: 89 (09-06-24 @ 22:17) (81 - 89)  BP: 176/77 (09-06-24 @ 22:17) (156/66 - 176/77)  RR: 18 (09-06-24 @ 22:17) (18 - 18)  SpO2: 92% (09-06-24 @ 22:17) (92% - 93%)  Wt(kg): --  I&O's Summary      Appearance: Normal	  HEENT:   Normal oral mucosa, PERRL, EOMI	  Lymphatic: No lymphadenopathy  Cardiovascular: Normal S1 S2, No JVD, + murmurs, No edema  Respiratory: rhonchi	  Psychiatry: dementia ?  Gastrointestinal:  Soft, Non-tender, + BS	  Skin: No rashes, No ecchymoses, No cyanosis	  Extremities: Normal range of motion, No clubbing, cyanosis or edema  Vascular: Peripheral pulses palpable 2+ bilaterally    MEDICATIONS  (STANDING):  atorvastatin 10 milliGRAM(s) Oral at bedtime  heparin   Injectable 5000 Unit(s) SubCutaneous every 8 hours  latanoprost 0.005% Ophthalmic Solution 1 Drop(s) Both EYES at bedtime  melatonin 5 milliGRAM(s) Oral at bedtime  memantine 10 milliGRAM(s) Oral two times a day  metroNIDAZOLE  IVPB 500 milliGRAM(s) IV Intermittent every 8 hours  mirtazapine Soltab 45 milliGRAM(s) Oral at bedtime  pantoprazole    Tablet 40 milliGRAM(s) Oral before breakfast  sertraline 50 milliGRAM(s) Oral daily      TELEMETRY: 	    ECG:  	  RADIOLOGY:  OTHER: 	  	  LABS:	 	    CARDIAC MARKERS:                                11.9   8.81  )-----------( 171      ( 05 Sep 2024 20:46 )             36.8     09-06    139  |  105  |  23  ----------------------------<  112<H>  3.8   |  24  |  1.03    Ca    11.8<H>      06 Sep 2024 10:15    TPro  7.0  /  Alb  4.1  /  TBili  0.2  /  DBili  x   /  AST  24  /  ALT  11  /  AlkPhos  143<H>  09-06    proBNP:   Lipid Profile:   HgA1c:   TSH:   PT/INR - ( 05 Sep 2024 20:46 )   PT: 10.6 sec;   INR: 0.96 ratio         PTT - ( 05 Sep 2024 20:46 )  PTT:26.8 sec        < from: CT Abdomen and Pelvis w/ IV Cont (09.06.24 @ 02:53) >  Mild wall thickening versus underdistention of the ascending through   proximal sigmoid colon. Recommendclinical correlation for colitis.    Trace left hydronephrosis without obstructing stone identified. Recommend   correlation with urinalysis.    Urine Microscopic-Add On (NC) (09.06.24 @ 04:18)    Bacteria: Negative /HPF   Epithelial Cells: 0 /HPF   Cast: 0 /LPF   Red Blood Cell - Urine: 1 /HPF   White Blood Cell - Urine: 3 /HPF        Assessment and plan  ---------------------------  89 yo F with hx dementia GERD, HLD, carotid stenosis, depression p/w weakness and difficulty ambulating. Patient states she was walking when she felt like her legs were going to give out. She felt weak at the time and felt like she may fall so she sat down on a nearby couch. One of the people at the facility told her she should get checked out and called for her to come here. She states she just felt weak and denies chest pain, sob, lightheadness, dizziness when this occurred. It has never happened before. She felt better after she sat down on the couch. Currenltly no complaints and wants to go back home. does not know why she is here. no fevers, chills, palpitations, dysuria, diarrhea, constipation. some abdominal discomfort in lower area.    pt with ?carotid stenosis with ?near syncope  tele  observe HR, bp  check orthostatic  echo  asa 81 mg daily  physical therapy  GOC  dvt prophylaxis  check ua and cultures  will order cipro, will consider GI eval

## 2024-09-07 NOTE — DIETITIAN INITIAL EVALUATION ADULT - OTHER INFO
GI/Intake:   -Per RN, good PO intake of meals  -No BM documented thus far; no bowel regimen ordered   -Concern for acute colitis; presenting with abdominal discomfort     Endo:   -No hx of DM noted   -Will d/c diet     Resp:   -Acute hypoxic respiratory failure     Neuro:   -Code stroke upon admission   -Hx of Dementia     Weight Hx:  -Current dosin pounds   -No additional weights noted

## 2024-09-07 NOTE — DIETITIAN INITIAL EVALUATION ADULT - PERTINENT LABORATORY DATA
09-06    139  |  105  |  23  ----------------------------<  112<H>  3.8   |  24  |  1.03    Ca    11.8<H>      06 Sep 2024 10:15    TPro  7.0  /  Alb  4.1  /  TBili  0.2  /  DBili  x   /  AST  24  /  ALT  11  /  AlkPhos  143<H>  09-06

## 2024-09-07 NOTE — PATIENT PROFILE ADULT - FALL HARM RISK - HARM RISK INTERVENTIONS
Communicate Risk of Fall with Harm to all staff/Reinforce activity limits and safety measures with patient and family/Tailored Fall Risk Interventions/Use of alarms - bed, chair and/or voice tab/Visual Cue: Yellow wristband and red socks/Bed in lowest position, wheels locked, appropriate side rails in place/Call bell, personal items and telephone in reach/Instruct patient to call for assistance before getting out of bed or chair/Non-slip footwear when patient is out of bed/[place] to call system/Physically safe environment - no spills, clutter or unnecessary equipment/Purposeful Proactive Rounding/Room/bathroom lighting operational, light cord in reach

## 2024-09-07 NOTE — PROGRESS NOTE ADULT - SUBJECTIVE AND OBJECTIVE BOX
---___---___---___---___---___---___ ---___---___---___---___---___---___---___---___---                  M E D I C A L   A T T E N D I N G   P R O G R E S S   N O T E  ---___---___---___---___---___---___ ---___---___---___---___---___---___---___---___---        ================================================    ++CHIEF COMPLAINT:   Patient is a 89y old  Female who presents with a chief complaint of "89 yo F with hx dementia GERD, HLD, carotid stenosis, depression p/w weakness and difficulty ambulating. Patient states she was walking when she felt like her legs were going to give out. She felt weak at the time and felt like she may fall so she sat down on a nearby couch. One of the people at the facility told her she should get checked out and called for her to come here. She states she just felt weak and denies chest pain, sob, lightheadness, dizziness when this occurred. It has never happened before. She felt better after she sat down on the couch. Currenltly no complaints and wants to go back home. does not know why she is here. no fevers, chills, palpitations, dysuria, diarrhea, constipation. some abdominal discomfort in lower area."   (07 Sep 2024 13:57)      Noninfectious gastroenteritis      ---___---___---___---___---___---  PAST MEDICAL / Surgical  HISTORY:  PAST MEDICAL & SURGICAL HISTORY:  Depression      GERD (gastroesophageal reflux disease)      Hyperlipemia      Melanoma      Basal cell cancer      History of cholecystectomy      Hx of bilateral breast reduction surgery      Traumatic amputation of finger      S/P cataract surgery  left eye          ---___---___---___---___---___---  FAMILY HISTORY:   FAMILY HISTORY:  FH: breast cancer (Mother)          ---___---___---___---___---___---  ALLERGIES:   Allergies    iodine (Unknown)  Keflex (Other)  CT scan dye (Rhinitis)    Intolerances        ---___---___---___---___---___---  MEDICATIONS:  MEDICATIONS  (STANDING):  atorvastatin 10 milliGRAM(s) Oral at bedtime  ciprofloxacin     Tablet 500 milliGRAM(s) Oral every 12 hours  heparin   Injectable 5000 Unit(s) SubCutaneous every 8 hours  latanoprost 0.005% Ophthalmic Solution 1 Drop(s) Both EYES at bedtime  melatonin 5 milliGRAM(s) Oral at bedtime  memantine 10 milliGRAM(s) Oral two times a day  metroNIDAZOLE    Tablet 500 milliGRAM(s) Oral every 8 hours  mirtazapine Soltab 45 milliGRAM(s) Oral at bedtime  pantoprazole    Tablet 40 milliGRAM(s) Oral before breakfast  sertraline 50 milliGRAM(s) Oral daily  sodium chloride 0.9%. 1000 milliLiter(s) (60 mL/Hr) IV Continuous <Continuous>    MEDICATIONS  (PRN):  acetaminophen     Tablet .. 650 milliGRAM(s) Oral every 6 hours PRN Temp greater or equal to 38C (100.4F), Mild Pain (1 - 3)  aluminum hydroxide/magnesium hydroxide/simethicone Suspension 30 milliLiter(s) Oral every 4 hours PRN Dyspepsia  OLANZapine Injectable 2.5 milliGRAM(s) IntraMuscular every 8 hours PRN Aggitation      ---___---___---___---___---___---  REVIEW OF SYSTEM:    GEN: no fever, no chills, no pain  RESP: no SOB, no cough, no sputum  CVS: no chest pain, no palpitations, no edema  GI: no abdominal pain, no nausea, no vomiting, no constipation, no diarrhea  : no dysurea, no frequency, no hematurea  Neuro: no headache, no dizziness  PSYCH: no anxiety, no depression  Derm : no itching, no rash    ---___---___---___---___---___---  VITAL SIGNS:  89y , CAPILLARY BLOOD GLUCOSE        T(C): 37.3 (24 @ 20:34), Max: 37.3 (24 @ 20:34)  HR: 79 (24 @ 20:34) (79 - 89)  BP: 137/75 (24 @ 20:34) (137/75 - 176/77)  RR: 18 (24 @ 20:34) (18 - 18)  SpO2: 92% (24 @ 20:34) (92% - 92%)  ---___---___---___---___---___---  PHYSICAL EXAM:    GEN: A&O X 3 , NAD , comfortable  HEENT: NCAT, PERRL, MMM, hearing intact  Neck: supple , no JVD  CVS: S1S2 , regular , No M/R/G appreciated  PULM: CTA B/L,  no W/R/R appreciated  ABD.: soft. non tender, non distended,  bowel sounds present  Extrem: intact pulses , no edema   Derm: No rash , no ecchymoses  PSYCH : normal mood,  no delusion not anxious     ---___---___---___---___---___---            LAB AND IMAGIN-06    139  |  105  |  23  ----------------------------<  112<H>  3.8   |  24  |  1.03    Ca    11.8<H>      06 Sep 2024 10:15    TPro  7.0  /  Alb  4.1  /  TBili  0.2  /  DBili  x   /  AST  24  /  ALT  11  /  AlkPhos  143<H>                              Urinalysis Basic - ( 06 Sep 2024 10:15 )    Color: x / Appearance: x / SG: x / pH: x  Gluc: 112 mg/dL / Ketone: x  / Bili: x / Urobili: x   Blood: x / Protein: x / Nitrite: x   Leuk Esterase: x / RBC: x / WBC x   Sq Epi: x / Non Sq Epi: x / Bacteria: x        [All pertinent / recent Imaging reviewed]         ---___---___---___---___---___---___ ---___---___---___---___---                         A S S E S S M E N T   A N D   P L A N :      HEALTH ISSUES - PROBLEM Dx:  Acute colitis  on po antibiotics  Weakness  continue meds  AURA (acute kidney injury)  montior cmp   Major depression  con roger meds  Dementia  continue meds       ___________________________  Thank you,  Abraham South  [phone]

## 2024-09-07 NOTE — DIETITIAN INITIAL EVALUATION ADULT - ADD RECOMMEND
1) Liberalize to regular diet   2) Monitor PO intake, diet tolerance, weight trends, labs, GI function, and skin integrity    Gisselle Chairez MS, RDN, CDN ([employer])

## 2024-09-08 LAB
ANION GAP SERPL CALC-SCNC: 10 MMOL/L — SIGNIFICANT CHANGE UP (ref 5–17)
BUN SERPL-MCNC: 24 MG/DL — HIGH (ref 7–23)
CALCIUM SERPL-MCNC: 11.3 MG/DL — HIGH (ref 8.4–10.5)
CHLORIDE SERPL-SCNC: 110 MMOL/L — HIGH (ref 96–108)
CO2 SERPL-SCNC: 21 MMOL/L — LOW (ref 22–31)
CREAT SERPL-MCNC: 1.06 MG/DL — SIGNIFICANT CHANGE UP (ref 0.5–1.3)
EGFR: 48 ML/MIN/1.73M2 — LOW
GLUCOSE SERPL-MCNC: 90 MG/DL — SIGNIFICANT CHANGE UP (ref 70–99)
HCT VFR BLD CALC: 34.7 % — SIGNIFICANT CHANGE UP (ref 34.5–45)
HGB BLD-MCNC: 11.2 G/DL — LOW (ref 11.5–15.5)
MCHC RBC-ENTMCNC: 30.5 PG — SIGNIFICANT CHANGE UP (ref 27–34)
MCHC RBC-ENTMCNC: 32.3 GM/DL — SIGNIFICANT CHANGE UP (ref 32–36)
MCV RBC AUTO: 94.6 FL — SIGNIFICANT CHANGE UP (ref 80–100)
NRBC # BLD: 0 /100 WBCS — SIGNIFICANT CHANGE UP (ref 0–0)
PLATELET # BLD AUTO: 156 K/UL — SIGNIFICANT CHANGE UP (ref 150–400)
POTASSIUM SERPL-MCNC: 4.2 MMOL/L — SIGNIFICANT CHANGE UP (ref 3.5–5.3)
POTASSIUM SERPL-SCNC: 4.2 MMOL/L — SIGNIFICANT CHANGE UP (ref 3.5–5.3)
RBC # BLD: 3.67 M/UL — LOW (ref 3.8–5.2)
RBC # FLD: 14 % — SIGNIFICANT CHANGE UP (ref 10.3–14.5)
SODIUM SERPL-SCNC: 141 MMOL/L — SIGNIFICANT CHANGE UP (ref 135–145)
WBC # BLD: 8.94 K/UL — SIGNIFICANT CHANGE UP (ref 3.8–10.5)
WBC # FLD AUTO: 8.94 K/UL — SIGNIFICANT CHANGE UP (ref 3.8–10.5)

## 2024-09-08 PROCEDURE — 99232 SBSQ HOSP IP/OBS MODERATE 35: CPT

## 2024-09-08 RX ADMIN — Medication 40 MILLIGRAM(S): at 05:46

## 2024-09-08 RX ADMIN — Medication 500 MILLIGRAM(S): at 13:22

## 2024-09-08 RX ADMIN — Medication 500 MILLIGRAM(S): at 22:40

## 2024-09-08 RX ADMIN — MEMANTINE 10 MILLIGRAM(S): 7 CAPSULE, EXTENDED RELEASE ORAL at 05:46

## 2024-09-08 RX ADMIN — Medication 500 MILLIGRAM(S): at 02:08

## 2024-09-08 RX ADMIN — LATANOPROST 1 DROP(S): 50 SOLUTION OPHTHALMIC at 22:39

## 2024-09-08 RX ADMIN — OLANZAPINE 2.5 MILLIGRAM(S): 7.5 TABLET ORAL at 12:25

## 2024-09-08 RX ADMIN — Medication 500 MILLIGRAM(S): at 05:58

## 2024-09-08 RX ADMIN — Medication 45 MILLIGRAM(S): at 22:40

## 2024-09-08 RX ADMIN — Medication 5000 UNIT(S): at 22:39

## 2024-09-08 RX ADMIN — Medication 5000 UNIT(S): at 13:23

## 2024-09-08 RX ADMIN — Medication 5 MILLIGRAM(S): at 22:39

## 2024-09-08 RX ADMIN — Medication 5000 UNIT(S): at 05:46

## 2024-09-08 RX ADMIN — Medication 10 MILLIGRAM(S): at 22:40

## 2024-09-08 NOTE — BH CONSULTATION LIAISON PROGRESS NOTE - NSBHFUPINTERVALHXFT_PSY_A_CORE
Called to see pt after she had received Zyprexa 2.5mg IM for trying to run out of unit, and then was fighting, kicking staff who attempted to bring her back to her room.  Pt feels she is being imprisoned and upset that she is being "drugged."  She is unable to discuss any medical issues at this time and appears slightly paranoid regarding staff.  Discussed adding standing Seroquel with medical team since pt has not yet responded to the one dose of Zyprexa 2.5mg IM.   Pt did gradually calm down 45 min-1hr after she received Zyprexa 2.5mg IM and was eating lunch in bed though slightly tremulous.

## 2024-09-08 NOTE — PROGRESS NOTE ADULT - SUBJECTIVE AND OBJECTIVE BOX
---___---___---___---___---___---___ ---___---___---___---___---___---___---___---___---                  M E D I C A L   A T T E N D I N G   P R O G R E S S   N O T E  ---___---___---___---___---___---___ ---___---___---___---___---___---___---___---___---        ================================================    ++CHIEF COMPLAINT:   Patient is a 89y old  Female who presents with a chief complaint of weakness (08 Sep 2024 10:58)      Noninfectious gastroenteritis      ---___---___---___---___---___---  PAST MEDICAL / Surgical  HISTORY:  PAST MEDICAL & SURGICAL HISTORY:  Depression      GERD (gastroesophageal reflux disease)      Hyperlipemia      Melanoma      Basal cell cancer      History of cholecystectomy      Hx of bilateral breast reduction surgery      Traumatic amputation of finger      S/P cataract surgery  left eye          ---___---___---___---___---___---  FAMILY HISTORY:   FAMILY HISTORY:  FH: breast cancer (Mother)          ---___---___---___---___---___---  ALLERGIES:   Allergies    iodine (Unknown)  Keflex (Other)  CT scan dye (Rhinitis)    Intolerances        ---___---___---___---___---___---  MEDICATIONS:  MEDICATIONS  (STANDING):  atorvastatin 10 milliGRAM(s) Oral at bedtime  ciprofloxacin     Tablet 500 milliGRAM(s) Oral every 12 hours  heparin   Injectable 5000 Unit(s) SubCutaneous every 8 hours  latanoprost 0.005% Ophthalmic Solution 1 Drop(s) Both EYES at bedtime  melatonin 5 milliGRAM(s) Oral at bedtime  memantine 10 milliGRAM(s) Oral two times a day  metroNIDAZOLE    Tablet 500 milliGRAM(s) Oral every 8 hours  mirtazapine Soltab 45 milliGRAM(s) Oral at bedtime  pantoprazole    Tablet 40 milliGRAM(s) Oral before breakfast  QUEtiapine 25 milliGRAM(s) Oral two times a day  QUEtiapine 25 milliGRAM(s) Oral once  sertraline 50 milliGRAM(s) Oral daily  sodium chloride 0.9%. 1000 milliLiter(s) (60 mL/Hr) IV Continuous <Continuous>    MEDICATIONS  (PRN):  acetaminophen     Tablet .. 650 milliGRAM(s) Oral every 6 hours PRN Temp greater or equal to 38C (100.4F), Mild Pain (1 - 3)  aluminum hydroxide/magnesium hydroxide/simethicone Suspension 30 milliLiter(s) Oral every 4 hours PRN Dyspepsia  OLANZapine Injectable 2.5 milliGRAM(s) IntraMuscular every 8 hours PRN Aggitation      ---___---___---___---___---___---  REVIEW OF SYSTEM:    GEN: no fever, no chills, no pain  RESP: no SOB, no cough, no sputum  CVS: no chest pain, no palpitations, no edema  GI: no abdominal pain, no nausea, no vomiting, no constipation, no diarrhea  : no dysurea, no frequency, no hematurea  Neuro: no headache, no dizziness  PSYCH: no anxiety, no depression  Derm : no itching, no rash    ---___---___---___---___---___---  VITAL SIGNS:  89y , CAPILLARY BLOOD GLUCOSE        T(C): 36.9 (24 @ 19:41), Max: 36.9 (24 @ 19:41)  HR: 81 (24 @ 19:41) (80 - 90)  BP: 149/77 (24 @ 19:41) (140/75 - 173/77)  RR: 18 (24 @ 19:41) (18 - 18)  SpO2: 92% (24 @ 19:41) (92% - 92%)  ---___---___---___---___---___---  PHYSICAL EXAM:    GEN: A&O X 3 , NAD , comfortable  HEENT: NCAT, PERRL, MMM, hearing intact  Neck: supple , no JVD  CVS: S1S2 , regular , No M/R/G appreciated  PULM: CTA B/L,  no W/R/R appreciated  ABD.: soft. non tender, non distended,  bowel sounds present  Extrem: intact pulses , no edema   Derm: No rash , no ecchymoses  PSYCH : normal mood,  no delusion not anxious     ---___---___---___---___---___---            LAB AND IMAGIN.2   8.94  )-----------( 156      ( 08 Sep 2024 07:31 )             34.7               09-08    141  |  110<H>  |  24<H>  ----------------------------<  90  4.2   |  21<L>  |  1.06    Ca    11.3<H>      08 Sep 2024 07:31                              Urinalysis Basic - ( 08 Sep 2024 07:31 )    Color: x / Appearance: x / SG: x / pH: x  Gluc: 90 mg/dL / Ketone: x  / Bili: x / Urobili: x   Blood: x / Protein: x / Nitrite: x   Leuk Esterase: x / RBC: x / WBC x   Sq Epi: x / Non Sq Epi: x / Bacteria: x        [All pertinent / recent Imaging reviewed]         ---___---___---___---___---___---___ ---___---___---___---___---                         A S S E S S M E N T   A N D   P L A N :      HEALTH ISSUES - PROBLEM Dx:  Acute colitis  on po antibiotics  Weakness  continue meds  AURA (acute kidney injury)  montior cmp   Major depression  con roger meds  Dementia  continue meds       ___________________________  Thank you,  Abraham [place]  [phone]

## 2024-09-09 PROCEDURE — 99231 SBSQ HOSP IP/OBS SF/LOW 25: CPT

## 2024-09-09 RX ADMIN — Medication 500 MILLIGRAM(S): at 14:09

## 2024-09-09 RX ADMIN — Medication 5000 UNIT(S): at 21:04

## 2024-09-09 RX ADMIN — Medication 25 MILLIGRAM(S): at 06:05

## 2024-09-09 RX ADMIN — Medication 40 MILLIGRAM(S): at 06:05

## 2024-09-09 RX ADMIN — Medication 500 MILLIGRAM(S): at 14:10

## 2024-09-09 RX ADMIN — Medication 5000 UNIT(S): at 06:04

## 2024-09-09 RX ADMIN — SERTRALINE HYDROCHLORIDE 50 MILLIGRAM(S): 50 TABLET, FILM COATED ORAL at 14:10

## 2024-09-09 RX ADMIN — Medication 500 MILLIGRAM(S): at 01:20

## 2024-09-09 RX ADMIN — MEMANTINE 10 MILLIGRAM(S): 7 CAPSULE, EXTENDED RELEASE ORAL at 06:05

## 2024-09-09 RX ADMIN — Medication 500 MILLIGRAM(S): at 21:04

## 2024-09-09 RX ADMIN — Medication 500 MILLIGRAM(S): at 06:05

## 2024-09-09 RX ADMIN — Medication 45 MILLIGRAM(S): at 21:04

## 2024-09-09 RX ADMIN — LATANOPROST 1 DROP(S): 50 SOLUTION OPHTHALMIC at 21:04

## 2024-09-09 RX ADMIN — MEMANTINE 10 MILLIGRAM(S): 7 CAPSULE, EXTENDED RELEASE ORAL at 17:38

## 2024-09-09 RX ADMIN — Medication 25 MILLIGRAM(S): at 17:38

## 2024-09-09 RX ADMIN — Medication 10 MILLIGRAM(S): at 21:03

## 2024-09-09 RX ADMIN — Medication 5 MILLIGRAM(S): at 21:04

## 2024-09-09 RX ADMIN — Medication 5000 UNIT(S): at 14:15

## 2024-09-09 NOTE — SWALLOW BEDSIDE ASSESSMENT ADULT - SWALLOW EVAL: DIAGNOSIS
Consult received and appreciated. Chart reviewed. D/w NP Nehal to clarify if bedside swallow evaluation clinically warranted as pt reported tolerating diet, as per CT A/P no findings of PNA, and given agitation - unlikely candidate for dysphagia workup if warranted. NP to f/u w/ Attending. Consult received and appreciated. Chart reviewed. D/w NP Nehal to clarify if bedside swallow evaluation clinically warranted as pt reported tolerating diet, as per CT A/P no findings of PNA, DNR/DNI, and given agitation - unlikely candidate for dysphagia workup if warranted. NP to f/u w/ Attending.

## 2024-09-09 NOTE — BH CONSULTATION LIAISON PROGRESS NOTE - NSBHFUPINTERVALHXFT_PSY_A_CORE
pt remains confused, poor historian, states she needs to go home. pt currently calm. denies si/hi, psychosis, depression. no prns given. pt slept well

## 2024-09-09 NOTE — PROGRESS NOTE ADULT - SUBJECTIVE AND OBJECTIVE BOX
---___---___---___---___---___---___ ---___---___---___---___---___---___---___---___---                  M E D I C A L   A T T E N D I N G   P R O G R E S S   N O T E  ---___---___---___---___---___---___ ---___---___---___---___---___---___---___---___---        ================================================    ++CHIEF COMPLAINT:   Patient is a 89y old  Female who presents with a chief complaint of weakness (09 Sep 2024 08:48)      Noninfectious gastroenteritis      ---___---___---___---___---___---  PAST MEDICAL / Surgical  HISTORY:  PAST MEDICAL & SURGICAL HISTORY:  Depression      GERD (gastroesophageal reflux disease)      Hyperlipemia      Melanoma      Basal cell cancer      History of cholecystectomy      Hx of bilateral breast reduction surgery      Traumatic amputation of finger      S/P cataract surgery  left eye          ---___---___---___---___---___---  FAMILY HISTORY:   FAMILY HISTORY:  FH: breast cancer (Mother)          ---___---___---___---___---___---  ALLERGIES:   Allergies    iodine (Unknown)  Keflex (Other)  CT scan dye (Rhinitis)    Intolerances        ---___---___---___---___---___---  MEDICATIONS:  MEDICATIONS  (STANDING):  atorvastatin 10 milliGRAM(s) Oral at bedtime  ciprofloxacin     Tablet 500 milliGRAM(s) Oral every 12 hours  heparin   Injectable 5000 Unit(s) SubCutaneous every 8 hours  latanoprost 0.005% Ophthalmic Solution 1 Drop(s) Both EYES at bedtime  melatonin 5 milliGRAM(s) Oral at bedtime  memantine 10 milliGRAM(s) Oral two times a day  metroNIDAZOLE    Tablet 500 milliGRAM(s) Oral every 8 hours  mirtazapine Soltab 45 milliGRAM(s) Oral at bedtime  pantoprazole    Tablet 40 milliGRAM(s) Oral before breakfast  QUEtiapine 25 milliGRAM(s) Oral two times a day  QUEtiapine 25 milliGRAM(s) Oral once  sertraline 50 milliGRAM(s) Oral daily  sodium chloride 0.9%. 1000 milliLiter(s) (60 mL/Hr) IV Continuous <Continuous>    MEDICATIONS  (PRN):  acetaminophen     Tablet .. 650 milliGRAM(s) Oral every 6 hours PRN Temp greater or equal to 38C (100.4F), Mild Pain (1 - 3)  aluminum hydroxide/magnesium hydroxide/simethicone Suspension 30 milliLiter(s) Oral every 4 hours PRN Dyspepsia  OLANZapine Injectable 2.5 milliGRAM(s) IntraMuscular every 8 hours PRN Aggitation      ---___---___---___---___---___---  REVIEW OF SYSTEM:    GEN: no fever, no chills, no pain  RESP: no SOB, no cough, no sputum  CVS: no chest pain, no palpitations, no edema  GI: no abdominal pain, no nausea, no vomiting, no constipation, no diarrhea  : no dysurea, no frequency, no hematurea  Neuro: no headache, no dizziness  PSYCH: no anxiety, no depression  Derm : no itching, no rash    ---___---___---___---___---___---  VITAL SIGNS:  89y , CAPILLARY BLOOD GLUCOSE        T(C): 37 (24 @ 19:56), Max: 37 (24 @ 12:18)  HR: 76 (24 @ 19:56) (71 - 76)  BP: 171/71 (24 @ 19:56) (135/71 - 171/71)  RR: 18 (24 @ 19:56) (18 - 18)  SpO2: 92% (24 @ 19:56) (92% - 93%)  ---___---___---___---___---___---  PHYSICAL EXAM:    GEN: A&O X 3 , NAD , comfortable  HEENT: NCAT, PERRL, MMM, hearing intact  Neck: supple , no JVD  CVS: S1S2 , regular , No M/R/G appreciated  PULM: CTA B/L,  no W/R/R appreciated  ABD.: soft. non tender, non distended,  bowel sounds present  Extrem: intact pulses , no edema   Derm: No rash , no ecchymoses  PSYCH : normal mood,  no delusion not anxious     ---___---___---___---___---___---            LAB AND IMAGIN.2   8.94  )-----------( 156      ( 08 Sep 2024 07:31 )             34.7               09-08    141  |  110<H>  |  24<H>  ----------------------------<  90  4.2   |  21<L>  |  1.06    Ca    11.3<H>      08 Sep 2024 07:31                              Urinalysis Basic - ( 08 Sep 2024 07:31 )    Color: x / Appearance: x / SG: x / pH: x  Gluc: 90 mg/dL / Ketone: x  / Bili: x / Urobili: x   Blood: x / Protein: x / Nitrite: x   Leuk Esterase: x / RBC: x / WBC x   Sq Epi: x / Non Sq Epi: x / Bacteria: x        [All pertinent / recent Imaging reviewed]         ---___---___---___---___---___---___ ---___---___---___---___---                         A S S E S S M E N T   A N D   P L A N :      HEALTH ISSUES - PROBLEM Dx:  Acute colitis  finish abx   Weakness  monitor   AURA (acute kidney injury)  resolved   Major depression  continue  Dementia  continue meds   dc planning for tomorrow    ___________________________  Thank you,  Abraham South  [phone]

## 2024-09-09 NOTE — SWALLOW BEDSIDE ASSESSMENT ADULT - COMMENTS
Neurology; Impression:   1) AMS/baseline dementia AAOx1 r/o toxic metabolic encephalopathy  2) unsteady Gait difficulty seems to have improved vs fluctuation iso abdominal pain with CTAP showing Mild wall thickening versus underdistention of the ascending through proximal sigmoid colon. Gait may have been limited by abdominal pain possibly from colitis in poor historian  Cardiology consult; pt with ?carotid stenosis with ?near syncope  observe HR, bp elevated ?agitation  check orthostatic  echo  chest x ray noted pneumoniae ?aspiration, check speech and swallow  Pysch following for delirium and dementia; zyprexa prn as above  -- multiple provider contacts note d/t refused medication, refusing tele, attempting to escape unit, code greys  9/7; Pt refusing to wear tele or take any meds - refusing IV Cipro and Flagyl. Changed to PO in attempts to convince patient to take at least PO meds  9/8; pt tolerated diet well-no complaints of n/v. rx provided as per emar. oobxsb assist. pt put on their regular clothes and removed gown and tried escaping unit - pt states they do not need care and need to be in the hospital. called a code grey- had to provide pt w IM zyprexa. pt was then put back into bed with help from security and several nurses. pt agitated and refusing meds.     CT A/P IMPRESSION: Mild wall thickening versus underdistention of the ascending through proximal sigmoid colon. Recommend clinical correlation for colitis. Trace left hydronephrosis without obstructing stone identified. Recommend correlation with urinalysis.    SWALLOW HISTORY: No reports in [facility] or in PACS prior to this admission.

## 2024-09-09 NOTE — PROGRESS NOTE ADULT - SUBJECTIVE AND OBJECTIVE BOX
Date of Service: 09-09-24 @ 07:27           CARDIOLOGY     PROGRESS  NOTE   ________________________________________________    CHIEF COMPLAINT:Patient is a 89y old  Female who presents with a chief complaint of weakness (08 Sep 2024 23:15)  no complain  	  REVIEW OF SYSTEMS:  CONSTITUTIONAL: No fever, weight loss, or fatigue  EYES: No eye pain, visual disturbances, or discharge  ENT:  No difficulty hearing, tinnitus, vertigo; No sinus or throat pain  NECK: No pain or stiffness  RESPIRATORY: No cough, wheezing, chills or hemoptysis; No Shortness of Breath  CARDIOVASCULAR: No chest pain, palpitations, passing out, dizziness, or leg swelling  GASTROINTESTINAL: No abdominal or epigastric pain. No nausea, vomiting, or hematemesis; No diarrhea or constipation. No melena or hematochezia.  GENITOURINARY: No dysuria, frequency, hematuria, or incontinence  NEUROLOGICAL: No headaches, memory loss, loss of strength, numbness, or tremors  SKIN: No itching, burning, rashes, or lesions   LYMPH Nodes: No enlarged glands  ENDOCRINE: No heat or cold intolerance; No hair loss  MUSCULOSKELETAL: No joint pain or swelling; No muscle, back, or extremity pain  PSYCHIATRIC: No depression, anxiety, mood swings, or difficulty sleeping  HEME/LYMPH: No easy bruising, or bleeding gums  ALLERGY AND IMMUNOLOGIC: No hives or eczema	    [x ] All others negative	  [ ] Unable to obtain    PHYSICAL EXAM:  T(C): 36.7 (09-09-24 @ 04:53), Max: 36.9 (09-08-24 @ 19:41)  HR: 71 (09-09-24 @ 04:53) (71 - 90)  BP: 135/71 (09-09-24 @ 04:53) (135/71 - 149/77)  RR: 18 (09-09-24 @ 04:53) (18 - 18)  SpO2: 93% (09-09-24 @ 04:53) (92% - 93%)  Wt(kg): --  I&O's Summary      Appearance: Normal	  HEENT:   Normal oral mucosa, PERRL, EOMI	  Lymphatic: No lymphadenopathy  Cardiovascular: Normal S1 S2, No JVD, + murmurs, No edema  Respiratory: rhonchi	  Psychiatry: A & O x 3, Mood & affect appropriate  Gastrointestinal:  Soft, Non-tender, + BS	  Skin: No rashes, No ecchymoses, No cyanosis	  Extremities: Normal range of motion, No clubbing, cyanosis or edema  Vascular: Peripheral pulses palpable 2+ bilaterally    MEDICATIONS  (STANDING):  atorvastatin 10 milliGRAM(s) Oral at bedtime  ciprofloxacin     Tablet 500 milliGRAM(s) Oral every 12 hours  heparin   Injectable 5000 Unit(s) SubCutaneous every 8 hours  latanoprost 0.005% Ophthalmic Solution 1 Drop(s) Both EYES at bedtime  melatonin 5 milliGRAM(s) Oral at bedtime  memantine 10 milliGRAM(s) Oral two times a day  metroNIDAZOLE    Tablet 500 milliGRAM(s) Oral every 8 hours  mirtazapine Soltab 45 milliGRAM(s) Oral at bedtime  pantoprazole    Tablet 40 milliGRAM(s) Oral before breakfast  QUEtiapine 25 milliGRAM(s) Oral two times a day  QUEtiapine 25 milliGRAM(s) Oral once  sertraline 50 milliGRAM(s) Oral daily  sodium chloride 0.9%. 1000 milliLiter(s) (60 mL/Hr) IV Continuous <Continuous>      TELEMETRY: 	    ECG:  	  RADIOLOGY:  OTHER: 	  	  LABS:	 	    CARDIAC MARKERS:                        11.2   8.94  )-----------( 156      ( 08 Sep 2024 07:31 )             34.7     09-08    141  |  110<H>  |  24<H>  ----------------------------<  90  4.2   |  21<L>  |  1.06    Ca    11.3<H>      08 Sep 2024 07:31      proBNP:   Lipid Profile:   HgA1c:   TSH:     Basic Metabolic Panel in AM (09.08.24 @ 07:31)    Calcium: 11.3 mg/dL          Assessment and plan  ---------------------------  89 yo F with hx dementia GERD, HLD, carotid stenosis, depression p/w weakness and difficulty ambulating. Patient states she was walking when she felt like her legs were going to give out. She felt weak at the time and felt like she may fall so she sat down on a nearby couch. One of the people at the facility told her she should get checked out and called for her to come here. She states she just felt weak and denies chest pain, sob, lightheadness, dizziness when this occurred. It has never happened before. She felt better after she sat down on the couch. Currently no complaints and wants to go back home. does not know why she is here. no fevers, chills, palpitations, dysuria, diarrhea, constipation. some abdominal discomfort in lower area.    pt with ?carotid stenosis with ?near syncope  observe HR, bp elevated ?agitation  check orthostatic  echo  asa 81 mg daily  physical therapy  GOC  dvt prophylaxis  check ua and cultures. noted  will order cipro, will consider GI eval  repeat calcium level is improving  chest x ray noted pneumoniae ?aspiration, check speech and swallow  ecg to check qtc interval  will adjust bp meds  \    	         Date of Service: 09-09-24 @ 07:27           CARDIOLOGY     PROGRESS  NOTE   ________________________________________________    CHIEF COMPLAINT:Patient is a 89y old  Female who presents with a chief complaint of weakness (08 Sep 2024 23:15)  no complain  	  REVIEW OF SYSTEMS:  CONSTITUTIONAL: No fever, weight loss, or fatigue  EYES: No eye pain, visual disturbances, or discharge  ENT:  No difficulty hearing, tinnitus, vertigo; No sinus or throat pain  NECK: No pain or stiffness  RESPIRATORY: No cough, wheezing, chills or hemoptysis; No Shortness of Breath  CARDIOVASCULAR: No chest pain, palpitations, passing out, dizziness, or leg swelling  GASTROINTESTINAL: No abdominal or epigastric pain. No nausea, vomiting, or hematemesis; No diarrhea or constipation. No melena or hematochezia.  GENITOURINARY: No dysuria, frequency, hematuria, or incontinence  NEUROLOGICAL: No headaches, memory loss, loss of strength, numbness, or tremors  SKIN: No itching, burning, rashes, or lesions   LYMPH Nodes: No enlarged glands  ENDOCRINE: No heat or cold intolerance; No hair loss  MUSCULOSKELETAL: No joint pain or swelling; No muscle, back, or extremity pain  PSYCHIATRIC: No depression, anxiety, mood swings, or difficulty sleeping  HEME/LYMPH: No easy bruising, or bleeding gums  ALLERGY AND IMMUNOLOGIC: No hives or eczema	    [ ] All others negative	  [x ] Unable to obtain    PHYSICAL EXAM:  T(C): 36.7 (09-09-24 @ 04:53), Max: 36.9 (09-08-24 @ 19:41)  HR: 71 (09-09-24 @ 04:53) (71 - 90)  BP: 135/71 (09-09-24 @ 04:53) (135/71 - 149/77)  RR: 18 (09-09-24 @ 04:53) (18 - 18)  SpO2: 93% (09-09-24 @ 04:53) (92% - 93%)  Wt(kg): --  I&O's Summary      Appearance: Normal	  HEENT:   Normal oral mucosa, PERRL, EOMI	  Lymphatic: No lymphadenopathy  Cardiovascular: Normal S1 S2, No JVD, + murmurs, No edema  Respiratory: rhonchi	  Gastrointestinal:  Soft, Non-tender, + BS	  Skin: No rashes, No ecchymoses, No cyanosis	  Extremities: Normal range of motion, No clubbing, cyanosis or edema  Vascular: Peripheral pulses palpable 2+ bilaterally    MEDICATIONS  (STANDING):  atorvastatin 10 milliGRAM(s) Oral at bedtime  ciprofloxacin     Tablet 500 milliGRAM(s) Oral every 12 hours  heparin   Injectable 5000 Unit(s) SubCutaneous every 8 hours  latanoprost 0.005% Ophthalmic Solution 1 Drop(s) Both EYES at bedtime  melatonin 5 milliGRAM(s) Oral at bedtime  memantine 10 milliGRAM(s) Oral two times a day  metroNIDAZOLE    Tablet 500 milliGRAM(s) Oral every 8 hours  mirtazapine Soltab 45 milliGRAM(s) Oral at bedtime  pantoprazole    Tablet 40 milliGRAM(s) Oral before breakfast  QUEtiapine 25 milliGRAM(s) Oral two times a day  QUEtiapine 25 milliGRAM(s) Oral once  sertraline 50 milliGRAM(s) Oral daily  sodium chloride 0.9%. 1000 milliLiter(s) (60 mL/Hr) IV Continuous <Continuous>      TELEMETRY: 	    ECG:  	  RADIOLOGY:  OTHER: 	  	  LABS:	 	    CARDIAC MARKERS:                        11.2   8.94  )-----------( 156      ( 08 Sep 2024 07:31 )             34.7     09-08    141  |  110<H>  |  24<H>  ----------------------------<  90  4.2   |  21<L>  |  1.06    Ca    11.3<H>      08 Sep 2024 07:31      proBNP:   Lipid Profile:   HgA1c:   TSH:     Basic Metabolic Panel in AM (09.08.24 @ 07:31)    Calcium: 11.3 mg/dL          Assessment and plan  ---------------------------  89 yo F with hx dementia GERD, HLD, carotid stenosis, depression p/w weakness and difficulty ambulating. Patient states she was walking when she felt like her legs were going to give out. She felt weak at the time and felt like she may fall so she sat down on a nearby couch. One of the people at the facility told her she should get checked out and called for her to come here. She states she just felt weak and denies chest pain, sob, lightheadness, dizziness when this occurred. It has never happened before. She felt better after she sat down on the couch. Currently no complaints and wants to go back home. does not know why she is here. no fevers, chills, palpitations, dysuria, diarrhea, constipation. some abdominal discomfort in lower area.    pt with ?carotid stenosis with ?near syncope  observe HR, bp elevated ?agitation  check orthostatic  echo  asa 81 mg daily  physical therapy  GOC  dvt prophylaxis  check ua and cultures. noted  will order cipro, will consider GI eval  repeat calcium level is improving  chest x ray noted pneumoniae ?aspiration, check speech and swallow  ecg to check qtc interval  will adjust bp meds  \

## 2024-09-09 NOTE — BH CONSULTATION LIAISON PROGRESS NOTE - NSBHCHARTREVIEWLAB_PSY_A_CORE FT
11.2   8.94  )-----------( 156      ( 08 Sep 2024 07:31 )             34.7     09-08    141  |  110<H>  |  24<H>  ----------------------------<  90  4.2   |  21<L>  |  1.06    Ca    11.3<H>      08 Sep 2024 07:31

## 2024-09-09 NOTE — SWALLOW BEDSIDE ASSESSMENT ADULT - H & P REVIEW
89yoRH-W with Depression, Dementia (Ox1 @Baseline, Ox2 here) HLD, GERD, Constipation, Diverticulitis/IBS, carotid stenosis, not on daily antiplatelets presenting from assisted living w/ gait difficulty. Per [facility] staff pt tried to get up at  6pm but felt dizzy and unable to ambulate. Denies vertigo/Dizziness here. UA w/ trace LE. CTAP w/ left hydronephrosis, Mild wall thickening versus underdistention of the ascending through proximal sigmoid colon possibly c/w colitis. NCCTH w/ senescent and mild chronic microvascular changes. repeat CTH w/ CTA (delayed due to allergy/need for premedication) w/ no changes / no new ischemic findings./yes

## 2024-09-09 NOTE — PROGRESS NOTE ADULT - SUBJECTIVE AND OBJECTIVE BOX
Neurology Progress Note    S: Patient seen and examined. No new events overnight.    Medication:  acetaminophen     Tablet .. 650 milliGRAM(s) Oral every 6 hours PRN  aluminum hydroxide/magnesium hydroxide/simethicone Suspension 30 milliLiter(s) Oral every 4 hours PRN  atorvastatin 10 milliGRAM(s) Oral at bedtime  ciprofloxacin     Tablet 500 milliGRAM(s) Oral every 12 hours  heparin   Injectable 5000 Unit(s) SubCutaneous every 8 hours  latanoprost 0.005% Ophthalmic Solution 1 Drop(s) Both EYES at bedtime  melatonin 5 milliGRAM(s) Oral at bedtime  memantine 10 milliGRAM(s) Oral two times a day  metroNIDAZOLE    Tablet 500 milliGRAM(s) Oral every 8 hours  mirtazapine Soltab 45 milliGRAM(s) Oral at bedtime  OLANZapine Injectable 2.5 milliGRAM(s) IntraMuscular every 8 hours PRN  pantoprazole    Tablet 40 milliGRAM(s) Oral before breakfast  QUEtiapine 25 milliGRAM(s) Oral once  QUEtiapine 25 milliGRAM(s) Oral two times a day  sertraline 50 milliGRAM(s) Oral daily  sodium chloride 0.9%. 1000 milliLiter(s) IV Continuous <Continuous>      Vitals:  Vital Signs Last 24 Hrs  T(C): 36.7 (09 Sep 2024 04:53), Max: 36.9 (08 Sep 2024 19:41)  T(F): 98 (09 Sep 2024 04:53), Max: 98.5 (08 Sep 2024 19:41)  HR: 71 (09 Sep 2024 04:53) (71 - 90)  BP: 135/71 (09 Sep 2024 04:53) (135/71 - 149/77)  BP(mean): --  RR: 18 (09 Sep 2024 04:53) (18 - 18)  SpO2: 93% (09 Sep 2024 04:53) (92% - 93%)    Parameters below as of 09 Sep 2024 04:53  Patient On (Oxygen Delivery Method): room air        General Exam:   General Appearance: Appropriately dressed and in no acute distress       Head: Normocephalic, atraumatic and no dysmorphic features  Ear, Nose, and Throat: Moist mucous membranes  CVS: S1S2+  Resp: No SOB, no wheeze or rhonchi  Abd: soft NTND  Extremities: No edema, no cyanosis  Skin: No bruises, no rashes     Neurological Exam:    - Mental Status:   level of consciousness: Awake, alert  Orientation: Oriented to person and place, not age (knows she is nearly 89 and knows her birthday but says would have to do math to know her age), not day/month, knows year. Does not know the current president/candidates says she stopped paying attention duie to her age  Language: Speech is fluent with intact naming, repetition, and ability to follow commands (including simple commands and complex cross commands)  Cortical Signs: No extinction to visual or tactile DSS, no hemineglect.     - Cranial Nerves:   PERRL, VFF OD, questionable monocular LEFT eye superior nasal defect, EOMI, facial sensation is intact in the V1-V3 distribution bilaterally; face is symmetric with normal smile; hearing is intact to finger rub bilaterally and equally; uvula is midline and soft palate rises symmetrically; shoulder shrug intact; tongue protrudes in the midline with intact lateral movements    - Motor Testing:  Bulk: Thin without focal atrophy  Strength:  There is no pronator drift b/l  There is no leg drift b/l                              Right           Left                        5                   5    - Sensory: Intact throughout to light touch.   - Coordination: Finger-nose-finger intact without dysmetria.    - Gait: slow but steady gait with short steps. No imbalance.     I personally reviewed the below data/images/labs:      CBC Full  -  ( 08 Sep 2024 07:31 )  WBC Count : 8.94 K/uL  RBC Count : 3.67 M/uL  Hemoglobin : 11.2 g/dL  Hematocrit : 34.7 %  Platelet Count - Automated : 156 K/uL  Mean Cell Volume : 94.6 fl  Mean Cell Hemoglobin : 30.5 pg  Mean Cell Hemoglobin Concentration : 32.3 gm/dL  Auto Neutrophil # : x  Auto Lymphocyte # : x  Auto Monocyte # : x  Auto Eosinophil # : x  Auto Basophil # : x  Auto Neutrophil % : x  Auto Lymphocyte % : x  Auto Monocyte % : x  Auto Eosinophil % : x  Auto Basophil % : x    09-08    141  |  110<H>  |  24<H>  ----------------------------<  90  4.2   |  21<L>  |  1.06    Ca    11.3<H>      08 Sep 2024 07:31          Urinalysis Basic - ( 08 Sep 2024 07:31 )    Color: x / Appearance: x / SG: x / pH: x  Gluc: 90 mg/dL / Ketone: x  / Bili: x / Urobili: x   Blood: x / Protein: x / Nitrite: x   Leuk Esterase: x / RBC: x / WBC x   Sq Epi: x / Non Sq Epi: x / Bacteria: x      CT Head No Cont:  (05 Sep 2024 21:28)    < from: CT Head No Cont (09.05.24 @ 21:28) >    ACC: [id number] EXAM:  CT BRAIN   ORDERED BY:  KRYS SIDHU     PROCEDURE DATE:  09/05/2024          INTERPRETATION:  CLINICAL INFORMATION: Code stroke, difficulty ambulating    COMPARISON: CT head 1/11/2022    CONTRAST:  IV Contrast: NONE  Complications: None reported at time of study completion    TECHNIQUE:  Serial axial images were obtained from the skull base to the   vertex using multi-slice helical technique. Sagittal and coronal   reformats were obtained.    FINDINGS:    VENTRICLES AND SULCI: Age appropriate involutional changes.  INTRA-AXIAL: No acute hemorrhage, vasogenic edema or evidence for acute   territorial infarct.  There are stable periventricular and subcortical   white matter hypodensities, consistent with microvascular type changes.  EXTRA-AXIAL: No mass or fluid collection. Basal cisterns are clear.  VISUALIZED SINUSES:  Foamy secretions left sphenoid sinus. The remaining   clear.  TYMPANOMASTOID CAVITIES:  Clear.  VISUALIZED ORBITS: Bilateral lens replacement.  CALVARIUM: Intact.    MISCELLANEOUS: None.      IMPRESSION:  Stable head CT. No acute intracranial hemorrhage, vasogenic edema,   extra-axial collection or hydrocephalus. Mild chronic microvascular   changes.    Findings were discussed with Dr. Lopez at9/5/2024 9:14 PM by Dr. Palomino   with read back confirmation.    --- End of Report ---           AMIE PALOMINO MD; Resident Radiologist  This document has been electronically signed.  PRATIMA WETZEL MD; Attending Radiologist  This document has been electronically signed. Sep  5 2024  9:32PM    < end of copied text >      < from: CT Angio Head w/ IV Cont (09.06.24 @ 02:53) >    ACC: [id number] EXAM:  CT ANGIO NECK (W)AW IC   ORDERED BY:  KRYS SIDHU     ACC: [id number] EXAM:  CT ANGIO BRAIN (W)AW IC   ORDERED BY:  KRYS SIDHU     PROCEDURE DATE:  09/06/2024          INTERPRETATION:  CLINICAL INFORMATION: Code stroke, gait difficulty    COMPARISON: CT head 9/5/2024    CONTRAST:  IV Contrast: Omnipaque 350 (accession [id number]), IV contrast documented   in unlinked concurrent exam (accession [id number])  90 cc administered  10   cc discarded  Complications: None reported at time of study completion    TECHNIQUE:  CT BRAIN: Serial axial images were obtained from the skull base to the   vertex without the use of contrast.    CTA NECK/HEAD: After the intravenous power injection of non-ionic   contrast material, serialthin sections were obtained through the   cervical and intracranial circulation on a multislice CT scanner. Axial,   Coronal and Sagittal MIP reformatted images were obtained. 3D   reconstruction was also performed.    FINDINGS:    CT BRAIN:  VENTRICLES AND SULCI: Age appropriate involutional changes.  INTRA-AXIAL: No acute hemorrhage, vasogenic edema or signs for acute   territorial infarct.  There are periventricular and subcortical white   matter hypodensities, consistent with microvascular type changes.  EXTRA-AXIAL: No mass or fluid collection. Basal cisterns are clear.  VISUALIZED SINUSES:  Foamy secretions in the left sphenoid sinus.  TYMPANOMASTOID CAVITIES:  Clear.  VISUALIZED ORBITS: Bilateral lens replacement.  CALVARIUM: Intact.    MISCELLANEOUS: None.      CTA NECK:    AORTIC ARCH AND VISUALIZED GREAT VESSELS: Within normal limits.    RIGHT:  COMMON CAROTID ARTERY: No significant stenosis to the carotid   bifurcation. Tortuous course proximally.  INTERNAL CAROTID ARTERY: No significant stenosis based on NASCET criteria.  VERTEBRAL ARTERY: Normal in caliber to the intracranial circulation.   Tortuous course.    LEFT:  COMMON CAROTID ARTERY: No significant stenosis to the carotid   bifurcation. Tortuous course proximally.  INTERNAL CAROTID ARTERY: No significant stenosis based on NASCET   criteria. Luminal irregularity with a subacute appearance suggesting   underlying FMD.  VERTEBRAL ARTERY: Normal in caliber to the intracranial circulation.   Tortuous course.    VISUALIZED LUNGS: Limited evaluation due to respiratory motion.    MISCELLANEOUS: None.    CAROTID STENOSIS REFERENCE: Percent (%) stenosis is expressed in terms of   NASCET Criteria. (NASCET = 100x1-(N/D)). N=greatest narrowing. D=normal   distal diameter- MILD = <50% stenosis. - MODERATE = 50-69% stenosis. -   SEVERE = 70-89% stenosis. - HAIRLINE/CRITICAL = 90-99% stenosis. -   OCCLUDED = 100% stenosis.      CTA HEAD:  INTERNAL CAROTID ARTERIES: Bilateral petrous, precavernous, cavernous,   and supraclinoid regions are patent without significant stenosis.  [place] OF POND: No aneurysm identified. Tiny aneurysms can be beyond   the resolution of CTA technique.  Fetal origin of the right PCA.  ANTERIOR CEREBRAL ARTERIES: No significant stenosis or occlusion.  MIDDLE CEREBRAL ARTERIES: No significant stenosis or occlusion.  POSTERIOR CEREBRAL ARTERIES: No significant stenosis or occlusion.  DISTAL VERTEBRAL / BASILAR ARTERIES: No significant stenosis or occlusion.    VENOUS STRUCTURES: Visualized superficial and deep venous structures   appear patent.  MISCELLANEOUS: No other vascular abnormality is seen.      IMPRESSION:  CT HEAD: No acute intracranial finding. No significant interval change   from most recent head CT.  CTA NECK: No vessel occlusion, hemodynamically significant stenosis or   dissection. 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  CTA HEAD: No large vessel occlusion, significant stenosis or vascular   abnormality identified.        --- End of Report ---

## 2024-09-10 LAB
ANION GAP SERPL CALC-SCNC: 12 MMOL/L — SIGNIFICANT CHANGE UP (ref 5–17)
BUN SERPL-MCNC: 23 MG/DL — SIGNIFICANT CHANGE UP (ref 7–23)
CALCIUM SERPL-MCNC: 11.3 MG/DL — HIGH (ref 8.4–10.5)
CHLORIDE SERPL-SCNC: 107 MMOL/L — SIGNIFICANT CHANGE UP (ref 96–108)
CO2 SERPL-SCNC: 21 MMOL/L — LOW (ref 22–31)
CREAT SERPL-MCNC: 1.14 MG/DL — SIGNIFICANT CHANGE UP (ref 0.5–1.3)
EGFR: 44 ML/MIN/1.73M2 — LOW
GLUCOSE SERPL-MCNC: 107 MG/DL — HIGH (ref 70–99)
HCT VFR BLD CALC: 37.2 % — SIGNIFICANT CHANGE UP (ref 34.5–45)
HGB BLD-MCNC: 12 G/DL — SIGNIFICANT CHANGE UP (ref 11.5–15.5)
MAGNESIUM SERPL-MCNC: 2.2 MG/DL — SIGNIFICANT CHANGE UP (ref 1.6–2.6)
MCHC RBC-ENTMCNC: 30.4 PG — SIGNIFICANT CHANGE UP (ref 27–34)
MCHC RBC-ENTMCNC: 32.3 GM/DL — SIGNIFICANT CHANGE UP (ref 32–36)
MCV RBC AUTO: 94.2 FL — SIGNIFICANT CHANGE UP (ref 80–100)
NRBC # BLD: 0 /100 WBCS — SIGNIFICANT CHANGE UP (ref 0–0)
PHOSPHATE SERPL-MCNC: 2.2 MG/DL — LOW (ref 2.5–4.5)
PLATELET # BLD AUTO: 170 K/UL — SIGNIFICANT CHANGE UP (ref 150–400)
POTASSIUM SERPL-MCNC: 4 MMOL/L — SIGNIFICANT CHANGE UP (ref 3.5–5.3)
POTASSIUM SERPL-SCNC: 4 MMOL/L — SIGNIFICANT CHANGE UP (ref 3.5–5.3)
RBC # BLD: 3.95 M/UL — SIGNIFICANT CHANGE UP (ref 3.8–5.2)
RBC # FLD: 14.1 % — SIGNIFICANT CHANGE UP (ref 10.3–14.5)
SODIUM SERPL-SCNC: 140 MMOL/L — SIGNIFICANT CHANGE UP (ref 135–145)
WBC # BLD: 10.12 K/UL — SIGNIFICANT CHANGE UP (ref 3.8–10.5)
WBC # FLD AUTO: 10.12 K/UL — SIGNIFICANT CHANGE UP (ref 3.8–10.5)

## 2024-09-10 RX ORDER — MEMANTINE 7 MG/1
1 CAPSULE, EXTENDED RELEASE ORAL
Qty: 60 | Refills: 0
Start: 2024-09-10 | End: 2024-10-09

## 2024-09-10 RX ORDER — AMLODIPINE BESYLATE 10 MG/1
5 TABLET ORAL DAILY
Refills: 0 | Status: DISCONTINUED | OUTPATIENT
Start: 2024-09-10 | End: 2024-09-11

## 2024-09-10 RX ORDER — SENNA 187 MG
2 TABLET ORAL AT BEDTIME
Refills: 0 | Status: DISCONTINUED | OUTPATIENT
Start: 2024-09-10 | End: 2024-09-11

## 2024-09-10 RX ORDER — SODIUM PHOSPHATE, DIBASIC AND SODIUM PHOSPHATE, MONOBASIC 7; 19 G/230ML; G/230ML
1 ENEMA RECTAL ONCE
Refills: 0 | Status: COMPLETED | OUTPATIENT
Start: 2024-09-10 | End: 2024-09-10

## 2024-09-10 RX ORDER — POLYETHYLENE GLYCOL 3350 17 G/17G
17 POWDER, FOR SOLUTION ORAL DAILY
Refills: 0 | Status: DISCONTINUED | OUTPATIENT
Start: 2024-09-10 | End: 2024-09-11

## 2024-09-10 RX ORDER — SENNA 187 MG
2 TABLET ORAL
Qty: 60 | Refills: 0
Start: 2024-09-10 | End: 2024-10-09

## 2024-09-10 RX ORDER — SODIUM PHOSPHATE, DIBASIC, ANHYDROUS, POTASSIUM PHOSPHATE, MONOBASIC, AND SODIUM PHOSPHATE, MONOBASIC, MONOHYDRATE 852; 155; 130 MG/1; MG/1; MG/1
2 TABLET, COATED ORAL ONCE
Refills: 0 | Status: COMPLETED | OUTPATIENT
Start: 2024-09-10 | End: 2024-09-10

## 2024-09-10 RX ORDER — ONDANSETRON 2 MG/ML
4 INJECTION, SOLUTION INTRAMUSCULAR; INTRAVENOUS EVERY 8 HOURS
Refills: 0 | Status: DISCONTINUED | OUTPATIENT
Start: 2024-09-10 | End: 2024-09-11

## 2024-09-10 RX ORDER — METRONIDAZOLE 250 MG
1 TABLET ORAL
Qty: 6 | Refills: 0
Start: 2024-09-10 | End: 2024-09-11

## 2024-09-10 RX ORDER — AMLODIPINE BESYLATE 10 MG/1
1 TABLET ORAL
Qty: 30 | Refills: 0
Start: 2024-09-10 | End: 2024-10-09

## 2024-09-10 RX ADMIN — Medication 2 TABLET(S): at 22:04

## 2024-09-10 RX ADMIN — AMLODIPINE BESYLATE 5 MILLIGRAM(S): 10 TABLET ORAL at 12:07

## 2024-09-10 RX ADMIN — Medication 5000 UNIT(S): at 06:12

## 2024-09-10 RX ADMIN — SERTRALINE HYDROCHLORIDE 50 MILLIGRAM(S): 50 TABLET, FILM COATED ORAL at 11:49

## 2024-09-10 RX ADMIN — MEMANTINE 10 MILLIGRAM(S): 7 CAPSULE, EXTENDED RELEASE ORAL at 06:12

## 2024-09-10 RX ADMIN — Medication 5 MILLIGRAM(S): at 22:04

## 2024-09-10 RX ADMIN — LATANOPROST 1 DROP(S): 50 SOLUTION OPHTHALMIC at 22:03

## 2024-09-10 RX ADMIN — Medication 5000 UNIT(S): at 14:53

## 2024-09-10 RX ADMIN — Medication 25 MILLIGRAM(S): at 06:12

## 2024-09-10 RX ADMIN — SODIUM PHOSPHATE, DIBASIC, ANHYDROUS, POTASSIUM PHOSPHATE, MONOBASIC, AND SODIUM PHOSPHATE, MONOBASIC, MONOHYDRATE 2 PACKET(S): 852; 155; 130 TABLET, COATED ORAL at 14:54

## 2024-09-10 RX ADMIN — Medication 10 MILLIGRAM(S): at 13:39

## 2024-09-10 RX ADMIN — Medication 500 MILLIGRAM(S): at 13:41

## 2024-09-10 RX ADMIN — Medication 40 MILLIGRAM(S): at 06:12

## 2024-09-10 RX ADMIN — OLANZAPINE 2.5 MILLIGRAM(S): 7.5 TABLET ORAL at 02:24

## 2024-09-10 RX ADMIN — POLYETHYLENE GLYCOL 3350 17 GRAM(S): 17 POWDER, FOR SOLUTION ORAL at 11:48

## 2024-09-10 RX ADMIN — Medication 500 MILLIGRAM(S): at 01:11

## 2024-09-10 RX ADMIN — Medication 5000 UNIT(S): at 22:03

## 2024-09-10 RX ADMIN — MEMANTINE 10 MILLIGRAM(S): 7 CAPSULE, EXTENDED RELEASE ORAL at 17:14

## 2024-09-10 RX ADMIN — Medication 500 MILLIGRAM(S): at 06:12

## 2024-09-10 RX ADMIN — ONDANSETRON 4 MILLIGRAM(S): 2 INJECTION, SOLUTION INTRAMUSCULAR; INTRAVENOUS at 17:15

## 2024-09-10 RX ADMIN — Medication 45 MILLIGRAM(S): at 22:04

## 2024-09-10 RX ADMIN — Medication 10 MILLIGRAM(S): at 22:04

## 2024-09-10 RX ADMIN — SODIUM PHOSPHATE, DIBASIC AND SODIUM PHOSPHATE, MONOBASIC 1 ENEMA: 7; 19 ENEMA RECTAL at 13:39

## 2024-09-10 RX ADMIN — Medication 500 MILLIGRAM(S): at 22:04

## 2024-09-10 RX ADMIN — OLANZAPINE 2.5 MILLIGRAM(S): 7.5 TABLET ORAL at 20:14

## 2024-09-10 RX ADMIN — Medication 25 MILLIGRAM(S): at 17:14

## 2024-09-10 NOTE — PROGRESS NOTE ADULT - SUBJECTIVE AND OBJECTIVE BOX
---___---___---___---___---___---___ ---___---___---___---___---___---___---___---___---                  M E D I C A L   A T T E N D I N G   P R O G R E S S   N O T E  ---___---___---___---___---___---___ ---___---___---___---___---___---___---___---___---        ================================================    ++CHIEF COMPLAINT:   Patient is a 89y old  Female who presents with a chief complaint of weakness (10 Sep 2024 08:26)      Noninfectious gastroenteritis vomited today       ---___---___---___---___---___---  PAST MEDICAL / Surgical  HISTORY:  PAST MEDICAL & SURGICAL HISTORY:  Depression      GERD (gastroesophageal reflux disease)      Hyperlipemia      Melanoma      Basal cell cancer      History of cholecystectomy      Hx of bilateral breast reduction surgery      Traumatic amputation of finger      S/P cataract surgery  left eye          ---___---___---___---___---___---  FAMILY HISTORY:   FAMILY HISTORY:  FH: breast cancer (Mother)          ---___---___---___---___---___---  ALLERGIES:   Allergies    iodine (Unknown)  Keflex (Other)  CT scan dye (Rhinitis)    Intolerances        ---___---___---___---___---___---  MEDICATIONS:  MEDICATIONS  (STANDING):  amLODIPine   Tablet 5 milliGRAM(s) Oral daily  atorvastatin 10 milliGRAM(s) Oral at bedtime  ciprofloxacin     Tablet 500 milliGRAM(s) Oral every 12 hours  heparin   Injectable 5000 Unit(s) SubCutaneous every 8 hours  latanoprost 0.005% Ophthalmic Solution 1 Drop(s) Both EYES at bedtime  melatonin 5 milliGRAM(s) Oral at bedtime  memantine 10 milliGRAM(s) Oral two times a day  metroNIDAZOLE    Tablet 500 milliGRAM(s) Oral every 8 hours  mirtazapine Soltab 45 milliGRAM(s) Oral at bedtime  pantoprazole    Tablet 40 milliGRAM(s) Oral before breakfast  polyethylene glycol 3350 17 Gram(s) Oral daily  QUEtiapine 25 milliGRAM(s) Oral two times a day  QUEtiapine 25 milliGRAM(s) Oral once  senna 2 Tablet(s) Oral at bedtime  sertraline 50 milliGRAM(s) Oral daily  sodium chloride 0.9%. 1000 milliLiter(s) (60 mL/Hr) IV Continuous <Continuous>    MEDICATIONS  (PRN):  acetaminophen     Tablet .. 650 milliGRAM(s) Oral every 6 hours PRN Temp greater or equal to 38C (100.4F), Mild Pain (1 - 3)  aluminum hydroxide/magnesium hydroxide/simethicone Suspension 30 milliLiter(s) Oral every 4 hours PRN Dyspepsia  OLANZapine Injectable 2.5 milliGRAM(s) IntraMuscular every 8 hours PRN Aggitation  ondansetron   Disintegrating Tablet 4 milliGRAM(s) Oral every 8 hours PRN Nausea and/or Vomiting      ---___---___---___---___---___---  REVIEW OF SYSTEM:    GEN: no fever, no chills, no pain  RESP: no SOB, no cough, no sputum  CVS: no chest pain, no palpitations, no edema  GI: no abdominal pain, no nausea, no vomiting, no constipation, no diarrhea  : no dysurea, no frequency, no hematurea  Neuro: no headache, no dizziness  PSYCH: no anxiety, no depression  Derm : no itching, no rash    ---___---___---___---___---___---  VITAL SIGNS:  89y , CAPILLARY BLOOD GLUCOSE        T(C): 37.1 (09-10-24 @ 19:55), Max: 37.1 (09-10-24 @ 19:55)  HR: 84 (09-10-24 @ 19:55) (77 - 87)  BP: 130/69 (09-10-24 @ 19:55) (130/69 - 176/78)  RR: 18 (09-10-24 @ 19:55) (18 - 20)  SpO2: 93% (09-10-24 @ 19:55) (92% - 94%)  ---___---___---___---___---___---  PHYSICAL EXAM:    GEN: A&O X2 , NAD , comfortable  HEENT: NCAT, PERRL, MMM, hearing intact  Neck: supple , no JVD  CVS: S1S2 , regular , No M/R/G appreciated  PULM: CTA B/L,  no W/R/R appreciated  ABD.: soft. non tender, non distended,  bowel sounds present  Extrem: intact pulses , no edema   Derm: No rash , no ecchymoses  PSYCH : normal mood,  no delusion not anxious     ---___---___---___---___---___---            LAB AND IMAGIN.0   10.12 )-----------( 170      ( 10 Sep 2024 06:59 )             37.2               09-10    140  |  107  |  23  ----------------------------<  107<H>  4.0   |  21<L>  |  1.14    Ca    11.3<H>      10 Sep 2024 06:59  Phos  2.2     09-10  Mg     2.2     09-10                              Urinalysis Basic - ( 10 Sep 2024 06:59 )    Color: x / Appearance: x / SG: x / pH: x  Gluc: 107 mg/dL / Ketone: x  / Bili: x / Urobili: x   Blood: x / Protein: x / Nitrite: x   Leuk Esterase: x / RBC: x / WBC x   Sq Epi: x / Non Sq Epi: x / Bacteria: x        [All pertinent / recent Imaging reviewed]         ---___---___---___---___---___---___ ---___---___---___---___---                         A S S E S S M E N T   A N D   P L A N :      HEALTH ISSUES - PROBLEM Dx:  Acute colitis  vomited today gi consult ordered  Weakness    AURA (acute kidney injury)  resolved   Major depression  continue meds  Dementia     continue meds      -GI/DVT Prophylaxis. as ordered    --------------------------------------------      ___________________________  Thank you,  [facility]  [phone]       ---___---___---___---___---___---___ ---___---___---___---___---___---___---___---___---                  M E D I C A L   A T T E N D I N G   P R O G R E S S   N O T E  ---___---___---___---___---___---___ ---___---___---___---___---___---___---___---___---        ================================================    ++CHIEF COMPLAINT:   Patient is a 89y old  Female who presents with a chief complaint of weakness (10 Sep 2024 08:26)      Noninfectious gastroenteritis vomited today       ---___---___---___---___---___---  PAST MEDICAL / Surgical  HISTORY:  PAST MEDICAL & SURGICAL HISTORY:  Depression      GERD (gastroesophageal reflux disease)      Hyperlipemia      Melanoma      Basal cell cancer      History of cholecystectomy      Hx of bilateral breast reduction surgery      Traumatic amputation of finger      S/P cataract surgery  left eye          ---___---___---___---___---___---  FAMILY HISTORY:   FAMILY HISTORY:  FH: breast cancer (Mother)          ---___---___---___---___---___---  ALLERGIES:   Allergies    iodine (Unknown)  Keflex (Other)  CT scan dye (Rhinitis)    Intolerances        ---___---___---___---___---___---  MEDICATIONS:  MEDICATIONS  (STANDING):  amLODIPine   Tablet 5 milliGRAM(s) Oral daily  atorvastatin 10 milliGRAM(s) Oral at bedtime  ciprofloxacin     Tablet 500 milliGRAM(s) Oral every 12 hours  heparin   Injectable 5000 Unit(s) SubCutaneous every 8 hours  latanoprost 0.005% Ophthalmic Solution 1 Drop(s) Both EYES at bedtime  melatonin 5 milliGRAM(s) Oral at bedtime  memantine 10 milliGRAM(s) Oral two times a day  metroNIDAZOLE    Tablet 500 milliGRAM(s) Oral every 8 hours  mirtazapine Soltab 45 milliGRAM(s) Oral at bedtime  pantoprazole    Tablet 40 milliGRAM(s) Oral before breakfast  polyethylene glycol 3350 17 Gram(s) Oral daily  QUEtiapine 25 milliGRAM(s) Oral two times a day  QUEtiapine 25 milliGRAM(s) Oral once  senna 2 Tablet(s) Oral at bedtime  sertraline 50 milliGRAM(s) Oral daily  sodium chloride 0.9%. 1000 milliLiter(s) (60 mL/Hr) IV Continuous <Continuous>    MEDICATIONS  (PRN):  acetaminophen     Tablet .. 650 milliGRAM(s) Oral every 6 hours PRN Temp greater or equal to 38C (100.4F), Mild Pain (1 - 3)  aluminum hydroxide/magnesium hydroxide/simethicone Suspension 30 milliLiter(s) Oral every 4 hours PRN Dyspepsia  OLANZapine Injectable 2.5 milliGRAM(s) IntraMuscular every 8 hours PRN Aggitation  ondansetron   Disintegrating Tablet 4 milliGRAM(s) Oral every 8 hours PRN Nausea and/or Vomiting      ---___---___---___---___---___---  REVIEW OF SYSTEM:  ARIANA  unable to obtain   ---___---___---___---___---___---  VITAL SIGNS:  89y , CAPILLARY BLOOD GLUCOSE        T(C): 37.1 (09-10-24 @ 19:55), Max: 37.1 (09-10-24 @ 19:55)  HR: 84 (09-10-24 @ 19:55) (77 - 87)  BP: 130/69 (09-10-24 @ 19:55) (130/69 - 176/78)  RR: 18 (09-10-24 @ 19:55) (18 - 20)  SpO2: 93% (09-10-24 @ 19:55) (92% - 94%)  ---___---___---___---___---___---  PHYSICAL EXAM:    GEN: A&O X2 , NAD , comfortable  HEENT: NCAT, PERRL, MMM, hearing intact  Neck: supple , no JVD  CVS: S1S2 , regular , No M/R/G appreciated  PULM: CTA B/L,  no W/R/R appreciated  ABD.: soft. non tender, non distended,  bowel sounds present  Extrem: intact pulses , no edema   Derm: No rash , no ecchymoses  PSYCH : normal mood,  no delusion not anxious     ---___---___---___---___---___---            LAB AND IMAGIN.0   10.12 )-----------( 170      ( 10 Sep 2024 06:59 )             37.2               09-10    140  |  107  |  23  ----------------------------<  107<H>  4.0   |  21<L>  |  1.14    Ca    11.3<H>      10 Sep 2024 06:59  Phos  2.2     09-10  Mg     2.2     09-10                              Urinalysis Basic - ( 10 Sep 2024 06:59 )    Color: x / Appearance: x / SG: x / pH: x  Gluc: 107 mg/dL / Ketone: x  / Bili: x / Urobili: x   Blood: x / Protein: x / Nitrite: x   Leuk Esterase: x / RBC: x / WBC x   Sq Epi: x / Non Sq Epi: x / Bacteria: x        [All pertinent / recent Imaging reviewed]         ---___---___---___---___---___---___ ---___---___---___---___---                         A S S E S S M E N T   A N D   P L A N :      HEALTH ISSUES - PROBLEM Dx:  Acute colitis  vomited today gi consult ordered  Weakness    AURA (acute kidney injury)  resolved   Major depression  continue meds  Dementia     continue meds      -GI/DVT Prophylaxis. as ordered    --------------------------------------------      ___________________________  Thank you,  [facility]  [phone]

## 2024-09-10 NOTE — PROGRESS NOTE ADULT - SUBJECTIVE AND OBJECTIVE BOX
Date of Service: 09-10-24 @ 07:58           CARDIOLOGY     PROGRESS  NOTE   ________________________________________________    CHIEF COMPLAINT:Patient is a 89y old  Female who presents with a chief complaint of weakness (09 Sep 2024 20:31)  no complain  	  REVIEW OF SYSTEMS:  CONSTITUTIONAL: No fever, weight loss, or fatigue  EYES: No eye pain, visual disturbances, or discharge  ENT:  No difficulty hearing, tinnitus, vertigo; No sinus or throat pain  NECK: No pain or stiffness  RESPIRATORY: No cough, wheezing, chills or hemoptysis; No Shortness of Breath  CARDIOVASCULAR: No chest pain, palpitations, passing out, dizziness, or leg swelling  GASTROINTESTINAL: No abdominal or epigastric pain. No nausea, vomiting, or hematemesis; No diarrhea or constipation. No melena or hematochezia.  GENITOURINARY: No dysuria, frequency, hematuria, or incontinence  NEUROLOGICAL: No headaches, memory loss, loss of strength, numbness, or tremors  SKIN: No itching, burning, rashes, or lesions   LYMPH Nodes: No enlarged glands  ENDOCRINE: No heat or cold intolerance; No hair loss  MUSCULOSKELETAL: No joint pain or swelling; No muscle, back, or extremity pain  PSYCHIATRIC: No depression, anxiety, mood swings, or difficulty sleeping  HEME/LYMPH: No easy bruising, or bleeding gums  ALLERGY AND IMMUNOLOGIC: No hives or eczema	    [ ] All others negative	  [x ] Unable to obtain    PHYSICAL EXAM:  T(C): 37 (09-09-24 @ 19:56), Max: 37 (09-09-24 @ 12:18)  HR: 87 (09-10-24 @ 06:15) (71 - 87)  BP: 176/78 (09-10-24 @ 06:15) (160/65 - 176/78)  RR: 18 (09-10-24 @ 06:15) (18 - 18)  SpO2: 92% (09-10-24 @ 06:15) (92% - 93%)  Wt(kg): --  I&O's Summary      Appearance: Normal	  HEENT:   Normal oral mucosa, PERRL, EOMI	  Lymphatic: No lymphadenopathy  Cardiovascular: Normal S1 S2, No JVD, +murmurs, No edema  Respiratory: rhonchi  Psychiatry: A & O x 3, Mood & affect appropriate  Gastrointestinal:  Soft, Non-tender, + BS	  Skin: No rashes, No ecchymoses, No cyanosis	  Extremities: Normal range of motion, No clubbing, cyanosis or edema  Vascular: Peripheral pulses palpable 2+ bilaterally    MEDICATIONS  (STANDING):  atorvastatin 10 milliGRAM(s) Oral at bedtime  ciprofloxacin     Tablet 500 milliGRAM(s) Oral every 12 hours  heparin   Injectable 5000 Unit(s) SubCutaneous every 8 hours  latanoprost 0.005% Ophthalmic Solution 1 Drop(s) Both EYES at bedtime  melatonin 5 milliGRAM(s) Oral at bedtime  memantine 10 milliGRAM(s) Oral two times a day  metroNIDAZOLE    Tablet 500 milliGRAM(s) Oral every 8 hours  mirtazapine Soltab 45 milliGRAM(s) Oral at bedtime  pantoprazole    Tablet 40 milliGRAM(s) Oral before breakfast  polyethylene glycol 3350 17 Gram(s) Oral daily  QUEtiapine 25 milliGRAM(s) Oral once  QUEtiapine 25 milliGRAM(s) Oral two times a day  senna 2 Tablet(s) Oral at bedtime  sertraline 50 milliGRAM(s) Oral daily  sodium chloride 0.9%. 1000 milliLiter(s) (60 mL/Hr) IV Continuous <Continuous>      TELEMETRY: 	    ECG:  	  RADIOLOGY:  OTHER: 	  	  LABS:	 	    CARDIAC MARKERS:                                12.0   10.12 )-----------( 170      ( 10 Sep 2024 06:59 )             37.2           proBNP:   Lipid Profile:   HgA1c:   TSH:         Assessment and plan  ---------------------------  89 yo F with hx dementia GERD, HLD, carotid stenosis, depression p/w weakness and difficulty ambulating. Patient states she was walking when she felt like her legs were going to give out. She felt weak at the time and felt like she may fall so she sat down on a nearby couch. One of the people at the facility told her she should get checked out and called for her to come here. She states she just felt weak and denies chest pain, sob, lightheadness, dizziness when this occurred. It has never happened before. She felt better after she sat down on the couch. Currently no complaints and wants to go back home. does not know why she is here. no fevers, chills, palpitations, dysuria, diarrhea, constipation. some abdominal discomfort in lower area.    pt with ?carotid stenosis with ?near syncope  observe HR, bp elevated ?agitation  check orthostatic  asa 81 mg daily  physical therapy  GOC  dvt prophylaxis  check ua and cultures. noted  will order cipro, will consider GI eval  repeat calcium level is improving  chest x ray noted pneumoniae ?aspiration, check speech and swallow  ecg to check qtc interval  will adjust bp meds  continue ivf for hypercalcemia, needs to be followed  +mild orthostatathic/ htn sonia add bp meds

## 2024-09-10 NOTE — PROGRESS NOTE ADULT - SUBJECTIVE AND OBJECTIVE BOX
Neurology Progress Note    S: Patient seen and examined. No new events overnight.        Medications: MEDICATIONS  (STANDING):  amLODIPine   Tablet 5 milliGRAM(s) Oral daily  atorvastatin 10 milliGRAM(s) Oral at bedtime  ciprofloxacin     Tablet 500 milliGRAM(s) Oral every 12 hours  heparin   Injectable 5000 Unit(s) SubCutaneous every 8 hours  latanoprost 0.005% Ophthalmic Solution 1 Drop(s) Both EYES at bedtime  melatonin 5 milliGRAM(s) Oral at bedtime  memantine 10 milliGRAM(s) Oral two times a day  metroNIDAZOLE    Tablet 500 milliGRAM(s) Oral every 8 hours  mirtazapine Soltab 45 milliGRAM(s) Oral at bedtime  pantoprazole    Tablet 40 milliGRAM(s) Oral before breakfast  polyethylene glycol 3350 17 Gram(s) Oral daily  QUEtiapine 25 milliGRAM(s) Oral once  QUEtiapine 25 milliGRAM(s) Oral two times a day  senna 2 Tablet(s) Oral at bedtime  sertraline 50 milliGRAM(s) Oral daily  sodium chloride 0.9%. 1000 milliLiter(s) (60 mL/Hr) IV Continuous <Continuous>    MEDICATIONS  (PRN):  acetaminophen     Tablet .. 650 milliGRAM(s) Oral every 6 hours PRN Temp greater or equal to 38C (100.4F), Mild Pain (1 - 3)  aluminum hydroxide/magnesium hydroxide/simethicone Suspension 30 milliLiter(s) Oral every 4 hours PRN Dyspepsia  OLANZapine Injectable 2.5 milliGRAM(s) IntraMuscular every 8 hours PRN Aggitation       Vitals:  Vital Signs Last 24 Hrs  T(C): 37 (09 Sep 2024 19:56), Max: 37 (09 Sep 2024 12:18)  T(F): 98.6 (09 Sep 2024 19:56), Max: 98.6 (09 Sep 2024 12:18)  HR: 87 (10 Sep 2024 06:15) (71 - 87)  BP: 176/78 (10 Sep 2024 06:15) (160/65 - 176/78)  BP(mean): --  RR: 18 (10 Sep 2024 06:15) (18 - 18)  SpO2: 92% (10 Sep 2024 06:15) (92% - 93%)    Parameters below as of 10 Sep 2024 06:15  Patient On (Oxygen Delivery Method): room air           General Exam:   General Appearance: Appropriately dressed and in no acute distress       Head: Normocephalic, atraumatic and no dysmorphic features  Ear, Nose, and Throat: Moist mucous membranes  CVS: S1S2+  Resp: No SOB, no wheeze or rhonchi  Abd: soft NTND  Extremities: No edema, no cyanosis  Skin: No bruises, no rashes     Neurological Exam:    - Mental Status:   level of consciousness: Awake, alert  Orientation: Oriented to person and place, not age (knows she is nearly 89 and knows her birthday but says would have to do math to know her age), not day/month, knows year. Does not know the current president/candidates says she stopped paying attention duie to her age  Language: Speech is fluent with intact naming, repetition, and ability to follow commands (including simple commands and complex cross commands)  Cortical Signs: No extinction to visual or tactile DSS, no hemineglect.     - Cranial Nerves:   PERRL, VFF OD, questionable monocular LEFT eye superior nasal defect, EOMI, facial sensation is intact in the V1-V3 distribution bilaterally; face is symmetric with normal smile; hearing is intact to finger rub bilaterally and equally; uvula is midline and soft palate rises symmetrically; shoulder shrug intact; tongue protrudes in the midline with intact lateral movements    - Motor Testing:  Bulk: Thin without focal atrophy  Strength:  There is no pronator drift b/l  There is no leg drift b/l                              Right           Left                        5                   5    - Sensory: Intact throughout to light touch.   - Coordination: Finger-nose-finger intact without dysmetria.    - Gait: slow but steady gait with short steps. No imbalance.     I personally reviewed the below data/images/labs:    LABS:                          12.0   10.12 )-----------( 170      ( 10 Sep 2024 06:59 )             37.2     09-10    140  |  107  |  23  ----------------------------<  107<H>  4.0   |  21<L>  |  1.14    Ca    11.3<H>      10 Sep 2024 06:59  Phos  2.2     09-10  Mg     2.2     09-10          Urinalysis Basic - ( 10 Sep 2024 06:59 )    Color: x / Appearance: x / SG: x / pH: x  Gluc: 107 mg/dL / Ketone: x  / Bili: x / Urobili: x   Blood: x / Protein: x / Nitrite: x   Leuk Esterase: x / RBC: x / WBC x   Sq Epi: x / Non Sq Epi: x / Bacteria: x           CT Head No Cont:  (05 Sep 2024 21:28)    < from: CT Head No Cont (09.05.24 @ 21:28) >    ACC: [id number] EXAM:  CT BRAIN   ORDERED BY:  KRYS SIDHU     PROCEDURE DATE:  09/05/2024          INTERPRETATION:  CLINICAL INFORMATION: Code stroke, difficulty ambulating    COMPARISON: CT head 1/11/2022    CONTRAST:  IV Contrast: NONE  Complications: None reported at time of study completion    TECHNIQUE:  Serial axial images were obtained from the skull base to the   vertex using multi-slice helical technique. Sagittal and coronal   reformats were obtained.    FINDINGS:    VENTRICLES AND SULCI: Age appropriate involutional changes.  INTRA-AXIAL: No acute hemorrhage, vasogenic edema or evidence for acute   territorial infarct.  There are stable periventricular and subcortical   white matter hypodensities, consistent with microvascular type changes.  EXTRA-AXIAL: No mass or fluid collection. Basal cisterns are clear.  VISUALIZED SINUSES:  Foamy secretions left sphenoid sinus. The remaining   clear.  TYMPANOMASTOID CAVITIES:  Clear.  VISUALIZED ORBITS: Bilateral lens replacement.  CALVARIUM: Intact.    MISCELLANEOUS: None.      IMPRESSION:  Stable head CT. No acute intracranial hemorrhage, vasogenic edema,   extra-axial collection or hydrocephalus. Mild chronic microvascular   changes.    Findings were discussed with Dr. Lopez at9/5/2024 9:14 PM by Dr. Palomino   with read back confirmation.    --- End of Report ---           AMIE PALOMINO MD; Resident Radiologist  This document has been electronically signed.  PRATIMA WETZEL MD; Attending Radiologist  This document has been electronically signed. Sep  5 2024  9:32PM    < end of copied text >      < from: CT Angio Head w/ IV Cont (09.06.24 @ 02:53) >    ACC: [id number] EXAM:  CT ANGIO NECK (W)AW IC   ORDERED BY:  KRYS SIDHU     ACC: [id number] EXAM:  CT ANGIO BRAIN (W)AW IC   ORDERED BY:  KRYS SIDHU     PROCEDURE DATE:  09/06/2024          INTERPRETATION:  CLINICAL INFORMATION: Code stroke, gait difficulty    COMPARISON: CT head 9/5/2024    CONTRAST:  IV Contrast: Omnipaque 350 (accession [id number]), IV contrast documented   in unlinked concurrent exam (accession [id number])  90 cc administered  10   cc discarded  Complications: None reported at time of study completion    TECHNIQUE:  CT BRAIN: Serial axial images were obtained from the skull base to the   vertex without the use of contrast.    CTA NECK/HEAD: After the intravenous power injection of non-ionic   contrast material, serialthin sections were obtained through the   cervical and intracranial circulation on a multislice CT scanner. Axial,   Coronal and Sagittal MIP reformatted images were obtained. 3D   reconstruction was also performed.    FINDINGS:    CT BRAIN:  VENTRICLES AND SULCI: Age appropriate involutional changes.  INTRA-AXIAL: No acute hemorrhage, vasogenic edema or signs for acute   territorial infarct.  There are periventricular and subcortical white   matter hypodensities, consistent with microvascular type changes.  EXTRA-AXIAL: No mass or fluid collection. Basal cisterns are clear.  VISUALIZED SINUSES:  Foamy secretions in the left sphenoid sinus.  TYMPANOMASTOID CAVITIES:  Clear.  VISUALIZED ORBITS: Bilateral lens replacement.  CALVARIUM: Intact.    MISCELLANEOUS: None.      CTA NECK:    AORTIC ARCH AND VISUALIZED GREAT VESSELS: Within normal limits.    RIGHT:  COMMON CAROTID ARTERY: No significant stenosis to the carotid   bifurcation. Tortuous course proximally.  INTERNAL CAROTID ARTERY: No significant stenosis based on NASCET criteria.  VERTEBRAL ARTERY: Normal in caliber to the intracranial circulation.   Tortuous course.    LEFT:  COMMON CAROTID ARTERY: No significant stenosis to the carotid   bifurcation. Tortuous course proximally.  INTERNAL CAROTID ARTERY: No significant stenosis based on NASCET   criteria. Luminal irregularity with a subacute appearance suggesting   underlying FMD.  VERTEBRAL ARTERY: Normal in caliber to the intracranial circulation.   Tortuous course.    VISUALIZED LUNGS: Limited evaluation due to respiratory motion.    MISCELLANEOUS: None.    CAROTID STENOSIS REFERENCE: Percent (%) stenosis is expressed in terms of   NASCET Criteria. (NASCET = 100x1-(N/D)). N=greatest narrowing. D=normal   distal diameter- MILD = <50% stenosis. - MODERATE = 50-69% stenosis. -   SEVERE = 70-89% stenosis. - HAIRLINE/CRITICAL = 90-99% stenosis. -   OCCLUDED = 100% stenosis.      CTA HEAD:  INTERNAL CAROTID ARTERIES: Bilateral petrous, precavernous, cavernous,   and supraclinoid regions are patent without significant stenosis.  [place]: No aneurysm identified. Tiny aneurysms can be beyond   the resolution of CTA technique.  Fetal origin of the right PCA.  ANTERIOR CEREBRAL ARTERIES: No significant stenosis or occlusion.  MIDDLE CEREBRAL ARTERIES: No significant stenosis or occlusion.  POSTERIOR CEREBRAL ARTERIES: No significant stenosis or occlusion.  DISTAL VERTEBRAL / BASILAR ARTERIES: No significant stenosis or occlusion.    VENOUS STRUCTURES: Visualized superficial and deep venous structures   appear patent.  MISCELLANEOUS: No other vascular abnormality is seen.      IMPRESSION:  CT HEAD: No acute intracranial finding. No significant interval change   from most recent head CT.  CTA NECK: No vessel occlusion, hemodynamically significant stenosis or   dissection. 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  CTA HEAD: No large vessel occlusion, significant stenosis or vascular   abnormality identified.        --- End of Report ---

## 2024-09-11 ENCOUNTER — TRANSCRIPTION ENCOUNTER (OUTPATIENT)
Age: 89
End: 2024-09-11

## 2024-09-11 ENCOUNTER — INPATIENT (INPATIENT)
Facility: HOSPITAL | Age: 89
LOS: 4 days | Discharge: NURSING, REHAB OR LONG-TERM CARE | DRG: 312 | End: 2024-09-16
Attending: FAMILY MEDICINE | Admitting: FAMILY MEDICINE
Payer: MEDICARE

## 2024-09-11 VITALS
DIASTOLIC BLOOD PRESSURE: 57 MMHG | RESPIRATION RATE: 18 BRPM | TEMPERATURE: 98 F | OXYGEN SATURATION: 92 % | SYSTOLIC BLOOD PRESSURE: 102 MMHG | HEART RATE: 71 BPM

## 2024-09-11 VITALS
DIASTOLIC BLOOD PRESSURE: 73 MMHG | RESPIRATION RATE: 18 BRPM | HEIGHT: 60 IN | TEMPERATURE: 96 F | HEART RATE: 78 BPM | SYSTOLIC BLOOD PRESSURE: 105 MMHG | OXYGEN SATURATION: 98 %

## 2024-09-11 DIAGNOSIS — M25.551 PAIN IN RIGHT HIP: ICD-10-CM

## 2024-09-11 LAB
ALBUMIN SERPL ELPH-MCNC: 3.8 G/DL — SIGNIFICANT CHANGE UP (ref 3.3–5)
ALP SERPL-CCNC: 119 U/L — SIGNIFICANT CHANGE UP (ref 40–120)
ALT FLD-CCNC: 78 U/L — HIGH (ref 10–45)
ANION GAP SERPL CALC-SCNC: 12 MMOL/L — SIGNIFICANT CHANGE UP (ref 5–17)
AST SERPL-CCNC: 100 U/L — HIGH (ref 10–40)
BILIRUB SERPL-MCNC: 0.3 MG/DL — SIGNIFICANT CHANGE UP (ref 0.2–1.2)
BUN SERPL-MCNC: 27 MG/DL — HIGH (ref 7–23)
CALCIUM SERPL-MCNC: 11.3 MG/DL — HIGH (ref 8.4–10.5)
CHLORIDE SERPL-SCNC: 104 MMOL/L — SIGNIFICANT CHANGE UP (ref 96–108)
CO2 SERPL-SCNC: 23 MMOL/L — SIGNIFICANT CHANGE UP (ref 22–31)
CREAT SERPL-MCNC: 1.15 MG/DL — SIGNIFICANT CHANGE UP (ref 0.5–1.3)
EGFR: 44 ML/MIN/1.73M2 — LOW
GLUCOSE SERPL-MCNC: 117 MG/DL — HIGH (ref 70–99)
HCT VFR BLD CALC: 37.5 % — SIGNIFICANT CHANGE UP (ref 34.5–45)
HGB BLD-MCNC: 11.7 G/DL — SIGNIFICANT CHANGE UP (ref 11.5–15.5)
MCHC RBC-ENTMCNC: 30.2 PG — SIGNIFICANT CHANGE UP (ref 27–34)
MCHC RBC-ENTMCNC: 31.2 GM/DL — LOW (ref 32–36)
MCV RBC AUTO: 96.9 FL — SIGNIFICANT CHANGE UP (ref 80–100)
NRBC # BLD: 0 /100 WBCS — SIGNIFICANT CHANGE UP (ref 0–0)
PLATELET # BLD AUTO: 186 K/UL — SIGNIFICANT CHANGE UP (ref 150–400)
POTASSIUM SERPL-MCNC: 4.4 MMOL/L — SIGNIFICANT CHANGE UP (ref 3.5–5.3)
POTASSIUM SERPL-SCNC: 4.4 MMOL/L — SIGNIFICANT CHANGE UP (ref 3.5–5.3)
PROT SERPL-MCNC: 7.1 G/DL — SIGNIFICANT CHANGE UP (ref 6–8.3)
RBC # BLD: 3.87 M/UL — SIGNIFICANT CHANGE UP (ref 3.8–5.2)
RBC # FLD: 14.6 % — HIGH (ref 10.3–14.5)
SODIUM SERPL-SCNC: 139 MMOL/L — SIGNIFICANT CHANGE UP (ref 135–145)
WBC # BLD: 11.59 K/UL — HIGH (ref 3.8–10.5)
WBC # FLD AUTO: 11.59 K/UL — HIGH (ref 3.8–10.5)

## 2024-09-11 PROCEDURE — 99231 SBSQ HOSP IP/OBS SF/LOW 25: CPT

## 2024-09-11 PROCEDURE — 99222 1ST HOSP IP/OBS MODERATE 55: CPT | Mod: GC

## 2024-09-11 PROCEDURE — 72170 X-RAY EXAM OF PELVIS: CPT | Mod: 26

## 2024-09-11 PROCEDURE — 74018 RADEX ABDOMEN 1 VIEW: CPT | Mod: 26

## 2024-09-11 PROCEDURE — 99285 EMERGENCY DEPT VISIT HI MDM: CPT | Mod: GC

## 2024-09-11 PROCEDURE — 70450 CT HEAD/BRAIN W/O DYE: CPT | Mod: 26,MC

## 2024-09-11 RX ADMIN — Medication 5000 UNIT(S): at 06:15

## 2024-09-11 RX ADMIN — Medication 500 MILLIGRAM(S): at 06:15

## 2024-09-11 RX ADMIN — POLYETHYLENE GLYCOL 3350 17 GRAM(S): 17 POWDER, FOR SOLUTION ORAL at 12:56

## 2024-09-11 RX ADMIN — Medication 500 MILLIGRAM(S): at 13:11

## 2024-09-11 RX ADMIN — Medication 10 MILLIGRAM(S): at 12:56

## 2024-09-11 RX ADMIN — MEMANTINE 10 MILLIGRAM(S): 7 CAPSULE, EXTENDED RELEASE ORAL at 06:15

## 2024-09-11 RX ADMIN — SERTRALINE HYDROCHLORIDE 50 MILLIGRAM(S): 50 TABLET, FILM COATED ORAL at 12:56

## 2024-09-11 RX ADMIN — Medication 25 MILLIGRAM(S): at 06:15

## 2024-09-11 RX ADMIN — Medication 40 MILLIGRAM(S): at 06:15

## 2024-09-11 RX ADMIN — Medication 5000 UNIT(S): at 02:44

## 2024-09-11 RX ADMIN — OLANZAPINE 2.5 MILLIGRAM(S): 7.5 TABLET ORAL at 04:52

## 2024-09-11 RX ADMIN — AMLODIPINE BESYLATE 5 MILLIGRAM(S): 10 TABLET ORAL at 06:15

## 2024-09-11 NOTE — PROVIDER CONTACT NOTE (OTHER) - NAME OF MD/NP/PA/DO NOTIFIED:
Nolvia Robertson NP
[facility] Ionie Chambers.
Roselyn Multani
Roselyn Multani
EDY Parker

## 2024-09-11 NOTE — PROVIDER CONTACT NOTE (OTHER) - REASON
Patient's BP is 176/78 with HR 87 this morning.
pt refusing tele, vitals and meds
pt removed IV
Patient refusing medication
Patient is desating at 81% during routine vital signs check.

## 2024-09-11 NOTE — PROVIDER CONTACT NOTE (OTHER) - ACTION/TREATMENT ORDERED:
ACP made aware.
Provider made aware. Medication pushed to later time and day team to be notifed.
provider notified. Will continue to reorient and reeducate pt on importance of IV fluid and antibiotics. Pt ok to not have IV access at this time as per provider
Patient started on 2LNC sating >90% at this time. ACP made aware. Keep patient on 2LNC at this time as per ACP.
provider notified. reorient and educate pt as needed. attempt to contact daughter to help reorient and educate as allowed

## 2024-09-11 NOTE — BH CONSULTATION LIAISON PROGRESS NOTE - CURRENT MEDICATION
MEDICATIONS  (STANDING):  atorvastatin 10 milliGRAM(s) Oral at bedtime  ciprofloxacin     Tablet 500 milliGRAM(s) Oral every 12 hours  heparin   Injectable 5000 Unit(s) SubCutaneous every 8 hours  latanoprost 0.005% Ophthalmic Solution 1 Drop(s) Both EYES at bedtime  melatonin 5 milliGRAM(s) Oral at bedtime  memantine 10 milliGRAM(s) Oral two times a day  metroNIDAZOLE    Tablet 500 milliGRAM(s) Oral every 8 hours  mirtazapine Soltab 45 milliGRAM(s) Oral at bedtime  pantoprazole    Tablet 40 milliGRAM(s) Oral before breakfast  QUEtiapine 25 milliGRAM(s) Oral once  QUEtiapine 25 milliGRAM(s) Oral two times a day  sertraline 50 milliGRAM(s) Oral daily  sodium chloride 0.9%. 1000 milliLiter(s) (60 mL/Hr) IV Continuous <Continuous>    MEDICATIONS  (PRN):  acetaminophen     Tablet .. 650 milliGRAM(s) Oral every 6 hours PRN Temp greater or equal to 38C (100.4F), Mild Pain (1 - 3)  aluminum hydroxide/magnesium hydroxide/simethicone Suspension 30 milliLiter(s) Oral every 4 hours PRN Dyspepsia  OLANZapine Injectable 2.5 milliGRAM(s) IntraMuscular every 8 hours PRN Aggitation  
MEDICATIONS  (STANDING):  atorvastatin 10 milliGRAM(s) Oral at bedtime  ciprofloxacin     Tablet 500 milliGRAM(s) Oral every 12 hours  heparin   Injectable 5000 Unit(s) SubCutaneous every 8 hours  latanoprost 0.005% Ophthalmic Solution 1 Drop(s) Both EYES at bedtime  melatonin 5 milliGRAM(s) Oral at bedtime  memantine 10 milliGRAM(s) Oral two times a day  metroNIDAZOLE    Tablet 500 milliGRAM(s) Oral every 8 hours  mirtazapine Soltab 45 milliGRAM(s) Oral at bedtime  pantoprazole    Tablet 40 milliGRAM(s) Oral before breakfast  QUEtiapine 25 milliGRAM(s) Oral two times a day  QUEtiapine 25 milliGRAM(s) Oral once  sertraline 50 milliGRAM(s) Oral daily  sodium chloride 0.9%. 1000 milliLiter(s) (60 mL/Hr) IV Continuous <Continuous>    MEDICATIONS  (PRN):  acetaminophen     Tablet .. 650 milliGRAM(s) Oral every 6 hours PRN Temp greater or equal to 38C (100.4F), Mild Pain (1 - 3)  aluminum hydroxide/magnesium hydroxide/simethicone Suspension 30 milliLiter(s) Oral every 4 hours PRN Dyspepsia  OLANZapine Injectable 2.5 milliGRAM(s) IntraMuscular every 8 hours PRN Aggitation  
MEDICATIONS  (STANDING):  amLODIPine   Tablet 5 milliGRAM(s) Oral daily  atorvastatin 10 milliGRAM(s) Oral at bedtime  ciprofloxacin     Tablet 500 milliGRAM(s) Oral every 12 hours  heparin   Injectable 5000 Unit(s) SubCutaneous every 8 hours  latanoprost 0.005% Ophthalmic Solution 1 Drop(s) Both EYES at bedtime  melatonin 5 milliGRAM(s) Oral at bedtime  memantine 10 milliGRAM(s) Oral two times a day  metroNIDAZOLE    Tablet 500 milliGRAM(s) Oral every 8 hours  mirtazapine Soltab 45 milliGRAM(s) Oral at bedtime  pantoprazole    Tablet 40 milliGRAM(s) Oral before breakfast  polyethylene glycol 3350 17 Gram(s) Oral daily  QUEtiapine 25 milliGRAM(s) Oral once  QUEtiapine 25 milliGRAM(s) Oral two times a day  senna 2 Tablet(s) Oral at bedtime  sertraline 50 milliGRAM(s) Oral daily  sodium chloride 0.9%. 1000 milliLiter(s) (60 mL/Hr) IV Continuous <Continuous>    MEDICATIONS  (PRN):  acetaminophen     Tablet .. 650 milliGRAM(s) Oral every 6 hours PRN Temp greater or equal to 38C (100.4F), Mild Pain (1 - 3)  aluminum hydroxide/magnesium hydroxide/simethicone Suspension 30 milliLiter(s) Oral every 4 hours PRN Dyspepsia  OLANZapine Injectable 2.5 milliGRAM(s) IntraMuscular every 8 hours PRN Aggitation  ondansetron   Disintegrating Tablet 4 milliGRAM(s) Oral every 8 hours PRN Nausea and/or Vomiting

## 2024-09-11 NOTE — BH CONSULTATION LIAISON PROGRESS NOTE - NSBHCONSULTFOLLOWAFTERCARE_PSY_A_CORE FT
if no psych at [facility] >> [facility] [phone]; [facility] outpt [facility] [phone]
if no psych at [facility] >> [facility] [phone]; [facility] outpt [facility] [phone]
if no psych at [facility] >> [facility] [phone]; [facility] outpt [facility] [phone]

## 2024-09-11 NOTE — ED ADULT NURSE NOTE - OBJECTIVE STATEMENT
pt is a 89yo female PMH HLD, GERD BIBEMS from [facility] complaining of fall. pt DCd from ED earlier tonight, reports mechanical trip and fall upon returning to [facility], pt states she was trying to fix blinds on window when she "missed her step" and fell from standing height, pt denies head strike, denies LOC, no AC, pt recalls entire event before, during, and after, pt found down by [facility] staff, EMS called, pt ambulatory with assistance following fall. pt currently endorsing b/l lower back pain on arrival. pt A&Ox3 gross neuro intact, no difficulty speaking in complete sentences, pulses x 4, emmanuel x4, abdomen soft nontender nondistended, skin intact. pt denies chest pain, sob, ha, n/v/d, abdominal pain, f/c, urinary symptoms, hematuria pt is a 89yo female PMH HLD, GERD BIBEMS from [facility] complaining of fall. pt DCd from [facility] earlier tonight, reports mechanical trip and fall upon returning to [facility], pt states she was trying to fix blinds on window when she "missed her step" and fell from standing height, pt denies head strike, denies LOC, no AC, pt recalls entire event before, during, and after, pt found down by [facility] staff, EMS called, pt ambulatory with assistance following fall. pt currently endorsing b/l lower back pain on arrival. pt A&Ox2, disoriented to time, gross neuro intact, no difficulty speaking in complete sentences, pulses x 4, emmanuel x4, abdomen soft nontender nondistended, skin intact. pt denies chest pain, sob, ha, n/v/d, abdominal pain, f/c, urinary symptoms, hematuria

## 2024-09-11 NOTE — CONSULT NOTE ADULT - ASSESSMENT
89F PMHx Depression, Dementia, HLD, GERD, Constipation, Diverticulitis/IBS, carotid stenosis, not on daily antiplatelets presenting from assisted living w/ gait difficulty, consulted for constipation, x-ray abdomen indicates moderate stool burden, most concerned for constipation.      #Constipation  X-ray abdomen (9/11) indicates nonobstructive bowel gas pattern  Patient had enema yesterday  Had one bowel movement today  Denies nausea and vomiting      Recommendations:  -No evidence of bowel obstruction on x-ray  -Discharge on bowel regimen to avoid constipation 89F PMHx Depression, Dementia, HLD, GERD, Constipation, Diverticulitis/IBS, carotid stenosis, not on daily antiplatelets presenting from assisted living w/ gait difficulty, consulted for constipation, x-ray abdomen indicates moderate stool burden, most concerned for constipation.      #Constipation  X-ray abdomen (9/11) indicates nonobstructive bowel gas pattern  Patient had enema yesterday  Had one bowel movement today and two yesterday.   Denies nausea and vomiting      Recommendations:  -No evidence of bowel obstruction on x-ray  -Please discharge patient on bowel regimen to avoid constipation    Note incomplete until finalized by attending signature/attestation.    Irma Woodruff  GI/Hepatology Fellow    MONDAY-FRIDAY 8AM-5PM:  Pager# [phone] ([facility]) or [phone] ([facility])    NON-URGENT CONSULTS:  Please email [email or web address]  AT NIGHT AND ON WEEKENDS:  Contact on-call GI fellow from 5pm-8am and on weekends/holidays

## 2024-09-11 NOTE — PROVIDER CONTACT NOTE (OTHER) - RECOMMENDATIONS
Notify ACP
Provider made aware. Medication pushed to later time and day team to be notifed.
Notify ACP. Recheck BP when patient in less agitated and calms down.
Daughter called but did not answer phone call. Provider notified about non compliance. RN educated pt about importance of meds scott antibiotic. Will continue to reorient pt and try to administer meds
provider notified. daughter of pt notified. Provider stated ok for pt to not have IV access at this time

## 2024-09-11 NOTE — DISCHARGE NOTE NURSING/CASE MANAGEMENT/SOCIAL WORK - NSDCPEFALRISK_GEN_ALL_CORE
For information on Fall & Injury Prevention, visit: [email or web address] OR  [email or web address] OR  https://www.cdc.gov/steadi/patient.html

## 2024-09-11 NOTE — ED ADULT NURSE NOTE - NSFALLHARMRISKINTERV_ED_ALL_ED

## 2024-09-11 NOTE — BH CONSULTATION LIAISON PROGRESS NOTE - NSBHFUPINTERVALHXFT_PSY_A_CORE
pt remains confused, poor historian, states she needs to go home. pt currently calm.  pt remains confused, poor historian, states she needs to go home. pt currently calm. ot agitated last night, received zyprexa prns

## 2024-09-11 NOTE — PROGRESS NOTE ADULT - PROVIDER SPECIALTY LIST ADULT
Cardiology
Family Medicine
Cardiology
Family Medicine
Neurology
Cardiology
Family Medicine
Family Medicine
Neurology
Neurology

## 2024-09-11 NOTE — DISCHARGE NOTE PROVIDER - PROVIDER TOKENS
PROVIDER:[TOKEN:[[id number]],FOLLOWUP:[1 week]] PROVIDER:[TOKEN:[[id number]],FOLLOWUP:[1 week]],PROVIDER:[TOKEN:[[id number]],FOLLOWUP:[1 week],ESTABLISHEDPATIENT:[T]]

## 2024-09-11 NOTE — DISCHARGE NOTE PROVIDER - CARE PROVIDERS DIRECT ADDRESSES
,[email or web address],DirectAddress_Unknown

## 2024-09-11 NOTE — BH CONSULTATION LIAISON PROGRESS NOTE - NSBHCHARTREVIEWVS_PSY_A_CORE FT
Vital Signs Last 24 Hrs  T(C): 37 (09 Sep 2024 12:18), Max: 37 (09 Sep 2024 12:18)  T(F): 98.6 (09 Sep 2024 12:18), Max: 98.6 (09 Sep 2024 12:18)  HR: 71 (09 Sep 2024 12:18) (71 - 81)  BP: 160/65 (09 Sep 2024 12:18) (135/71 - 160/65)  BP(mean): --  RR: 18 (09 Sep 2024 12:18) (18 - 18)  SpO2: 93% (09 Sep 2024 12:18) (92% - 93%)    Parameters below as of 09 Sep 2024 12:18  Patient On (Oxygen Delivery Method): room air    
Vital Signs Last 24 Hrs  T(C): 36.8 (11 Sep 2024 16:07), Max: 37.1 (10 Sep 2024 19:55)  T(F): 98.2 (11 Sep 2024 16:07), Max: 98.7 (10 Sep 2024 19:55)  HR: 71 (11 Sep 2024 16:07) (71 - 96)  BP: 102/57 (11 Sep 2024 16:07) (102/57 - 156/76)  BP(mean): --  RR: 18 (11 Sep 2024 16:07) (18 - 20)  SpO2: 92% (11 Sep 2024 16:07) (92% - 95%)    Parameters below as of 11 Sep 2024 16:07  Patient On (Oxygen Delivery Method): room air    
Vital Signs Last 24 Hrs  T(C): 36.7 (08 Sep 2024 13:27), Max: 37.3 (07 Sep 2024 20:34)  T(F): 98 (08 Sep 2024 13:27), Max: 99.1 (07 Sep 2024 20:34)  HR: 90 (08 Sep 2024 13:27) (79 - 90)  BP: 140/75 (08 Sep 2024 13:27) (137/75 - 173/77)  BP(mean): --  RR: 18 (08 Sep 2024 13:27) (18 - 18)  SpO2: 92% (08 Sep 2024 13:27) (92% - 92%)    Parameters below as of 08 Sep 2024 13:27  Patient On (Oxygen Delivery Method): room air

## 2024-09-11 NOTE — DISCHARGE NOTE PROVIDER - HOSPITAL COURSE
HPI:  89 yo F with hx dementia GERD, HLD, carotid stenosis, depression p/w weakness and difficulty ambulating. Patient states she was walking when she felt like her legs were going to give out. She felt weak at the time and felt like she may fall so she sat down on a nearby couch. One of the people at the facility told her she should get checked out and called for her to come here. She states she just felt weak and denies chest pain, sob, lightheadness, dizziness when this occurred. It has never happened before. She felt better after she sat down on the couch. Currenltly no complaints and wants to go back home. does not know why she is here. no fevers, chills, palpitations, dysuria, diarrhea, constipation. some abdominal discomfort in lower area.  (06 Sep 2024 09:17)    Hospital Course:  pt admitted with  gait difficulty. Per [facility] staff pt tried to get up at  6pm but felt dizzy and unable to ambulate. Denies vertigo/Dizziness here. -179/55-61, HR 60s-70s, RR18-20 satting well on RA. FSBG 99. Exam without focal neurological deficits. Questionable monocular left eye superior nasal defect. Doesnt know age/month. Steady gait. Labs w/ sCR 1.42/BUN 32, Cl112, , iCal elevated 1.56, pCO2 51 w/ bicarb 24 and normal pH 7.35. Normal coags.  UA w/ trace LE. CTAP w/ left hydronephrosis, Mild wall thickening versus underdistention of the ascending through proximal sigmoid colon possibly c/w colitis. CTH w/ senescent and mild chronic microvascular changes. no new ischemic findings. CTA w/ left neck ICA Luminal irregularity suggesting FMD. +NOLBERTO, alk phos elevated . Neurology appreciated. r/o toxic metabolic encephalopathy in the setting of colitis and nolberto.    s/p course of cipro/flagyl for colitis. NOLBERTO improved with IVF. HTN controlled with addition of norvasc. Urine clx NTD.  unsteady Gait difficulty seems to have improved. Psychiatry consulted for delerium on dementia. Improved on seroquel bid .   Medically cleared for discharge back to [facility] with private hire Aide        Important Medication Changes and Reason:    Active or Pending Issues Requiring Follow-up:  > psychiatry follow up for dementia  Advanced Directives:   [ ] Full code  [ ] DNR  [ ] Hospice    Discharge Diagnoses:  Toxic metabolic encephalopathy  nolberto   colitis  delerium  Dementia   GERD  HLD  carotid stenosis  depression       HPI:  89 yo F with hx dementia GERD, HLD, carotid stenosis, depression p/w weakness and difficulty ambulating. Patient states she was walking when she felt like her legs were going to give out. She felt weak at the time and felt like she may fall so she sat down on a nearby couch. One of the people at the facility told her she should get checked out and called for her to come here. She states she just felt weak and denies chest pain, sob, lightheadness, dizziness when this occurred. It has never happened before. She felt better after she sat down on the couch. Currenltly no complaints and wants to go back home. does not know why she is here. no fevers, chills, palpitations, dysuria, diarrhea, constipation. some abdominal discomfort in lower area.  (06 Sep 2024 09:17)    Hospital Course:  Pt admitted with  gait difficulty. [facility] to get up at  6pm but felt dizzy and unable to ambulate. Denies vertigo/Dizziness here. -179/55-61, HR 60s-70s, RR18-20 oxygenating well on RA. FSBG 99. Exam without focal neurological deficits. Questionable monocular left eye superior nasal defect. Doesn't know age/month. Steady gait. Labs w/ sCR 1.42/BUN 32, Cl112, , iCal elevated 1.56, pCO2 51 w/ bicarb 24 and normal pH 7.35. Normal coags.  UA w/ trace LE. CTAP w/ left hydronephrosis, Mild wall thickening versus underdistention of the ascending through proximal sigmoid colon possibly c/w colitis. CTH w/ senescent and mild chronic microvascular changes. no new ischemic findings. CTA w/ left neck ICA Luminal irregularity suggesting FMD. +AURA, alk phos elevated . Neurology appreciated. r/o toxic metabolic encephalopathy in the setting of colitis and aura.    s/p course of cipro/flagyl for colitis. AURA improved with IVF. HTN controlled with addition of norvasc. Urine clx NTD.  Unsteady Gait difficulty seems to have improved. Psychiatry consulted for delirium on dementia. Improved on seroquel bid .   Medically cleared for discharge back to [facility] with private hire Aide.    Important Medication Changes and Reason:    Active or Pending Issues Requiring Follow-up:  > psychiatry follow up for dementia  Advanced Directives:   [ x ] Full code  [ ] DNR  [ ] Hospice    Discharge Diagnoses:  Toxic metabolic encephalopathy  aura   colitis  delerium  Dementia   GERD  HLD  carotid stenosis  depression

## 2024-09-11 NOTE — PROGRESS NOTE ADULT - ASSESSMENT
89yoRH-W with Depression, Dementia (Ox1 @Baseline, Ox2 here on Donepezil and Namenda) HLD, GERD, Constipation, Diverticulitis/IBS, carotid stenosis, not on daily antiplatelets presenting from assisted living w/ gait difficulty.Per [facility] pt tried to get up at  6pm but felt dizzy and unable to ambulate. Denies vertigo/Dizziness here. -179/55-61, HR 60s-70s, RR18-20 satting well on RA. FSBG 99. Exam without focal neurological deficits. Questionable monocular left eye superior nasal defect. Doesnt know age/month. Steady gait. Labs w/ sCR 1.42/BUN 32, Cl112, , iCal elevated 1.56, pCO2 51 w/ bicarb 24 and normal pH 7.35. Normal coags.  UA w/ trace LE. CTAP w/ left hydronephrosis, Mild wall thickening versus underdistention of the ascending through proximal sigmoid colon possibly c/w colitis. NCCTH w/ senescent and mild chronic microvascular changes. repeat CTH w/ CTA (delayed due to allergy/need for premedication )w/ no changes / no new ischemic findings. CTA w/ left neck ICA Luminal irregularity suggesting FMD. Fetal right PCA/   +AURA  alk phos elevated   o/e non focal AAOx1 MEDEIROS     Impression:   1) AMS/baseline dementia AAOx1 r/o toxic metabolic encephalopathy  2) unsteady Gait difficulty seems to have improved vs fluctuation iso abdominal pain with CTAP showing Mild wall thickening versus underdistention of the ascending through proximal sigmoid colon. Gait may have been limited by abdominal pain possibly from colitis in poor historian    Recommendations:  -PT Eval  - ASA if able.   - atorvastatin 40qhs   -Colitis / rest of care per primary team  -If symptoms recur can get MRI brain noncon  -b12, RPR, TSH if never checked   - carotid duplex in or outpatient    - TTE pending   - telemetry  - check FS, glucose control <180  - GI/DVT ppx   - plan to return to [facility]   Kolby Bunch MD  Vascular Neurology  Office: [phone] .   
89yoRH-W with Depression, Dementia (Ox1 @Baseline, Ox2 here on Donepezil and Namenda) HLD, GERD, Constipation, Diverticulitis/IBS, carotid stenosis, not on daily antiplatelets presenting from assisted living w/ gait difficulty.Per [facility] staff pt tried to get up at  6pm but felt dizzy and unable to ambulate. Denies vertigo/Dizziness here. -179/55-61, HR 60s-70s, RR18-20 satting well on RA. FSBG 99. Exam without focal neurological deficits. Questionable monocular left eye superior nasal defect. Doesnt know age/month. Steady gait. Labs w/ sCR 1.42/BUN 32, Cl112, , iCal elevated 1.56, pCO2 51 w/ bicarb 24 and normal pH 7.35. Normal coags.  UA w/ trace LE. CTAP w/ left hydronephrosis, Mild wall thickening versus underdistention of the ascending through proximal sigmoid colon possibly c/w colitis. NCCTH w/ senescent and mild chronic microvascular changes. repeat CTH w/ CTA (delayed due to allergy/need for premedication )w/ no changes / no new ischemic findings. CTA w/ left neck ICA Luminal irregularity suggesting FMD. Fetal right PCA/   +AURA  alk phos elevated   o/e non focal AAOx1 MEDEIROS     Impression:   1) AMS/baseline dementia AAOx1 r/o toxic metabolic encephalopathy  2) unsteady Gait difficulty seems to have improved vs fluctuation iso abdominal pain with CTAP showing Mild wall thickening versus underdistention of the ascending through proximal sigmoid colon. Gait may have been limited by abdominal pain possibly from colitis in poor historian    Recommendations:  -PT Eval  - ASA if able.   - atorvastatin 40qhs   -Colitis / rest of care per primary team  -If symptoms recur can get MRI brain noncon  -b12, RPR, TSH if never checked   - carotid duplex in or outpatient    - TTE pending   - telemetry  - check FS, glucose control <180  - GI/DVT ppx   - plan to return to [facility]   Kolby Bunch MD  Vascular Neurology  Office: [phone] .   
89yoRH-W with Depression, Dementia (Ox1 @Baseline, Ox2 here on Donepezil and Namenda) HLD, GERD, Constipation, Diverticulitis/IBS, carotid stenosis, not on daily antiplatelets presenting from assisted living w/ gait difficulty.[facility] pt tried to get up at  6pm but felt dizzy and unable to ambulate. Denies vertigo/Dizziness here. -179/55-61, HR 60s-70s, RR18-20 satting well on RA. FSBG 99. Exam without focal neurological deficits. Questionable monocular left eye superior nasal defect. Doesnt know age/month. Steady gait. Labs w/ sCR 1.42/BUN 32, Cl112, , iCal elevated 1.56, pCO2 51 w/ bicarb 24 and normal pH 7.35. Normal coags.  UA w/ trace LE. CTAP w/ left hydronephrosis, Mild wall thickening versus underdistention of the ascending through proximal sigmoid colon possibly c/w colitis. NCCTH w/ senescent and mild chronic microvascular changes. repeat CTH w/ CTA (delayed due to allergy/need for premedication )w/ no changes / no new ischemic findings. CTA w/ left neck ICA Luminal irregularity suggesting FMD. Fetal right PCA/   +AURA  alk phos elevated   o/e non focal AAOx1 MEDEIROS     Impression:   1) AMS/baseline dementia AAOx1 r/o toxic metabolic encephalopathy  2) unsteady Gait difficulty seems to have improved vs fluctuation iso abdominal pain with CTAP showing Mild wall thickening versus underdistention of the ascending through proximal sigmoid colon. Gait may have been limited by abdominal pain possibly from colitis in poor historian    Recommendations:  -PT Eval  - ASA if able.   - atorvastatin 40qhs   -Colitis / rest of care per primary team  -If symptoms recur can get MRI brain noncon  -b12, RPR, TSH if never checked   - carotid duplex in or outpatient    - telemetry  - check FS, glucose control <180  - GI/DVT ppx   - plan to return to [facility] Bunch MD  Vascular Neurology  Office: [phone] .

## 2024-09-11 NOTE — DISCHARGE NOTE NURSING/CASE MANAGEMENT/SOCIAL WORK - PATIENT PORTAL LINK FT
You can access the FollowMyHealth Patient Portal offered by [facility] by registering at the following website: [email or web address]. By joining [employer]’s FollowMyHealth portal, you will also be able to view your health information using other applications (apps) compatible with our system.

## 2024-09-11 NOTE — ED PROVIDER NOTE - OBJECTIVE STATEMENT
89 yo F with hx dementia GERD, HLD, carotid stenosis, depression p/w weakness and difficulty ambulating. Patient patient presents for fall  Patient was found down at living facility [facility] after fall.  Patient does not recall fall patient endorsing pain at right hip patient denies headache neck pain back pain shoulder pain arm pain leg pain.  Patient denies fevers chills nausea vomiting shortness of breath dysuria.

## 2024-09-11 NOTE — PROGRESS NOTE ADULT - SUBJECTIVE AND OBJECTIVE BOX
Neurology Progress Note    S: Patient seen and examined. No new events overnight.        Medications: MEDICATIONS  (STANDING):  amLODIPine   Tablet 5 milliGRAM(s) Oral daily  atorvastatin 10 milliGRAM(s) Oral at bedtime  ciprofloxacin     Tablet 500 milliGRAM(s) Oral every 12 hours  heparin   Injectable 5000 Unit(s) SubCutaneous every 8 hours  latanoprost 0.005% Ophthalmic Solution 1 Drop(s) Both EYES at bedtime  melatonin 5 milliGRAM(s) Oral at bedtime  memantine 10 milliGRAM(s) Oral two times a day  metroNIDAZOLE    Tablet 500 milliGRAM(s) Oral every 8 hours  mirtazapine Soltab 45 milliGRAM(s) Oral at bedtime  pantoprazole    Tablet 40 milliGRAM(s) Oral before breakfast  polyethylene glycol 3350 17 Gram(s) Oral daily  QUEtiapine 25 milliGRAM(s) Oral once  QUEtiapine 25 milliGRAM(s) Oral two times a day  senna 2 Tablet(s) Oral at bedtime  sertraline 50 milliGRAM(s) Oral daily  sodium chloride 0.9%. 1000 milliLiter(s) (60 mL/Hr) IV Continuous <Continuous>    MEDICATIONS  (PRN):  acetaminophen     Tablet .. 650 milliGRAM(s) Oral every 6 hours PRN Temp greater or equal to 38C (100.4F), Mild Pain (1 - 3)  aluminum hydroxide/magnesium hydroxide/simethicone Suspension 30 milliLiter(s) Oral every 4 hours PRN Dyspepsia  OLANZapine Injectable 2.5 milliGRAM(s) IntraMuscular every 8 hours PRN Aggitation  ondansetron   Disintegrating Tablet 4 milliGRAM(s) Oral every 8 hours PRN Nausea and/or Vomiting       Vitals:  Vital Signs Last 24 Hrs  T(C): 36.8 (11 Sep 2024 16:07), Max: 37.1 (10 Sep 2024 19:55)  T(F): 98.2 (11 Sep 2024 16:07), Max: 98.7 (10 Sep 2024 19:55)  HR: 71 (11 Sep 2024 16:07) (71 - 96)  BP: 102/57 (11 Sep 2024 16:07) (102/57 - 156/76)  BP(mean): --  RR: 18 (11 Sep 2024 16:07) (18 - 18)  SpO2: 92% (11 Sep 2024 16:07) (92% - 95%)    Parameters below as of 11 Sep 2024 16:07  Patient On (Oxygen Delivery Method): room air               General Exam:   General Appearance: Appropriately dressed and in no acute distress       Head: Normocephalic, atraumatic and no dysmorphic features  Ear, Nose, and Throat: Moist mucous membranes  CVS: S1S2+  Resp: No SOB, no wheeze or rhonchi  Abd: soft NTND  Extremities: No edema, no cyanosis  Skin: No bruises, no rashes     Neurological Exam:    - Mental Status:   level of consciousness: Awake, alert  Orientation: Oriented to person and place, not age (knows she is nearly 89 and knows her birthday but says would have to do math to know her age), not day/month, knows year. Does not know the current president/candidates says she stopped paying attention duie to her age  Language: Speech is fluent with intact naming, repetition, and ability to follow commands (including simple commands and complex cross commands)  Cortical Signs: No extinction to visual or tactile DSS, no hemineglect.     - Cranial Nerves:   PERRL, VFF OD, questionable monocular LEFT eye superior nasal defect, EOMI, facial sensation is intact in the V1-V3 distribution bilaterally; face is symmetric with normal smile; hearing is intact to finger rub bilaterally and equally; uvula is midline and soft palate rises symmetrically; shoulder shrug intact; tongue protrudes in the midline with intact lateral movements    - Motor Testing:  Bulk: Thin without focal atrophy  Strength:  There is no pronator drift b/l  There is no leg drift b/l                              Right           Left                        5                   5    - Sensory: Intact throughout to light touch.   - Coordination: Finger-nose-finger intact without dysmetria.    - Gait: slow but steady gait with short steps. No imbalance.     I personally reviewed the below data/images/labs:    LABS:      LABS:                          12.0   10.12 )-----------( 170      ( 10 Sep 2024 06:59 )             37.2     09-10    140  |  107  |  23  ----------------------------<  107<H>  4.0   |  21<L>  |  1.14    Ca    11.3<H>      10 Sep 2024 06:59  Phos  2.2     09-10  Mg     2.2     09-10          Urinalysis Basic - ( 10 Sep 2024 06:59 )    Color: x / Appearance: x / SG: x / pH: x  Gluc: 107 mg/dL / Ketone: x  / Bili: x / Urobili: x   Blood: x / Protein: x / Nitrite: x   Leuk Esterase: x / RBC: x / WBC x   Sq Epi: x / Non Sq Epi: x / Bacteria: x        CT Head No Cont:  (05 Sep 2024 21:28)    < from: CT Head No Cont (09.05.24 @ 21:28) >    ACC: [id number] EXAM:  CT BRAIN   ORDERED BY:  KRYS SIDHU     PROCEDURE DATE:  09/05/2024          INTERPRETATION:  CLINICAL INFORMATION: Code stroke, difficulty ambulating    COMPARISON: CT head 1/11/2022    CONTRAST:  IV Contrast: NONE  Complications: None reported at time of study completion    TECHNIQUE:  Serial axial images were obtained from the skull base to the   vertex using multi-slice helical technique. Sagittal and coronal   reformats were obtained.    FINDINGS:    VENTRICLES AND SULCI: Age appropriate involutional changes.  INTRA-AXIAL: No acute hemorrhage, vasogenic edema or evidence for acute   territorial infarct.  There are stable periventricular and subcortical   white matter hypodensities, consistent with microvascular type changes.  EXTRA-AXIAL: No mass or fluid collection. Basal cisterns are clear.  VISUALIZED SINUSES:  Foamy secretions left sphenoid sinus. The remaining   clear.  TYMPANOMASTOID CAVITIES:  Clear.  VISUALIZED ORBITS: Bilateral lens replacement.  CALVARIUM: Intact.    MISCELLANEOUS: None.      IMPRESSION:  Stable head CT. No acute intracranial hemorrhage, vasogenic edema,   extra-axial collection or hydrocephalus. Mild chronic microvascular   changes.    Findings were discussed with Dr. Lopez at9/5/2024 9:14 PM by Dr. Palomino   with read back confirmation.    --- End of Report ---           AMIE PALOMINO MD; Resident Radiologist  This document has been electronically signed.  PRATIMA WETZEL MD; Attending Radiologist  This document has been electronically signed. Sep  5 2024  9:32PM    < end of copied text >      < from: CT Angio Head w/ IV Cont (09.06.24 @ 02:53) >    ACC: [id number] EXAM:  CT ANGIO NECK (W)AW IC   ORDERED BY:  KRYS SIDHU     ACC: [id number] EXAM:  CT ANGIO BRAIN (W)AW IC   ORDERED BY:  KRYS SIDHU     PROCEDURE DATE:  09/06/2024          INTERPRETATION:  CLINICAL INFORMATION: Code stroke, gait difficulty    COMPARISON: CT head 9/5/2024    CONTRAST:  IV Contrast: Omnipaque 350 (accession [id number]), IV contrast documented   in unlinked concurrent exam (accession [id number])  90 cc administered  10   cc discarded  Complications: None reported at time of study completion    TECHNIQUE:  CT BRAIN: Serial axial images were obtained from the skull base to the   vertex without the use of contrast.    CTA NECK/HEAD: After the intravenous power injection of non-ionic   contrast material, serialthin sections were obtained through the   cervical and intracranial circulation on a multislice CT scanner. Axial,   Coronal and Sagittal MIP reformatted images were obtained. 3D   reconstruction was also performed.    FINDINGS:    CT BRAIN:  VENTRICLES AND SULCI: Age appropriate involutional changes.  INTRA-AXIAL: No acute hemorrhage, vasogenic edema or signs for acute   territorial infarct.  There are periventricular and subcortical white   matter hypodensities, consistent with microvascular type changes.  EXTRA-AXIAL: No mass or fluid collection. Basal cisterns are clear.  VISUALIZED SINUSES:  Foamy secretions in the left sphenoid sinus.  TYMPANOMASTOID CAVITIES:  Clear.  VISUALIZED ORBITS: Bilateral lens replacement.  CALVARIUM: Intact.    MISCELLANEOUS: None.      CTA NECK:    AORTIC ARCH AND VISUALIZED GREAT VESSELS: Within normal limits.    RIGHT:  COMMON CAROTID ARTERY: No significant stenosis to the carotid   bifurcation. Tortuous course proximally.  INTERNAL CAROTID ARTERY: No significant stenosis based on NASCET criteria.  VERTEBRAL ARTERY: Normal in caliber to the intracranial circulation.   Tortuous course.    LEFT:  COMMON CAROTID ARTERY: No significant stenosis to the carotid   bifurcation. Tortuous course proximally.  INTERNAL CAROTID ARTERY: No significant stenosis based on NASCET   criteria. Luminal irregularity with a subacute appearance suggesting   underlying FMD.  VERTEBRAL ARTERY: Normal in caliber to the intracranial circulation.   Tortuous course.    VISUALIZED LUNGS: Limited evaluation due to respiratory motion.    MISCELLANEOUS: None.    CAROTID STENOSIS REFERENCE: Percent (%) stenosis is expressed in terms of   NASCET Criteria. (NASCET = 100x1-(N/D)). N=greatest narrowing. D=normal   distal diameter- MILD = <50% stenosis. - MODERATE = 50-69% stenosis. -   SEVERE = 70-89% stenosis. - HAIRLINE/CRITICAL = 90-99% stenosis. -   OCCLUDED = 100% stenosis.      CTA HEAD:  INTERNAL CAROTID ARTERIES: Bilateral petrous, precavernous, cavernous,   and supraclinoid regions are patent without significant stenosis.  [place] OF POND: No aneurysm identified. Tiny aneurysms can be beyond   the resolution of CTA technique.  Fetal origin of the right PCA.  ANTERIOR CEREBRAL ARTERIES: No significant stenosis or occlusion.  MIDDLE CEREBRAL ARTERIES: No significant stenosis or occlusion.  POSTERIOR CEREBRAL ARTERIES: No significant stenosis or occlusion.  DISTAL VERTEBRAL / BASILAR ARTERIES: No significant stenosis or occlusion.    VENOUS STRUCTURES: Visualized superficial and deep venous structures   appear patent.  MISCELLANEOUS: No other vascular abnormality is seen.      IMPRESSION:  CT HEAD: No acute intracranial finding. No significant interval change   from most recent head CT.  CTA NECK: No vessel occlusion, hemodynamically significant stenosis or   dissection. 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  CTA HEAD: No large vessel occlusion, significant stenosis or vascular   abnormality identified.        --- End of Report ---

## 2024-09-11 NOTE — ED ADULT NURSE NOTE - CCCP TRG CHIEF CMPLNT
ASSESSMENT  43 y o   sexually active female p/w 1 month h/o RLQ "popping sensation" on cough/sneeze  PLAN  RLQ Popping sound: abdominal u/s to r/o hernia  R pelvic pain on deep palpation: pelvic u/s  BV: Rx flagyl sent; instructed to avoid alcohol    SUBJECTIVE  Pt underwent TVH, BS for AUB on 18  She reports no post-operative sx  Then, 4 weeks ago, she suddenly started to experience a "popping sensation" on cough/sneeze  She contacted her PCP and asked her if she should come in to see her PCP or f/u with Gyn clinic  The PCP told her to f/u with Gyn clinic  OBJECTIVE  Vitals:    18 1613   BP: 99/69   Pulse: 73         Physical exam:  Neuro: AAOx3      Abdomen:    Rebound: no   Guarding: no   RLQ tenderness to palpation: yes;     the site of "popping" sensation and the site of tenderness are 2 different locations;     the former is superolateral to the latter  Auscultation of site of "popping" sensation revealed bowel sounds that were equal in intensity vs the contralateral point on the left   RUQ tenderness to palpation: no   LLQ tenderness to palpation: no   LUQ tenderness to palpation: no    Genitourinary:      Inspection:     Labia minora:       Atrophy: no     Erythema: no     Exudate: no     Discharge: no     Warts: no     Rash: no     Speculum exam:     Vagina:     Vaginal mucopurulent discharge: no     Warts: no     Blood in posterior vault: no     Purulent fluid in posterior vault: no     Smoothening of rugae present: no     Cuff: intact     Pelvic exam:    Suprapubic tenderness to palpation: no    Cuff: intact     Adnexae    L:     Size: unremarkable      Tender to palpation: no     Palpable mass(es): no    R:     Size: unremarkable      Tender to palpation: no     Palpable mass(es): no      Microscopy:   1 drop of normal saline applied to slide prepared from swab collected from vaginal sidewall fluid:    No motile trichomonads visualized when viewed at 40x power on Brightfield microscopy      Amsel's criteria: 4 /4     presence of greyish-white, thin, homogenous discharge that smoothly coats the vaginal walls     pH 5 5     Pos Whiff test     >20% clue cells when viewed at 40x power on Brightfield microscopy     1 drop of KOH applied to slide prepared from swab collected from vaginal sidewall fluid revealed  an absence of pseudohyphae when viewed at 40x power on Brightfield microscopy fall

## 2024-09-11 NOTE — DISCHARGE NOTE PROVIDER - CARE PROVIDER_API CALL
Aurelio Tyler  Gastroenterology  [address] [facility], [address]  [place] [address]  Phone: [phone]  Fax: [phone]  Follow Up Time: 1 week   Aurelio Tyler  Gastroenterology  [address] [facility], [address]  [place] [address]  Phone: [phone]  Fax: [phone]  Follow Up Time: 1 week    Riki Robledo  Internal Medicine  [address] [place] [address], [address]  [place] [address]  Phone: [phone]  Fax: [phone]  Established Patient  Follow Up Time: 1 week

## 2024-09-11 NOTE — BH CONSULTATION LIAISON PROGRESS NOTE - NSBHCONSULTRECOMMENDOTHER_PSY_A_CORE FT
-zyprexa prn as above  -monitor ecg, oversedation  -b12, rpr, tsh if not done  -no indication for psych 1:1 or admission  -Dementia/Delirium precautions     -Minimize lines/tethers when possible, disconnect/discontinue if safe and not in use     -Ensure patient has and uses hearing aids, glasses, and dentures during the day     -Normalize day-night cycle, lights on/blinds open during the day, minimize disturbances at night (lights off/blinds closed/TV off at night)     -Avoid deliriogenic medications if possible (benzodiazepines, non-opioid pain control when possible, minimize anticholinergic burden)     -Out of bed to chair if possible during the day      -Frequent reorientation, instruct family/visitors to reorient patient      -Ensure patient is voiding/having regular bowel movements
inc seroquel to 50mg po qhs 
-zyprexa prn as above  -monitor ecg, oversedation  -b12, rpr, tsh if not done  -no indication for psych 1:1 or admission  -Dementia/Delirium precautions     -Minimize lines/tethers when possible, disconnect/discontinue if safe and not in use     -Ensure patient has and uses hearing aids, glasses, and dentures during the day     -Normalize day-night cycle, lights on/blinds open during the day, minimize disturbances at night (lights off/blinds closed/TV off at night)     -Avoid deliriogenic medications if possible (benzodiazepines, non-opioid pain control when possible, minimize anticholinergic burden)     -Out of bed to chair if possible during the day      -Frequent reorientation, instruct family/visitors to reorient patient      -Ensure patient is voiding/having regular bowel movements

## 2024-09-11 NOTE — DISCHARGE NOTE PROVIDER - NSDCMRMEDTOKEN_GEN_ALL_CORE_FT
amLODIPine 5 mg oral tablet: 1 tab(s) orally once a day  atorvastatin 10 mg oral tablet: 1 tab(s) orally once a day (at bedtime)  ciprofloxacin 500 mg oral tablet: 1 tab(s) orally every 12 hours  docusate sodium 100 mg oral capsule: 1 cap(s) orally once a day  Hydrocortisone acetate 25mg suppository rectal: 1 suppository rectally 2 times a day as needed for hemorrhoids  latanoprost 0.005% ophthalmic solution: 1 drop(s) to each affected eye once a day (in the evening)  melatonin 5 mg oral tablet: 1 tab(s) orally once a day (at bedtime)  memantine 10 mg oral tablet: 1 tab(s) orally every 12 hours  metroNIDAZOLE 500 mg oral tablet: 1 tab(s) orally every 8 hours  mirtazapine 45 mg oral tablet, disintegratin tab(s) orally once a day (at bedtime)  omeprazole 20 mg oral delayed release tablet: 1 tab(s) orally once a day before breakfast  PreserVision AREDS 2 oral capsule: 1 cap(s) orally once a day  QUEtiapine 25 mg oral tablet: 1 tab(s) orally 2 times a day  senna leaf extract oral tablet: 2 tab(s) orally once a day (at bedtime) HOLD FOR LOOSE STOOLS  sertraline 50 mg oral tablet: 1 tab(s) orally once a day  Tylenol Extra Strength 500 mg oral tablet: 1 tab(s) orally 3 times a day  Vitamin D3 50 mcg (2000 intl units) oral tablet: 1 tab(s) orally once a day

## 2024-09-11 NOTE — PROVIDER CONTACT NOTE (OTHER) - BACKGROUND
PMH dementia and major depression
PMH dementia, cancer
89 y/o female with history of depression, dementia, HLD, GERD, admitted with noninfectious gastroenteritis.
89 y/o female with history of depression, dementia, HLD, GERD, admitted with noninfectious gastroenteritis.
Pt refusing telemetry, medication and blood work. Several attempts were made to reassure individual and educate on the risk of not adhering to medication. Provider is aware of patient refusal.

## 2024-09-11 NOTE — CONSULT NOTE ADULT - ATTENDING COMMENTS
Constipation without impaction  continue bowel regimen  no plans for any gi intervention  ok to dc home
DOS 9/6  code stroke called on arrival and neurology emergently assessed patient.   not tnk or evt candidate  Radha   Assessment:  89yoRH-W with Depression, Dementia (Ox1 @Baseline, Ox2 here on Donepezil and Namenda) HLD, GERD, Constipation, Diverticulitis/IBS, carotid stenosis, not on daily antiplatelets presenting from assisted living w/ gait difficulty.[facility] to get up at  6pm but felt dizzy and unable to ambulate. Denies vertigo/Dizziness here. -179/55-61, HR 60s-70s, RR18-20 satting well on RA. FSBG 99. Exam without focal neurological deficits. Questionable monocular left eye superior nasal defect. Doesnt know age/month. Steady gait. Labs w/ sCR 1.42/BUN 32, Cl112, , iCal elevated 1.56, pCO2 51 w/ bicarb 24 and normal pH 7.35. Normal coags.  UA w/ trace LE. CTAP w/ left hydronephrosis, Mild wall thickening versus underdistention of the ascending through proximal sigmoid colon possibly c/w colitis. NCCTH w/ senescent and mild chronic microvascular changes. repeat CTH w/ CTA (delayed due to allergy/need for premedication )w/ no changes / no new ischemic findings. CTA w/ left neck ICA Luminal irregularity suggesting FMD. Fetal right PCA/   +AURA  alk phos elevated   o/e non focal AAOx1 MEDIEROS     Impression:   1) AMS/baseline dementia AAOx1 r/o toxic metabolic encephalopathy  2) unsteady Gait difficulty seems to have improved vs fluctuation iso abdominal pain with CTAP showing Mild wall thickening versus underdistention of the ascending through proximal sigmoid colon. Gait may have been limited by abdominal pain possibly from colitis in poor historian    Recommendations:  -PT Eval  - ASA and atorvastatin 40qhs   -Colitis / rest of care per primary team  -If symptoms recur can get MRI brain noncon  -b12, RPR, TSH if never checked   - carotid duplex in or outpatient    - TTE  - telemetry  - check FS, glucose control <180  - GI/DVT ppx  - Counseling on diet, exercise, and medication adherence was done  - Counseling on smoking cessation and alcohol consumption offered when appropriate.  - Pain assessed and judicious use of narcotics when appropriate was discussed.    - Stroke education given when appropriate.  - Importance of fall prevention discussed.   - Differential diagnosis and plan of care discussed with patient and/or family and primary team  - Thank you for allowing me to participate in the care of this patient. Call with questions.   Kolby Bunch MD  Vascular Neurology  Office: [phone]

## 2024-09-11 NOTE — ED PROVIDER NOTE - CLINICAL SUMMARY MEDICAL DECISION MAKING FREE TEXT BOX
Patient concerning for mechanical fall will obtain CT head and neck x-ray hip basic labs.  Will likely admit patient due to safety concerns Patient concerning for mechanical fall will obtain CT head and neck x-ray hip basic labs.  Will likely admit patient due to safety concerns    see attending attestation authored by me, Abdi Mendoza MD, for further MDM details.

## 2024-09-11 NOTE — DISCHARGE NOTE PROVIDER - NSCORESITESY/N_GEN_A_CORE_RD
Patient : Israel Madison Age: 73 year old Sex: male   MRN: [id number] Encounter Date: 1/8/2018  Room: /A     History     Chief Complaint   Patient presents with   • Shortness of Breath       HPI    4:20 PM Israel Madison is a 73 year old male with a hx of IBS who presents to ED via triage with a chief complaint of gradually worsening SOB with minimal exertion that started one week prior to arrival. Israel also c/o intermittent dry cough and generalized weakness/fatigue with minimal exertion. He was evaluated by Dr. Bhatt today, who advised that he received further evaluation in the ED. Pt reports he has been taking furosemide as prescribed. It was recommended at his appointment today that the pt doubled his dose of furosemide for the next few days. Pt wears compression stockings on BLE and daughter has noticed mild increase in swelling. He denies CP, vomiting, recent long trips/travel. There are no other associated symptoms or modifying factors at this time.     Review of Records  I reviewed the pt's medications, allergies, and past medical and surgical history in Epic. Pt was seen in his PCP's office earlier today and had lab work and CXR performed.    1/8/17 CXR FINDINGS:   Postop CABG/sternotomy changes are stable.  The cardiac silhouette remains mildly enlarged.   Coarsened interstitial and central vascular markings are present consistent  with passive venous congestion. Basal atelectatic changes are present.   No significant pleural effusions or consolidations are noted.    BNP Component Results    Component Value Ref Range & Units Status Collected Lab   B-TYPE NATRIURETIC PEPTIDE 331   <100 pg/mL Final 01/08/2018 12:49 PM ACL     Ultra sensitive Troponin Component Results     Component Value Ref Range & Units Status Collected Lab   TROPONIN I 0.18   <0.05 ng/mL Final 01/08/2018 12:49 PM ACL     CMP Component Results     Component Value Ref Range & Units Status Collected Lab   Fasting Status 3  hrs Final  01/08/2018 12:49 PM ACL   Sodium 142  135 - 145 mmol/L Final 01/08/2018 12:49 PM ACL   Potassium 5.0  3.4 - 5.1 mmol/L Final 01/08/2018 12:49 PM ACL   Chloride 107  98 - 107 mmol/L Final 01/08/2018 12:49 PM ACL   Carbon Dioxide 29  21 - 32 mmol/L Final 01/08/2018 12:49 PM ACL   Anion Gap 11  10 - 20 mmol/L Final 01/08/2018 12:49 PM ACL   Glucose 81  65 - 99 mg/dL Final 01/08/2018 12:49 PM ACL   BUN 24   6 - 20 mg/dL Final 01/08/2018 12:49 PM ACL   Creatinine 0.96  0.67 - 1.17 mg/dL Final 01/08/2018 12:49 PM ACL   GFR Estimate,  >90   Final 01/08/2018 12:49 PM ACL   Comment:   eGFR results = or >90 mL/min/1.73m2 = Normal kidney function.   GFR Estimate, Non  78   Final 01/08/2018 12:49 PM ACL   Comment:   eGFR 60 - 89 mL/min/1.73m2 = Mild decrease in kidney function.   BUN/Creatinine Ratio 25  7 - 25 Final 01/08/2018 12:49 PM ACL   CALCIUM 8.7  8.4 - 10.2 mg/dL Final 01/08/2018 12:49 PM ACL   TOTAL BILIRUBIN 0.8  0.2 - 1.0 mg/dL Final 01/08/2018 12:49 PM ACL   AST/SGOT 27  <38 Units/L Final 01/08/2018 12:49 PM ACL   ALT/SGPT 26  <79 Units/L Final 01/08/2018 12:49 PM ACL   ALK PHOSPHATASE 62  45 - 117 Units/L Final 01/08/2018 12:49 PM ACL   TOTAL PROTEIN 6.7  6.4 - 8.2 g/dL Final 01/08/2018 12:49 PM ACL   Albumin 2.9   3.6 - 5.1 g/dL Final 01/08/2018 12:49 PM ACL   GLOBULIN 3.8  2.0 - 4.0 g/dL Final 01/08/2018 12:49 PM ACL   A/G Ratio, Serum 0.8   1.0 - 2.4 Final 01/08/2018 12:49 PM ACL     CBC Component Results     Component Value Ref Range & Units Status Collected Lab   WBC 5.3  4.2 - 11.0 K/mcL Final 01/08/2018 12:49 PM LOOM   RBC 3.68   4.50 - 5.90 mil/mcL Final 01/08/2018 12:49 PM LOOM   HGB 10.8   13.0 - 17.0 g/dL Final 01/08/2018 12:49 PM LOOM   HCT 35.4   39.0 - 51.0 % Final 01/08/2018 12:49 PM LOOM   MCV 96.2  78.0 - 100.0 fl Final 01/08/2018 12:49 PM LOOM   MCH 29.3  26.0 - 34.0 pg Final 01/08/2018 12:49 PM LOOM   MCHC 30.5   32.0 - 36.5 g/dL Final 01/08/2018 12:49 PM LOOM    RDW-CV 15.2   11.0 - 15.0 % Final 01/08/2018 12:49 PM LOOM     140 - 450 K/mcL Final 01/08/2018 12:49 PM LOOM   DIFF TYPE   Final 01/08/2018 12:49 PM LOOM   AUTOMATED DIFFERENTIAL    Lymph 23  % Final 01/08/2018 12:49 PM LOOM   Mid Cells 14  % Final 01/08/2018 12:49 PM LOOM   Neutrophil 63  % Final 01/08/2018 12:49 PM LOOM   Absolute Lymph 1.2  1.0 - 4.0 K/mcL Final 01/08/2018 12:49 PM LOOM   Absolute Mid 0.7  0.1 - 1.1 K/mcL Final 01/08/2018 12:49 PM LOOM   Absolute Neutrophil 3.4  1.8 - 7.7 K/mcL Final 01/08/2018 12:49 PM LOOM   Hypochromia FEW   Final 01/08/2018 12:49 PM LOOM       PCP: Asif Bhatt DO       Allergies   Allergen Reactions   • Cat Dander        Prior to Admission Medications    ACYCLOVIR (ZOVIRAX) 400 MG TABLET    Take 400 mg by mouth 2 times daily.    ALBUTEROL 108 (90 BASE) MCG/ACT INHALER    Inhale 2 puffs into the lungs four times daily.    ALUMINUM CHLORIDE (DRYSOL) 20 % TOPICAL SOLUTION    Apply at bedtime, wash off in morning, maintenance once or twice a week    ASPIRIN 81 MG TABLET    Take 81 mg by mouth nightly.     BLOOD GLUCOSE (GURU CONTOUR NEXT TEST) TEST STRIP    Test blood sugar 4 times daily as directed. Diagnosis: E11.65. Meter: Countour Next Link, Medtronic 670g pump    BUPROPION (WELLBUTRIN XL) 150 MG 24 HR TABLET    TAKE 1 TABLET DAILY    CHOLECALCIFEROL (VITAMIN D) 2000 UNITS TABLET    Take 2,000 Units by mouth daily.    CIPROFLOXACIN (CIPRO) 0.3 % OPHTHALMIC OINTMENT    4 times daily.    CLOPIDOGREL (PLAVIX) 75 MG TABLET    TAKE 1 TABLET DAILY    DIPHENOXYLATE-ATROPINE (LOMOTIL) 2.5-0.025 MG TABLET    Take 1 tablet by mouth 4 times daily as needed for Diarrhea.    FUROSEMIDE (LASIX) 40 MG TABLET    TAKE 1 TABLET TWICE A DAY    HYOSCYAMINE (HYOMAX SR) 0.375 MG 12 HR TABLET    TAKE 1 TABLET TWICE A DAY.    INSULIN SYRINGE-NEEDLE U-100 31G X 5/16\" 1 ML MISC    Use 6 syringes daily    LOSARTAN (COZAAR) 25 MG TABLET    Take 0.5 tablets by mouth daily.    LOVASTATIN  (MEVACOR) 40 MG TABLET    TAKE 2 TABLETS DAILY    METOPROLOL (TOPROL-XL) 50 MG 24 HR TABLET    TAKE 1 TABLET DAILY    NIACIN (NIASPAN) 1000 MG CR TABLET    TAKE 1 TABLET DAILY    PANTOPRAZOLE (PROTONIX) 40 MG TABLET    TAKE 1 TABLET DAILY    PREDNISOLONE ACETATE (PRED FORTE, OMNIPRED) 1 % OPHTHALMIC SUSPENSION    Place 1 drop into right eye daily.    SERTRALINE (ZOLOFT) 50 MG TABLET    Take 1 tablet by mouth daily.    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  great toe area;foot/diabetic foot ulcer   • Gall stones    • Gastroesophageal reflux disease    • Hypercholesterolemia    • Hypogonadism male    • Inflammatory bowel disease    • Lymphedema     chronic, bilateral    • Morbid obesity (CMS/HCC)    • SOFIA (obstructive sleep apnea)     no machine   • PVD (peripheral vascular disease) (CMS/[facility])    • Seasonal allergies    • Silent ischemia    • SOB (shortness of breath) 8/2014    with and without exertion       Past Surgical History:   Procedure Laterality Date   • BARIATRIC SURGERY  2007    Gastric bypass ([facility] notes)   • CARDIAC CATHERIZATION  2007;2014   • CARDIAC SURGERY  2007    CABG   • COLONOSCOPY      x2   • EYE SURGERY Bilateral 2015    Cataract surg. w IOL ([facility] notes)   • FEMORAL BYPASS Right 05/2016    distal   • GRAFT VASCULAR Right     Arterial graft right knee popliteal artery   • LOWER EXTREMITY ANGIOGRAMS/POSSIBLE PTA/POSSIBLE STENT  06/29/2017   • ORAL SURGERY PROCEDURE   2011   • PTCA  08/2014    stent   • TOE AMPUTATION Right 05/2016    Rt. 4th toe       Family History   Problem Relation Age of Onset   • Stroke Mother    • Diabetes Father    • Heart disease Father        Social History   Substance Use Topics   • Smoking status: Former Smoker     Packs/day: 1.50     Years: 20.00     Types: Cigarettes     Quit date: 1/10/1988   • Smokeless tobacco: Former User   • Alcohol use No       Review of Systems   Constitutional: Positive for fatigue. Negative for chills and fever.   HENT: Negative for congestion, ear pain, rhinorrhea and sore throat.    Eyes: Negative for discharge and redness.   Respiratory: Positive for cough (intermittent dry cough) and shortness of breath. Negative for chest tightness.    Cardiovascular: Positive for leg swelling. Negative for chest pain.   Gastrointestinal: Negative for abdominal pain, constipation, diarrhea, nausea and vomiting.   Genitourinary: Negative for difficulty urinating, dysuria and frequency.   Skin: Negative for rash.   Neurological: Positive for weakness (generalized). Negative for dizziness and headaches.   Psychiatric/Behavioral: Negative.        Physical Exam     ED Triage Vitals   ED Triage Vitals Group      Temp 01/08/18 1559 98.3 °F (36.8 °C)      Pulse 01/08/18 1559 78      Resp 01/08/18 1559 18      BP 01/08/18 1559 120/57      SpO2 01/08/18 1559 91 %      EtCO2 mmHg --       Height 01/08/18 1559 5' 10\" (1.778 m)      Weight 01/08/18 1559 (!) 320 lb (145.2 kg)      Weight Scale Used 01/09/18 0012 Scale in bed       Physical Exam   Constitutional: He is oriented to person, place, and time. He appears well-developed and well-nourished.   HENT:   Head: Normocephalic.   Eyes: Conjunctivae and EOM are normal. Right eye exhibits no discharge. Left eye exhibits no discharge. No scleral icterus.   Neck: Normal range of motion. Neck supple.   Cardiovascular: Normal rate, regular rhythm, normal heart sounds and intact distal pulses.     Pulmonary/Chest: Effort normal and breath sounds normal. No respiratory distress. He has no wheezes.   Abdominal: Soft. Bowel sounds are normal. He exhibits no distension. There is no tenderness.   Musculoskeletal: Normal range of motion. He exhibits edema (1+ BLE edema).   Neurological: He is alert and oriented to person, place, and time. GCS eye subscore is 4. GCS verbal subscore is 5. GCS motor subscore is 6.   Skin: Skin is warm and dry. No rash noted.   Psychiatric: He has a normal mood and affect.   Nursing note and vitals reviewed.      ED Course     Procedures    Lab Results     Results for orders placed or performed during the hospital encounter of 01/08/18   Troponin I Ultra Sensitive   Result Value Ref Range    TROPONIN I 0.19 (HH) <0.05 ng/mL   D Dimer Quantitative   Result Value Ref Range    D Dimer Quantitative 0.76 (H) <0.57 mg/L (FEU)   Partial Thromboplastin Time   Result Value Ref Range    PTT 30 22 - 30 sec   Prothrombin Time   Result Value Ref Range    PROTIME 11.8 9.7 - 11.8 sec    INR 1.1    Metered blood glucose   Result Value Ref Range    Glucose Bedside  (H) 65 - 99 mg/dL       EKG Results     EKG Interpretation  Rate: 78  Rhythm: normal sinus rhythm   Abnormality: TWI and ST depression in lead 1 and AVL. Subtle ST elevation in lead 3. More pronounced compared to 5/1/16.    EKG interpreted by ED physician    Radiology Results     Imaging Results          NM Lung Scan (Final result)  Result time 01/08/18 19:55:54    Final result                 Impression:    IMPRESSION: Low probability for pulmonary embolism.               Narrative:      VENTILATION/PERFUSION LUNG SCINTIGRAPHY    DATE OF EXAM: 1/8/2018 7:18 PM    ORDERING PHYSICIAN: GRZEGORZ PRIETO    CLINICAL INDICATION: 73 years Male with elevated d-dimer.    COMPARISON: Chest x-ray dated 1/8/2018.    TECHNIQUE: Ventilation and perfusion images of the lungs were obtained in 8  different projections after inhalation of 42.4 mCi of  aerosolized Tc99m  DTPA and IV administration of 5.3 mCi Tc99m MAA, respectively.    FINDINGS: Very small matched defect involving the posterior segment left  upper lobe. Mildly heterogeneous radiotracer activity throughout both lungs  on both the ventilation and perfusion lung images. There are no segmental  or subsegmental mismatched perfusion defects to suggest pulmonary embolism.                                  ED Medication Orders     Start Ordered     Status Ordering Provider    01/08/18 1758 01/08/18 1757  furosemide (LASIX) injection 40 mg  ONCE      Last MAR action:  Given GRZEGORZ PRIETO    01/08/18 1632 01/08/18 1631  aspirin chewable 324 mg  ONCE      Last MAR action:  Given GRZEGORZ PIRETO          MDM  Vitals  Vitals:    01/08/18 2230 01/08/18 2323 01/08/18 2330 01/09/18 0012   BP: 150/66 158/71 148/71 163/72   Pulse: 74 80 76 86   Resp: 18 22 17 22   Temp:    98 °F (36.7 °C)   TempSrc:    Oral   SpO2: 92% 93% 92% 93%   Weight:    (!) 141.2 kg   Height:           ED Course    4:30 PM Consult: I spoke with Dr. Fisher (24/7 Cardiologist) regarding the patient's presentation with elevated troponin, elevated BNP, and EKG changes compared to prior with more pronounced ST depression and TWI in 1 and AVL and subtle elevation in lead 3. He will review the EKG and call me back.     4:44 PM Consult: Dr. Fisher reviewed EKG. We discussed pt's presentation with worsening dyspnea on exertion for the past week. He does not feel pt requires emergent catheterization at this time.    5:54 PM Consult: I spoke with Dr. Gresham (Cardiology) regarding the patient's presentation from PCP's office today with worsening AVILA and fatigue without CP. We discussed results from PCP's office including ultra sensitive troponin of 0.18, venous congestion on CXR, and BNP at 300. I informed him of changes on EKG performed in the ED today and my discussion with Dr. Fisher, who did not feel emergent cath was indicated. We further discussed  ED results, including repeat ultra sensitive at 0.19 and elevated D-dimer. He agrees with the plan for diuresis and VQ and does not recommend heparin. He requests to be placed on consult.    6:10 PM Recheck: I rechecked the patient, who states sx have improved. I updated the pt on his test results, which are stable compared to earlier today. We discussed the new findings on his EKG as well as my discussion with Cardiology. I also informed him of elevated D-dimer and plan for VQ. Pt agrees and all questions were addressed.    7:11 PM Consult: I spoke with Dr. Jones for Dr. Bhatt, who advises that I contact Dr. Bradshaw.    7:29 PM Consult: I spoke with Dr. Bradshaw for Dr. Bhatt (PCP.) He states Dr. Bhatt is taking his own patients tonight. He will speak with Dr. Bhatt and have him call me.    7:32 PM Consult: I spoke with Dr. Bhatt regarding pt's test results in the ED. I informed him that troponin has remained stable and D-dimer was elevated. I informed him that pt is currently at VQ. We further discussed Dr. Fisher and Dr. Gresham's recommendations. He agrees with the plan and asks if pt's VQ scan is positive that we start on heparin drip.    7:42 PM Dr. Bhatt requests that I consult Endocrinology as he does not have experience managing an insulin pump.    7:58 PM Consult: I spoke with Dr. Lobo Pool (Endocrinology) regarding the patient's case. She will consult on the pt regarding insulin pump and management of blood sugars. She recommends continuing to use the insulin pump.    Not a transfer candidate secondary to tertiary care needs.    [facility]    Critical Care time spent on this patient outside of billable procedures:  31 minutes.    Admit  Clinical Impression:  ED Diagnoses        Final diagnoses    AVILA (dyspnea on exertion)     Elevated troponin            Pt to be admitted to Dr. Bhatt in serious condition.    ________________________________________________________________    I have reviewed  the information recorded by the scribe for accuracy and agree with its contents.    Agustin Loving acting as a scribe for Dr. Alexia George     Physician: Alexia George MD         IDX No: [id number]  Scribe: Agustin George MD  01/09/18 0031     No

## 2024-09-11 NOTE — DISCHARGE NOTE PROVIDER - NSDCFUADDAPPT_GEN_ALL_CORE_FT
APPTS ARE READY TO BE MADE: [x] YES    Best Family or Patient Contact (if needed):    Additional Information about above appointments (if needed):    1:   2:   3:     Other comments or requests:    APPTS ARE READY TO BE MADE: [x] YES    Best Family or Patient Contact (if needed): Pt's daughter.     Additional Information about above appointments (if needed):    1:   2:   3:     Other comments or requests:

## 2024-09-11 NOTE — BH CONSULTATION LIAISON PROGRESS NOTE - NSBHFUPINTERVALCCFT_PSY_A_CORE
I nee dto go home 
 I nee dto go home 
"I just want to go home. They have imprisoned me here and won't let me go."

## 2024-09-11 NOTE — BH CONSULTATION LIAISON PROGRESS NOTE - NSBHASSESSMENTFT_PSY_ALL_CORE
89yo female,  w/ 2 adult daughters, retired, domiciled at 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 Psychiatry consulted for agitation (pt reportedly wandering around the ER, going to the nurses' station and banging on the counter, yelling.    Pt evaluated at bedside, found sitting calmly and eating lunch. Per chart and pt, she was walking normally at the NH when she felt like her legs were going to suddenly give out, so she made her way to a nearby couch and sat down. Staff at the NH felt pt should be evaluated so she was sent to the ED. This weakness has since resolved and pt has been ambulating around the ED without incident (though notably she's desatting to the 80s). Pt now being given abx for acute colitis and being worked up for AHRF and ?syncopal episode.    Pt however does not agree she needs to be in the hospital. Repeatedly states she's a highly educated woman but she's being "treated like I'm stupid, and I'm not". States she is "outraged" at having her belongings and clothing taken away from her, and at the ED staff "talking about me like I'm a baby... not here". States she did not ask to come to this hospital. rec zyprexa prn, enhanced care     9/8/24 Called to see pt after she had received Zyprexa 2.5mg IM for trying to run out of unit, and then was fighting, kicking staff who attempted to bring her back to her room.  Pt feels she is being imprisoned and upset that she is being "drugged."  She is unable to discuss any medical issues at this time and appears slightly paranoid regarding staff.  Discussed adding standing Seroquel with medical team since pt has not yet responded to the one dose of Zyprexa 2.5mg IM.   Recommendations(discussed)  Begin Seroquel 25mg po BID (hold for excess sedation and for prolonged QTC greater than 500ms)   Continue Zyprexa 2.5mg IM q8hrs prn severe agitation  Seroquel 25g po q8hrs prn agitation (in addition to standing dose of 25mg BID if needed)   
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 Psychiatry consulted for agitation (pt reportedly wandering around the ER, going to the nurses' station and banging on the counter, yelling.    Pt evaluated at bedside, found sitting calmly and eating lunch. Per chart and pt, she was walking normally at the NH when she felt like her legs were going to suddenly give out, so she made her way to a nearby couch and sat down. Staff at the NH felt pt should be evaluated so she was sent to the ED. This weakness has since resolved and pt has been ambulating around the ED without incident (though notably she's desatting to the 80s). Pt now being given abx for acute colitis and being worked up for AHRF and ?syncopal episode.    Pt however does not agree she needs to be in the hospital. Repeatedly states she's a highly educated woman but she's being "treated like I'm stupid, and I'm not". States she is "outraged" at having her belongings and clothing taken away from her, and at the ED staff "talking about me like I'm a baby... not here". States she did not ask to come to this hospital. rec zyprexa prn, enhanced care     9/8/24 Called to see pt after she had received Zyprexa 2.5mg IM for trying to run out of unit, and then was fighting, kicking staff who attempted to bring her back to her room.  Pt feels she is being imprisoned and upset that she is being "drugged."  She is unable to discuss any medical issues at this time and appears slightly paranoid regarding staff.  Discussed adding standing Seroquel with medical team since pt has not yet responded to the one dose of Zyprexa 2.5mg IM.   Recommendations(discussed)  Begin Seroquel 25mg po BID (hold for excess sedation and for prolonged QTC greater than 500ms)   Continue Zyprexa 2.5mg IM q8hrs prn severe agitation  Seroquel 25g po q8hrs prn agitation (in addition to standing dose of 25mg BID if needed)   
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 Psychiatry consulted for agitation (pt reportedly wandering around the ER, going to the nurses' station and banging on the counter, yelling.    Pt evaluated at bedside, found sitting calmly and eating lunch. Per chart and pt, she was walking normally at the NH when she felt like her legs were going to suddenly give out, so she made her way to a nearby couch and sat down. Staff at the NH felt pt should be evaluated so she was sent to the ED. This weakness has since resolved and pt has been ambulating around the ED without incident (though notably she's desatting to the 80s). Pt now being given abx for acute colitis and being worked up for AHRF and ?syncopal episode.    Pt however does not agree she needs to be in the hospital. Repeatedly states she's a highly educated woman but she's being "treated like I'm stupid, and I'm not". States she is "outraged" at having her belongings and clothing taken away from her, and at the ED staff "talking about me like I'm a baby... not here". States she did not ask to come to this hospital. rec zyprexa prn, enhanced care     9/8/24 Called to see pt after she had received Zyprexa 2.5mg IM for trying to run out of unit, and then was fighting, kicking staff who attempted to bring her back to her room.  Pt feels she is being imprisoned and upset that she is being "drugged."  She is unable to discuss any medical issues at this time and appears slightly paranoid regarding staff.  Discussed adding standing Seroquel with medical team since pt has not yet responded to the one dose of Zyprexa 2.5mg IM.   Recommendations(discussed)  Begin Seroquel 25mg po BID (hold for excess sedation and for prolonged QTC greater than 500ms)   Continue Zyprexa 2.5mg IM q8hrs prn severe agitation  Seroquel 25g po q8hrs prn agitation (in addition to standing dose of 25mg BID if needed)

## 2024-09-11 NOTE — ED PROVIDER NOTE - ATTENDING CONTRIBUTION TO CARE
89 yo female presents after fall.  was discharged from this hospital ~3 hours prior to being found down at her assisted living apartment by staff.  EMS @ bedside for collateral.    patient without complaint, denies injury.  given unwitnessed fall and age, will CT head, otherwise likely requires readmission as unsafe to send back to her assisted living.  I (DALIA) personally discussed with medicine attending (NEO).

## 2024-09-11 NOTE — BH CONSULTATION LIAISON PROGRESS NOTE - NSBHCHARTREVIEWLAB_PSY_A_CORE FT
11.2   8.94  )-----------( 156      ( 08 Sep 2024 07:31 )             34.7     09-08    141  |  110<H>  |  24<H>  ----------------------------<  90  4.2   |  21<L>  |  1.06    Ca    11.3<H>      08 Sep 2024 07:31                             12.0   10.12 )-----------( 170      ( 10 Sep 2024 06:59 )             37.2     09-10    140  |  107  |  23  ----------------------------<  107<H>  4.0   |  21<L>  |  1.14    Ca    11.3<H>      10 Sep 2024 06:59  Phos  2.2     09-10  Mg     2.2     09-10

## 2024-09-11 NOTE — CONSULT NOTE ADULT - SUBJECTIVE AND OBJECTIVE BOX
Chief Complaint:  Patient is a 89y old Female who presents with a chief complaint of weakness (11 Sep 2024 09:18)      HPI: 89F PMHx Depression, Dementia (Ox1 @Baseline, Ox2 here on Donepezil and Namenda), HLD, GERD, Constipation, Diverticulitis/IBS, carotid stenosis, not on daily antiplatelets presenting from assisted living w/ gait difficulty. Gi consulted for nausea, vomiting, and constipation. patient stated that she had an enema yesterday. Patient had a bowel movement today. Denies nausea, vomiting, coughing up blood. Denies chest pain, SOB.        Allergies:  iodine (Unknown)  Keflex (Other)  CT scan dye (Rhinitis)      Home Medications:    Hospital Medications:  acetaminophen     Tablet .. 650 milliGRAM(s) Oral every 6 hours PRN  aluminum hydroxide/magnesium hydroxide/simethicone Suspension 30 milliLiter(s) Oral every 4 hours PRN  amLODIPine   Tablet 5 milliGRAM(s) Oral daily  atorvastatin 10 milliGRAM(s) Oral at bedtime  ciprofloxacin     Tablet 500 milliGRAM(s) Oral every 12 hours  heparin   Injectable 5000 Unit(s) SubCutaneous every 8 hours  latanoprost 0.005% Ophthalmic Solution 1 Drop(s) Both EYES at bedtime  melatonin 5 milliGRAM(s) Oral at bedtime  memantine 10 milliGRAM(s) Oral two times a day  metroNIDAZOLE    Tablet 500 milliGRAM(s) Oral every 8 hours  mirtazapine Soltab 45 milliGRAM(s) Oral at bedtime  OLANZapine Injectable 2.5 milliGRAM(s) IntraMuscular every 8 hours PRN  ondansetron   Disintegrating Tablet 4 milliGRAM(s) Oral every 8 hours PRN  pantoprazole    Tablet 40 milliGRAM(s) Oral before breakfast  polyethylene glycol 3350 17 Gram(s) Oral daily  QUEtiapine 25 milliGRAM(s) Oral two times a day  QUEtiapine 25 milliGRAM(s) Oral once  senna 2 Tablet(s) Oral at bedtime  sertraline 50 milliGRAM(s) Oral daily  sodium chloride 0.9%. 1000 milliLiter(s) IV Continuous <Continuous>      PMHX/PSHX:  Depression    GERD (gastroesophageal reflux disease)    Hyperlipemia    Melanoma    Basal cell cancer    History of cholecystectomy    Hx of bilateral breast reduction surgery    Traumatic amputation of finger    S/P cataract surgery        Family history:  FH: breast cancer (Mother)        ROS:     General:  No wt loss, fevers, chills, night sweats, fatigue  Eyes:  Good vision, no reported pain  ENT:  No sore throat, pain, runny nose, dysphagia  CV:  No pain, palpitations, hypo/hypertension  Pulm:  No dyspnea, cough, tachypnea, wheezing  GI:  see HPI  :  No pain, bleeding, incontinence, nocturia  Muscle:  No pain, weakness  Neuro:  No weakness, tingling, memory problems  Psych:  No fatigue, insomnia, mood problems, depression  Endocrine:  No polyuria, polydipsia, cold/heat intolerance  Heme:  No petechiae, ecchymosis, easy bruisability  Skin:  No rash, tattoos, scars, edema    PHYSICAL EXAM:     GENERAL:  No acute distress  HEENT:  NCAT, no scleral icterus   CHEST:  no respiratory distress  HEART:  Regular rate and rhythm  ABDOMEN:  Soft, non-tender, non-distended, normoactive bowel sounds,  no masses, no hepato-splenomegaly, no signs of chronic liver disease  EXTREMITIES: No edema  SKIN:  No rash/erythema/ecchymoses/petechiae/wounds/abscess/warm/dry  NEURO:  Alert and oriented x 3, no asterixis    Vital Signs:  Vital Signs Last 24 Hrs  T(C): 36.3 (11 Sep 2024 04:56), Max: 37.1 (10 Sep 2024 19:55)  T(F): 97.4 (11 Sep 2024 04:56), Max: 98.7 (10 Sep 2024 19:55)  HR: 96 (11 Sep 2024 04:56) (77 - 96)  BP: 142/62 (11 Sep 2024 04:56) (130/69 - 143/65)  BP(mean): --  RR: 18 (11 Sep 2024 04:56) (18 - 20)  SpO2: 94% (11 Sep 2024 04:56) (92% - 94%)    Parameters below as of 11 Sep 2024 04:56  Patient On (Oxygen Delivery Method): room air      Daily     Daily     LABS:                        12.0   10.12 )-----------( 170      ( 10 Sep 2024 06:59 )             37.2       09-10    140  |  107  |  23  ----------------------------<  107<H>  4.0   |  21<L>  |  1.14    Ca    11.3<H>      10 Sep 2024 06:59  Phos  2.2     09-10  Mg     2.2     09-10          Urinalysis Basic - ( 10 Sep 2024 06:59 )    Color: x / Appearance: x / SG: x / pH: x  Gluc: 107 mg/dL / Ketone: x  / Bili: x / Urobili: x   Blood: x / Protein: x / Nitrite: x   Leuk Esterase: x / RBC: x / WBC x   Sq Epi: x / Non Sq Epi: x / Bacteria: x                              12.0   10.12 )-----------( 170      ( 10 Sep 2024 06:59 )             37.2       Imaging:           Chief Complaint:  Patient is a 89y old Female who presents with a chief complaint of weakness (11 Sep 2024 09:18)      HPI: 89F PMHx Depression, Dementia, HLD, GERD, Constipation, Diverticulitis/IBS, carotid stenosis, not on daily antiplatelets presenting from assisted living w/ gait difficulty. GI consulted for nausea, vomiting, and constipation. Patient stated that she had an enema yesterday. Patient had a bowel movement today. Denies nausea, vomiting, coughing up blood. Denies chest pain, SOB.        Allergies:  iodine (Unknown)  Keflex (Other)  CT scan dye (Rhinitis)      Home Medications:    Hospital Medications:  acetaminophen     Tablet .. 650 milliGRAM(s) Oral every 6 hours PRN  aluminum hydroxide/magnesium hydroxide/simethicone Suspension 30 milliLiter(s) Oral every 4 hours PRN  amLODIPine   Tablet 5 milliGRAM(s) Oral daily  atorvastatin 10 milliGRAM(s) Oral at bedtime  ciprofloxacin     Tablet 500 milliGRAM(s) Oral every 12 hours  heparin   Injectable 5000 Unit(s) SubCutaneous every 8 hours  latanoprost 0.005% Ophthalmic Solution 1 Drop(s) Both EYES at bedtime  melatonin 5 milliGRAM(s) Oral at bedtime  memantine 10 milliGRAM(s) Oral two times a day  metroNIDAZOLE    Tablet 500 milliGRAM(s) Oral every 8 hours  mirtazapine Soltab 45 milliGRAM(s) Oral at bedtime  OLANZapine Injectable 2.5 milliGRAM(s) IntraMuscular every 8 hours PRN  ondansetron   Disintegrating Tablet 4 milliGRAM(s) Oral every 8 hours PRN  pantoprazole    Tablet 40 milliGRAM(s) Oral before breakfast  polyethylene glycol 3350 17 Gram(s) Oral daily  QUEtiapine 25 milliGRAM(s) Oral two times a day  QUEtiapine 25 milliGRAM(s) Oral once  senna 2 Tablet(s) Oral at bedtime  sertraline 50 milliGRAM(s) Oral daily  sodium chloride 0.9%. 1000 milliLiter(s) IV Continuous <Continuous>      PMHX/PSHX:  Depression    GERD (gastroesophageal reflux disease)    Hyperlipemia    Melanoma    Basal cell cancer    History of cholecystectomy    Hx of bilateral breast reduction surgery    Traumatic amputation of finger    S/P cataract surgery        Family history:  FH: breast cancer (Mother)        ROS:     General:  No fevers  CV:  No pain  Pulm:  No dyspnea  GI:  see HPI      PHYSICAL EXAM:     GENERAL:  No acute distress  CHEST:  no respiratory distress  ABDOMEN: +pain with palpation of right lower quadrant; soft, non-distended  EXTREMITIES: No edema      Vital Signs:  Vital Signs Last 24 Hrs  T(C): 36.3 (11 Sep 2024 04:56), Max: 37.1 (10 Sep 2024 19:55)  T(F): 97.4 (11 Sep 2024 04:56), Max: 98.7 (10 Sep 2024 19:55)  HR: 96 (11 Sep 2024 04:56) (77 - 96)  BP: 142/62 (11 Sep 2024 04:56) (130/69 - 143/65)  BP(mean): --  RR: 18 (11 Sep 2024 04:56) (18 - 20)  SpO2: 94% (11 Sep 2024 04:56) (92% - 94%)    Parameters below as of 11 Sep 2024 04:56  Patient On (Oxygen Delivery Method): room air      Daily     Daily     LABS:                        12.0   10.12 )-----------( 170      ( 10 Sep 2024 06:59 )             37.2       09-10    140  |  107  |  23  ----------------------------<  107<H>  4.0   |  21<L>  |  1.14    Ca    11.3<H>      10 Sep 2024 06:59  Phos  2.2     09-10  Mg     2.2     09-10          Urinalysis Basic - ( 10 Sep 2024 06:59 )    Color: x / Appearance: x / SG: x / pH: x  Gluc: 107 mg/dL / Ketone: x  / Bili: x / Urobili: x   Blood: x / Protein: x / Nitrite: x   Leuk Esterase: x / RBC: x / WBC x   Sq Epi: x / Non Sq Epi: x / Bacteria: x                              12.0   10.12 )-----------( 170      ( 10 Sep 2024 06:59 )             37.2       Imaging:  - X-ray Abdomen (9/11) IMPRESSION: Nonobstructive bowel gas pattern. Moderate stool burden.

## 2024-09-11 NOTE — PROGRESS NOTE ADULT - SUBJECTIVE AND OBJECTIVE BOX
Date of Service: 09-11-24 @ 08:33           CARDIOLOGY     PROGRESS  NOTE   ________________________________________________    CHIEF COMPLAINT:Patient is a 89y old  Female who presents with a chief complaint of weakness (10 Sep 2024 20:40)  no complain  	  REVIEW OF SYSTEMS:  CONSTITUTIONAL: No fever, weight loss, or fatigue  EYES: No eye pain, visual disturbances, or discharge  ENT:  No difficulty hearing, tinnitus, vertigo; No sinus or throat pain  NECK: No pain or stiffness  RESPIRATORY: No cough, wheezing, chills or hemoptysis; No Shortness of Breath  CARDIOVASCULAR: No chest pain, palpitations, passing out, dizziness, or leg swelling  GASTROINTESTINAL: No abdominal or epigastric pain. No nausea, vomiting, or hematemesis; No diarrhea or constipation. No melena or hematochezia.  GENITOURINARY: No dysuria, frequency, hematuria, or incontinence  NEUROLOGICAL: No headaches, memory loss, loss of strength, numbness, or tremors  SKIN: No itching, burning, rashes, or lesions   LYMPH Nodes: No enlarged glands  ENDOCRINE: No heat or cold intolerance; No hair loss  MUSCULOSKELETAL: No joint pain or swelling; No muscle, back, or extremity pain  PSYCHIATRIC: No depression, anxiety, mood swings, or difficulty sleeping  HEME/LYMPH: No easy bruising, or bleeding gums  ALLERGY AND IMMUNOLOGIC: No hives or eczema	    [x ] All others negative	  [ ] Unable to obtain    PHYSICAL EXAM:  T(C): 36.3 (09-11-24 @ 04:56), Max: 37.1 (09-10-24 @ 19:55)  HR: 96 (09-11-24 @ 04:56) (77 - 96)  BP: 142/62 (09-11-24 @ 04:56) (130/69 - 143/65)  RR: 18 (09-11-24 @ 04:56) (18 - 20)  SpO2: 94% (09-11-24 @ 04:56) (92% - 94%)  Wt(kg): --  I&O's Summary      Appearance: Normal	  HEENT:   Normal oral mucosa, PERRL, EOMI	  Lymphatic: No lymphadenopathy  Cardiovascular: Normal S1 S2, No JVD, + murmurs, No edema  Respiratory: rhonchi  Psychiatry: dementia  Gastrointestinal:  Soft, Non-tender, + BS	  Skin: No rashes, No ecchymoses, No cyanosis	  Extremities: Normal range of motion, No clubbing, cyanosis or edema  Vascular: Peripheral pulses palpable 2+ bilaterally    MEDICATIONS  (STANDING):  amLODIPine   Tablet 5 milliGRAM(s) Oral daily  atorvastatin 10 milliGRAM(s) Oral at bedtime  ciprofloxacin     Tablet 500 milliGRAM(s) Oral every 12 hours  heparin   Injectable 5000 Unit(s) SubCutaneous every 8 hours  latanoprost 0.005% Ophthalmic Solution 1 Drop(s) Both EYES at bedtime  melatonin 5 milliGRAM(s) Oral at bedtime  memantine 10 milliGRAM(s) Oral two times a day  metroNIDAZOLE    Tablet 500 milliGRAM(s) Oral every 8 hours  mirtazapine Soltab 45 milliGRAM(s) Oral at bedtime  pantoprazole    Tablet 40 milliGRAM(s) Oral before breakfast  polyethylene glycol 3350 17 Gram(s) Oral daily  QUEtiapine 25 milliGRAM(s) Oral once  QUEtiapine 25 milliGRAM(s) Oral two times a day  senna 2 Tablet(s) Oral at bedtime  sertraline 50 milliGRAM(s) Oral daily  sodium chloride 0.9%. 1000 milliLiter(s) (60 mL/Hr) IV Continuous <Continuous>      TELEMETRY: 	    ECG:  	  RADIOLOGY:  OTHER: 	  	  LABS:	 	    CARDIAC MARKERS:                                12.0   10.12 )-----------( 170      ( 10 Sep 2024 06:59 )             37.2     09-10    140  |  107  |  23  ----------------------------<  107<H>  4.0   |  21<L>  |  1.14    Ca    11.3<H>      10 Sep 2024 06:59  Phos  2.2     09-10  Mg     2.2     09-10      proBNP:   Lipid Profile:   HgA1c:   TSH:     Basic Metabolic Panel in AM (09.10.24 @ 06:59)    Calcium: 11.3 mg/dL        Assessment and plan  ---------------------------  89 yo F with hx dementia GERD, HLD, carotid stenosis, depression p/w weakness and difficulty ambulating. Patient states she was walking when she felt like her legs were going to give out. She felt weak at the time and felt like she may fall so she sat down on a nearby couch. One of the people at the facility told her she should get checked out and called for her to come here. She states she just felt weak and denies chest pain, sob, lightheadness, dizziness when this occurred. It has never happened before. She felt better after she sat down on the couch. Currently no complaints and wants to go back home. does not know why she is here. no fevers, chills, palpitations, dysuria, diarrhea, constipation. some abdominal discomfort in lower area.    pt with ?carotid stenosis with ?near syncope  observe HR, bp elevated ?agitation  check orthostatic  asa 81 mg daily  physical therapy  GOC  dvt prophylaxis  check ua and cultures. noted  will order cipro, will consider GI eval  repeat calcium level is improving  chest x ray noted pneumoniae ?aspiration, check speech and swallow  ecg to check qtc interval  will adjust bp meds  continue ivf for hypercalcemia, needs to be followed  +mild orthostatathic/ htn sonia add bp meds, on norvasc 5 mg daily, bp is better controlled  dvt prophylaxis

## 2024-09-11 NOTE — BH CONSULTATION LIAISON PROGRESS NOTE - NSICDXBHPRIMARYDX_PSY_ALL_CORE
Delirium due to other cause   F05  

## 2024-09-11 NOTE — ED PROVIDER NOTE - PHYSICAL EXAMINATION
GENERAL: NAD, lying in bed comfortably  HEAD:  Atraumatic, normocephalic  EYES: EOMI, PERRLA, conjunctiva and sclera clear  NECK: Supple, trachea midline, no JVD  HEART: Regular rate and rhythm, no murmurs, rubs, or gallops  LUNGS: Unlabored respirations.  Clear to auscultation bilaterally, no crackles, wheezing, or rhonchi  ABDOMEN: Soft, nontender, nondistended, +BS  MSK: no mildine thoracic tenderness or extremity tenderness  EXTREMITIES: 2+ peripheral pulses bilaterally. No clubbing, cyanosis, or edema, tenderness over right hip  NERVOUS SYSTEM:  A&Ox3, moving all extremities, no focal deficits   SKIN: No rashes or lesions

## 2024-09-11 NOTE — DISCHARGE NOTE PROVIDER - NSDCCPCAREPLAN_GEN_ALL_CORE_FT
PRINCIPAL DISCHARGE DIAGNOSIS  Diagnosis: Toxic metabolic encephalopathy  Assessment and Plan of Treatment: you were admitted in the setting of metabolic encephalopathy in the setting of colitis /aura  symtomas improved with treatment      SECONDARY DISCHARGE DIAGNOSES  Diagnosis: Acute colitis  Assessment and Plan of Treatment: Received course of cipro/flagyl  continue regular diet    Diagnosis: AURA (acute kidney injury)  Assessment and Plan of Treatment: Resolved with IV fluids    Diagnosis: Unsteady gait  Assessment and Plan of Treatment: Improved    Diagnosis: Dementia  Assessment and Plan of Treatment: you were treated for delerium on demnetia  follow up with psychiatry     PRINCIPAL DISCHARGE DIAGNOSIS  Diagnosis: Toxic metabolic encephalopathy  Assessment and Plan of Treatment: Patient was admitted with  metabolic encephalopathy in the setting of colitis /aura  symtoms improved with treatment      SECONDARY DISCHARGE DIAGNOSES  Diagnosis: Acute colitis  Assessment and Plan of Treatment: Received course of IV Cipro and Flagyl.  Continue oral antibiotics until all are complete.   Continue regular diet.    Diagnosis: Unsteady gait  Assessment and Plan of Treatment: Improved.  Fall and Safety Precautions.   Frequent reorirntation.    Diagnosis: Dementia  Assessment and Plan of Treatment: Pt was treated for delerium on dementia.  Continue medications as prescribed.   Follow up with PCP and Psychiatry.    Diagnosis: AURA (acute kidney injury)  Assessment and Plan of Treatment: Resolved with IV fluids.  Follow up with PCP for monitoring and management of kidney function.    Diagnosis: Major depression  Assessment and Plan of Treatment: Take your medication as prescribed.   Follow up with your medical doctor for routine blood  work monitoring, and to establish long term treatment goals.

## 2024-09-11 NOTE — DISCHARGE NOTE NURSING/CASE MANAGEMENT/SOCIAL WORK - NSDCFUADDAPPT_GEN_ALL_CORE_FT
APPTS ARE READY TO BE MADE: [x] YES    Best Family or Patient Contact (if needed): Pt's daughter.     Additional Information about above appointments (if needed):    1:   2:   3:     Other comments or requests:

## 2024-09-11 NOTE — DISCHARGE NOTE NURSING/CASE MANAGEMENT/SOCIAL WORK - NSDCVIVACCINE_GEN_ALL_CORE_FT
Tdap; 11-Jan-2022 19:55; Evelia Oreilly (RN); [employer]; [id number] (Exp. Date: 09-Sep-2023); IntraMuscular; Deltoid Right.; 0.5 milliLiter(s); VIS (VIS Published: 09-May-2013, VIS Presented: 11-Jan-2022);

## 2024-09-11 NOTE — PROVIDER CONTACT NOTE (OTHER) - SITUATION
Patient is desating at 81% during routine vital signs check.
Patient's BP is 176/78 with HR 87 this morning. Patient is agitated. am medications administered after a lot of encouragement.
pt refusing all meds, vitals and tele
pt ripped out IV
Pt refusing telemetry, medication and blood work. Several attempts were made to reassure individual and educate on the risk of not adhering to medication. Provider is aware of patient refusal.

## 2024-09-11 NOTE — PROVIDER CONTACT NOTE (OTHER) - ASSESSMENT
Pt A&O*1. pleasant but removed IV because she "doesn't want or need this." Pt has removed IVs on this admission already. Pt ordered for IV antibiotics and fluids that cannot be administered as ordered at this time. Pt refusing all meds, IV and tele at this time
Patient A&Ox2. VS as documented. Patient O2 saturation is 81%. Patient denies sob, headache, dizziness, other pain and discomfort at this time.
pt A&O*1 but pleasant. Pt states that they do not want any medications or heart monitor or any care at this time. Pt stated "I feel better right now and I do not want anything". Pt ate breakfast and was able to be assisted with am care but still refusing morning meds including IV antibiotics. Pt has removed multiple IVs on this admission as reported by previous RN. Pt currently has wrapped IV access but will not let RN assess or utilize. Pt UTI positive. Pt was on [facility] prior and since pt is non compliant to tele, RN recommended pt to return to [facility] if availability because pt accepted meds on that unit.
Patient A&Ox2. VS as documented. Patient agitated and refusing labs this morning.
Pt is axo1, agitated and restless. Pt is on chair, able to ambulate, no unsteady gait noted.

## 2024-09-11 NOTE — BH CONSULTATION LIAISON PROGRESS NOTE - NSBHCONSFOLLOWNEEDS_PSY_ALL_CORE
Immunizations     Name Date Dose Route    Influenza, Verito Litten, 3 yrs and older, IM, PF (Fluzone 3 yrs and older or Afluria 5 yrs and older) 11/8/2018 0.5 mL Intramuscular    Site: Deltoid- Right    Lot: [id number]    NDC: 41688-259-84          VIS GIVEN  PATIENT TOLERATED WELL
No psychiatric contraindications to discharge

## 2024-09-12 RX ORDER — LATANOPROST 50 UG/ML
1 SOLUTION OPHTHALMIC AT BEDTIME
Refills: 0 | Status: DISCONTINUED | OUTPATIENT
Start: 2024-09-12 | End: 2024-09-16

## 2024-09-12 RX ORDER — MEMANTINE 7 MG/1
10 CAPSULE, EXTENDED RELEASE ORAL EVERY 12 HOURS
Refills: 0 | Status: DISCONTINUED | OUTPATIENT
Start: 2024-09-12 | End: 2024-09-16

## 2024-09-12 RX ORDER — AMLODIPINE BESYLATE 10 MG/1
5 TABLET ORAL DAILY
Refills: 0 | Status: DISCONTINUED | OUTPATIENT
Start: 2024-09-12 | End: 2024-09-16

## 2024-09-12 RX ORDER — HEPARIN SODIUM,BOVINE 1000/ML
5000 VIAL (ML) INJECTION EVERY 12 HOURS
Refills: 0 | Status: DISCONTINUED | OUTPATIENT
Start: 2024-09-12 | End: 2024-09-16

## 2024-09-12 RX ORDER — MIRTAZAPINE 30 MG
15 TABLET ORAL AT BEDTIME
Refills: 0 | Status: DISCONTINUED | OUTPATIENT
Start: 2024-09-12 | End: 2024-09-16

## 2024-09-12 RX ORDER — FLU VACCINE TS 2012-2013(5YR+) 45MCG/.5ML
0.5 VIAL (ML) INTRAMUSCULAR ONCE
Refills: 0 | Status: COMPLETED | OUTPATIENT
Start: 2024-09-12 | End: 2024-09-14

## 2024-09-12 RX ORDER — ACETAMINOPHEN 325 MG/1
1 TABLET ORAL
Refills: 0 | DISCHARGE

## 2024-09-12 RX ORDER — SERTRALINE HYDROCHLORIDE 50 MG/1
50 TABLET, FILM COATED ORAL DAILY
Refills: 0 | Status: DISCONTINUED | OUTPATIENT
Start: 2024-09-12 | End: 2024-09-16

## 2024-09-12 RX ORDER — SENNA 187 MG
2 TABLET ORAL AT BEDTIME
Refills: 0 | Status: DISCONTINUED | OUTPATIENT
Start: 2024-09-12 | End: 2024-09-16

## 2024-09-12 RX ADMIN — LATANOPROST 1 DROP(S): 50 SOLUTION OPHTHALMIC at 21:30

## 2024-09-12 RX ADMIN — SERTRALINE HYDROCHLORIDE 50 MILLIGRAM(S): 50 TABLET, FILM COATED ORAL at 13:22

## 2024-09-12 RX ADMIN — Medication 25 MILLIGRAM(S): at 18:43

## 2024-09-12 RX ADMIN — MEMANTINE 10 MILLIGRAM(S): 7 CAPSULE, EXTENDED RELEASE ORAL at 18:43

## 2024-09-12 RX ADMIN — Medication 5000 UNIT(S): at 18:43

## 2024-09-12 RX ADMIN — AMLODIPINE BESYLATE 5 MILLIGRAM(S): 10 TABLET ORAL at 13:26

## 2024-09-12 RX ADMIN — Medication 15 MILLIGRAM(S): at 22:29

## 2024-09-12 NOTE — PHARMACOTHERAPY INTERVENTION NOTE - COMMENTS
Medication Reconciliation completed for patient.  These were confirmed with patient's facility, [facility].  Updated medications are reflected in Outpatient Medication Review.     Home Medication:  acetaminophen 500 mg oral tablet: 1 tab(s) orally 3 times a day  amLODIPine 5 mg oral tablet: 1 tab(s) orally once a day  atorvastatin 10 mg oral tablet: 1 tab(s) orally once a day (at bedtime)  docusate sodium 100 mg oral capsule: 1 cap(s) orally once a day  Hydrocortisone acetate 25mg suppository rectal: 1 suppository rectally 2 times a day as needed for hemorrhoids  latanoprost 0.005% ophthalmic solution: 1 drop(s) to each affected eye once a day (in the evening)  melatonin 5 mg oral tablet: 1 tab(s) orally once a day (at bedtime)  memantine 10 mg oral tablet: 1 tab(s) orally every 12 hours  memantine 28 mg oral capsule, extended release: 1 cap(s) orally once a day at 12pm  mirtazapine 45 mg oral tablet, disintegratin tab(s) orally once a day (at bedtime)  omeprazole 20 mg oral delayed release tablet: 1 tab(s) orally once a day before breakfast  PreserVision AREDS 2 oral capsule: 1 cap(s) orally once a day  QUEtiapine 25 mg oral tablet: 1 tab(s) orally 2 times a day  senna leaf extract oral tablet: 2 tab(s) orally once a day (at bedtime) HOLD FOR LOOSE STOOLS  sertraline 50 mg oral tablet: 1 tab(s) orally once a day  Vitamin D3 50 mcg (2000 intl units) oral tablet: 1 tab(s) orally once a day      Charla Cheng, PharmD, BCPS  Clinical Pharmacy Specialist  Available on Teams

## 2024-09-12 NOTE — H&P ADULT - NSHPPHYSICALEXAM_GEN_ALL_CORE
T(C): 36.9 (09-12-24 @ 04:20), Max: 37.4 (09-12-24 @ 02:15)  HR: 85 (09-12-24 @ 04:20) (71 - 92)  BP: 117/67 (09-12-24 @ 04:20) (102/57 - 157/71)  RR: 18 (09-12-24 @ 04:20) (15 - 18)  SpO2: 93% (09-12-24 @ 04:20) (92% - 98%)  GENERAL: NAD, lying in bed comfortably  HEAD:  Atraumatic, normocephalic  EYES: EOMI, PERRLA, conjunctiva and sclera clear  NECK: Supple, trachea midline, no JVD  HEART: Regular rate and rhythm, no murmurs, rubs, or gallops  LUNGS: Unlabored respirations.  Clear to auscultation bilaterally, no crackles, wheezing, or rhonchi  ABDOMEN: Soft, nontender, nondistended, +BS  EXTREMITIES: 2+ peripheral pulses bilaterally. No clubbing, cyanosis, or edema  NERVOUS SYSTEM:    dementia  SKIN: No rashes or lesions

## 2024-09-12 NOTE — CONSULT NOTE ADULT - SUBJECTIVE AND OBJECTIVE BOX
Date of Service, 09-12-24 @ 19:43  CHIEF COMPLAINT:Patient is a 89y old  Female who presents with a chief complaint of fall (12 Sep 2024 09:24)      HPI:   89 yo F with hx dementia GERD, HLD, carotid stenosis, depression p/w weakness and difficulty ambulating. Patient patient presents for fall  Patient was found down at living facility [facility] after fall.  Patient does not recall fall patient endorsing pain at right hip patient denies headache neck pain back pain shoulder pain arm pain leg pain.  Patient denies fevers chills nausea vomiting shortness of breath dysuria.      PAST MEDICAL & SURGICAL HISTORY:  Depression  GERD (gastroesophageal reflux disease)  Hyperlipemia  Melanoma  Basal cell cancer  History of cholecystectomy  Hx of bilateral breast reduction surgery  Traumatic amputation of finger  S/P cataract surgery  left eye      MEDICATIONS  (STANDING):  amLODIPine   Tablet 5 milliGRAM(s) Oral daily  atorvastatin 10 milliGRAM(s) Oral at bedtime  heparin   Injectable 5000 Unit(s) SubCutaneous every 12 hours  influenza  Vaccine (HIGH DOSE) 0.5 milliLiter(s) IntraMuscular once  latanoprost 0.005% Ophthalmic Solution 1 Drop(s) Both EYES at bedtime  melatonin 3 milliGRAM(s) Oral at bedtime  memantine 10 milliGRAM(s) Oral every 12 hours  mirtazapine Soltab 15 milliGRAM(s) Oral at bedtime  QUEtiapine 25 milliGRAM(s) Oral two times a day  senna 2 Tablet(s) Oral at bedtime  sertraline 50 milliGRAM(s) Oral daily    MEDICATIONS  (PRN):      FAMILY HISTORY:  FH: breast cancer (Mother)        SOCIAL HISTORY:    [ ] Non-smoker  [ ] Smoker  [ ] Alcohol    Allergies    Keflex (Other)  CT scan dye (Rhinitis)  iodine (Unknown)    Intolerances    	    REVIEW OF SYSTEMS:  CONSTITUTIONAL: No fever, weight loss, or fatigue  EYES: No eye pain, visual disturbances, or discharge  ENT:  No difficulty hearing, tinnitus, vertigo; No sinus or throat pain  NECK: No pain or stiffness  RESPIRATORY: No cough, wheezing, chills or hemoptysis; No Shortness of Breath  CARDIOVASCULAR: No chest pain, palpitations, passing out, dizziness, or leg swelling  GASTROINTESTINAL: No abdominal or epigastric pain. No nausea, vomiting, or hematemesis; No diarrhea or constipation. No melena or hematochezia.  GENITOURINARY: No dysuria, frequency, hematuria, or incontinence  NEUROLOGICAL: No headaches, memory loss, loss of strength, numbness, or tremors  SKIN: No itching, burning, rashes, or lesions   LYMPH Nodes: No enlarged glands  ENDOCRINE: No heat or cold intolerance; No hair loss  MUSCULOSKELETAL: No joint pain or swelling; No muscle, back, or extremity pain  PSYCHIATRIC: No depression, anxiety, mood swings, or difficulty sleeping  HEME/LYMPH: No easy bruising, or bleeding gums  ALLERGY AND IMMUNOLOGIC: No hives or eczema	    [ ] All others negative	  [x ] Unable to obtain    PHYSICAL EXAM:  T(C): 36.9 (09-12-24 @ 12:56), Max: 37.4 (09-12-24 @ 02:15)  HR: 77 (09-12-24 @ 12:56) (77 - 92)  BP: 142/76 (09-12-24 @ 12:56) (105/73 - 157/71)  RR: 18 (09-12-24 @ 12:56) (15 - 18)  SpO2: 98% (09-12-24 @ 12:56) (92% - 98%)  Wt(kg): --  I&O's Summary    12 Sep 2024 07:01  -  12 Sep 2024 19:43  --------------------------------------------------------  IN: 240 mL / OUT: 0 mL / NET: 240 mL        Appearance: Normal	  HEENT:   Normal oral mucosa, PERRL, EOMI	  Lymphatic: No lymphadenopathy  Cardiovascular: Normal S1 S2, No JVD, + murmurs, No edema  Respiratory: rhonchi  Psychiatry:dementia  Gastrointestinal:  Soft, Non-tender, + BS	  Skin: No rashes, No ecchymoses, No cyanosis	  Extremities:  No clubbing, cyanosis or edema  Vascular: Peripheral pulses palpable 2+ bilaterally    TELEMETRY: 	    ECG:  	  RADIOLOGY:  OTHER: 	  	  LABS:	 	    CARDIAC MARKERS:                          11.7   11.59 )-----------( 186      ( 11 Sep 2024 22:11 )             37.5     09-11    139  |  104  |  27<H>  ----------------------------<  117<H>  4.4   |  23  |  1.15    Ca    11.3<H>      11 Sep 2024 22:11    TPro  7.1  /  Alb  3.8  /  TBili  0.3  /  DBili  x   /  AST  100<H>  /  ALT  78<H>  /  AlkPhos  119  09-11    proBNP:   Lipid Profile:   HgA1c:   TSH:       PREVIOUS DIAGNOSTIC TESTING:     < from: 12 Lead ECG (09.05.24 @ 21:03) >  Diagnosis Line NORMAL SINUS RHYTHM  LEFT VENTRICULAR HYPERTROPHY WITH REPOLARIZATION ABNORMALITY ( R in aVL , Kishore product )  ABNORMAL ECG  WHEN COMPARED WITH ECG OF 03-MAY-2024 20:00,  , NO INTERVAL CHANGE      < from: Xray Chest 1 View AP/PA (09.06.24 @ 09:29) >  Hazy opacities in the right upper lobe whichmay represent atelectasis   and/or infection.    < from: CT Head No Cont (09.11.24 @ 22:36) >  IMPRESSION:  No acute intracranial hemorrhage, displaced calvarial fracture, or mass   effect.

## 2024-09-12 NOTE — CONSULT NOTE ADULT - ASSESSMENT
89 yo F with hx dementia GERD, HLD, carotid stenosis, depression p/w weakness and difficulty ambulating. Patient patient presents for fall  Patient was found down at living facility [facility] after fall.  Patient does not recall fall patient endorsing pain at right hip patient denies headache neck pain back pain shoulder pain arm pain leg pain.  Patient denies fevers chills nausea vomiting shortness of breath dysuria.  pt is well known to me  syncope / fal ?sec to carotid stenosis/ orthostatic hypotension  pt is high risk candidate for fall  check calcium level  physical therapy  dvt prophylaxis  increase fluid intake, re check orthosttaic

## 2024-09-12 NOTE — H&P ADULT - HISTORY OF PRESENT ILLNESS
: 89 yo F        with hx dementia GERD, HLD,   , depression        p/w weakness and difficulty ambulating..  after fall  at facility      patient presents for fall    pt  found  on floor,   at living facility [facility] after fall    pt    does not recall fall patient endorsing pain at right hip      patient denies headache neck pain back pain shoulder pain arm pain leg pain.       denies fevers chills nausea vomiting shortness of breath dysuria.

## 2024-09-12 NOTE — PHYSICAL THERAPY INITIAL EVALUATION ADULT - GENERAL OBSERVATIONS, REHAB EVAL
Pt rec'd seated in chair, +chair alarm, +IVL, in NAD.  Pt cleared for PT session by Covering DUARTE Knowles.

## 2024-09-12 NOTE — PHYSICAL THERAPY INITIAL EVALUATION ADULT - PERTINENT HX OF CURRENT PROBLEM, REHAB EVAL
Pt is a 89 year old female with PMH significant for dementia GERD, HLD, depression.  Pt presents with weakness and difficulty ambulating s/p fall at facility.  Pt found on floor at [facility].  Pt endorsing pain at right hip.  Imaging negative for fractures  XR Abdomen(9/11): Nonobstructive bowel gas pattern. Moderate stool burden.  XR Pelvis(9/11): No hip fractures or dislocations. Intact pelvic and obturator rings and symmetrically aligned and spaced SI joints and pubic symphysis. Lower lumbar spine degenerative change. Mild right hip osteoarthritic change. Preserved left hip joint space. Generalized osteopenia otherwise no discrete lytic or blastic lesions.  CTH(9/11): No acute intracranial hemorrhage, displaced calvarial fracture, or mass effect.

## 2024-09-12 NOTE — PHYSICAL THERAPY INITIAL EVALUATION ADULT - ADDITIONAL COMMENTS
Pt lives at [facility], ambulates using rolling walker,  Per previous care coordination assessment, pt independent with most ADLs.

## 2024-09-12 NOTE — PATIENT PROFILE ADULT - FALL HARM RISK - HARM RISK INTERVENTIONS
Communicate Risk of Fall with Harm to all staff/Reinforce activity limits and safety measures with patient and family/Tailored Fall Risk Interventions/Use of alarms - bed, chair and/or voice tab/Visual Cue: Yellow wristband and red socks/Bed in lowest position, wheels locked, appropriate side rails in place/Call bell, personal items and telephone in reach/Instruct patient to call for assistance before getting out of bed or chair/Non-slip footwear when patient is out of bed/[place] to call system/Physically safe environment - no spills, clutter or unnecessary equipment/Purposeful Proactive Rounding/Room/bathroom lighting operational, light cord in reach

## 2024-09-12 NOTE — H&P ADULT - ASSESSMENT
89 yo F        hx  dementia  , HLD,   depression        p/w weakness and difficulty ambulating..  s/p  fall      patient presents for fall,  was  found  on floor,   at living facility [facility].    pt   does not recall fall patient endorsing pain at right hip    denies headache neck pain back pain shoulder pain arm pain leg pain./  fevers chills nausea vomiting shortness of breath dysuria.    fall.  unwitnessed.  ?  syncope         wbc noted,  is  afebrile          cxr, opacities,  prior  ct  9/624,  basilar  fibrotic  changes           check  orthostatics     ct   head, , no  bleed.  xray,  no  plevic  fx    HLD   Depression    dvt  ppx/  fall  risk   dr sloan  will assume  care       rad< from: Xray Chest 1 View AP/PA (09.06.24 @ 09:29) >  MPRESSION:  Hazy opacities in the right upper lobe whichmay represent atelectasis   and/or infection.     rad< from: CT Abdomen and Pelvis w/ IV Cont (09.06.24 @ 02:53) >  INDINGS:  LOWER CHEST: Revisualized bibasilar fibrotic changes. Coronary artery   calcifications. Small hiatal hernia.  LIVER: Within normal limits.  BILE DU  < end of copied text >      -

## 2024-09-12 NOTE — PHYSICAL THERAPY INITIAL EVALUATION ADULT - IMPAIRED TRANSFERS: SIT/STAND, REHAB EVAL
Pt with mild LOB upon standing, able to regain balance without assist/impaired balance/decreased strength

## 2024-09-12 NOTE — H&P ADULT - NSHPLABSRESULTS_GEN_ALL_CORE
LABS:                        11.7   11.59 )-----------( 186      ( 11 Sep 2024 22:11 )             37.5     09-11    139  |  104  |  27<H>  ----------------------------<  117<H>  4.4   |  23  |  1.15    Ca    11.3<H>      11 Sep 2024 22:11    TPro  7.1  /  Alb  3.8  /  TBili  0.3  /  DBili  x   /  AST  100<H>  /  ALT  78<H>  /  AlkPhos  119  09-11          Urinalysis Basic - ( 11 Sep 2024 22:11 )    Color: x / Appearance: x / SG: x / pH: x  Gluc: 117 mg/dL / Ketone: x  / Bili: x / Urobili: x   Blood: x / Protein: x / Nitrite: x   Leuk Esterase: x / RBC: x / WBC x   Sq Epi: x / Non Sq Epi: x / Bacteria: x

## 2024-09-13 LAB
FLUAV AG NPH QL: SIGNIFICANT CHANGE UP
FLUBV AG NPH QL: SIGNIFICANT CHANGE UP
RSV RNA NPH QL NAA+NON-PROBE: SIGNIFICANT CHANGE UP
SARS-COV-2 RNA SPEC QL NAA+PROBE: SIGNIFICANT CHANGE UP

## 2024-09-13 RX ORDER — GUAIFENESIN 100 MG/5ML
600 LIQUID ORAL EVERY 12 HOURS
Refills: 0 | Status: DISCONTINUED | OUTPATIENT
Start: 2024-09-13 | End: 2024-09-16

## 2024-09-13 RX ADMIN — Medication 25 MILLIGRAM(S): at 05:07

## 2024-09-13 RX ADMIN — SERTRALINE HYDROCHLORIDE 50 MILLIGRAM(S): 50 TABLET, FILM COATED ORAL at 12:08

## 2024-09-13 RX ADMIN — Medication 5000 UNIT(S): at 17:11

## 2024-09-13 RX ADMIN — Medication 25 MILLIGRAM(S): at 17:11

## 2024-09-13 RX ADMIN — LATANOPROST 1 DROP(S): 50 SOLUTION OPHTHALMIC at 21:24

## 2024-09-13 RX ADMIN — Medication 5000 UNIT(S): at 05:07

## 2024-09-13 RX ADMIN — Medication 10 MILLIGRAM(S): at 21:25

## 2024-09-13 RX ADMIN — AMLODIPINE BESYLATE 5 MILLIGRAM(S): 10 TABLET ORAL at 05:06

## 2024-09-13 RX ADMIN — Medication 15 MILLIGRAM(S): at 21:24

## 2024-09-13 RX ADMIN — MEMANTINE 10 MILLIGRAM(S): 7 CAPSULE, EXTENDED RELEASE ORAL at 17:10

## 2024-09-13 RX ADMIN — MEMANTINE 10 MILLIGRAM(S): 7 CAPSULE, EXTENDED RELEASE ORAL at 05:07

## 2024-09-13 RX ADMIN — Medication 2 TABLET(S): at 21:25

## 2024-09-13 RX ADMIN — Medication 3 MILLIGRAM(S): at 21:25

## 2024-09-13 RX ADMIN — GUAIFENESIN 600 MILLIGRAM(S): 100 LIQUID ORAL at 18:21

## 2024-09-13 NOTE — ED ADULT TRIAGE NOTE - NS ED NURSE BANDS TYPE
Pt arrives ambulatory accompanied by her son. States she is here for injection.   No new medications, pain, or new symptoms. VSS  Romiplostim injection given in right upper arm, pt tolerated well.  Dc'd to home, ambulatory, with her son.   
Name band;

## 2024-09-14 RX ORDER — SODIUM CHLORIDE 9 MG/ML
500 INJECTION INTRAMUSCULAR; INTRAVENOUS; SUBCUTANEOUS
Refills: 0 | Status: DISCONTINUED | OUTPATIENT
Start: 2024-09-14 | End: 2024-09-16

## 2024-09-14 RX ADMIN — Medication 5000 UNIT(S): at 06:30

## 2024-09-14 RX ADMIN — Medication 10 MILLIGRAM(S): at 21:47

## 2024-09-14 RX ADMIN — Medication 3 MILLIGRAM(S): at 21:46

## 2024-09-14 RX ADMIN — AMLODIPINE BESYLATE 5 MILLIGRAM(S): 10 TABLET ORAL at 06:30

## 2024-09-14 RX ADMIN — MEMANTINE 10 MILLIGRAM(S): 7 CAPSULE, EXTENDED RELEASE ORAL at 06:30

## 2024-09-14 RX ADMIN — MEMANTINE 10 MILLIGRAM(S): 7 CAPSULE, EXTENDED RELEASE ORAL at 17:13

## 2024-09-14 RX ADMIN — LATANOPROST 1 DROP(S): 50 SOLUTION OPHTHALMIC at 21:56

## 2024-09-14 RX ADMIN — SERTRALINE HYDROCHLORIDE 50 MILLIGRAM(S): 50 TABLET, FILM COATED ORAL at 11:10

## 2024-09-14 RX ADMIN — Medication 25 MILLIGRAM(S): at 17:13

## 2024-09-14 RX ADMIN — SODIUM CHLORIDE 50 MILLILITER(S): 9 INJECTION INTRAMUSCULAR; INTRAVENOUS; SUBCUTANEOUS at 10:47

## 2024-09-14 RX ADMIN — Medication 5000 UNIT(S): at 17:13

## 2024-09-14 RX ADMIN — Medication 25 MILLIGRAM(S): at 06:30

## 2024-09-14 RX ADMIN — Medication 2 TABLET(S): at 21:46

## 2024-09-14 RX ADMIN — Medication 15 MILLIGRAM(S): at 21:46

## 2024-09-14 RX ADMIN — Medication 0.5 MILLILITER(S): at 11:14

## 2024-09-15 LAB
ANION GAP SERPL CALC-SCNC: 9 MMOL/L — SIGNIFICANT CHANGE UP (ref 5–17)
BUN SERPL-MCNC: 15 MG/DL — SIGNIFICANT CHANGE UP (ref 7–23)
CALCIUM SERPL-MCNC: 11.3 MG/DL — HIGH (ref 8.4–10.5)
CHLORIDE SERPL-SCNC: 107 MMOL/L — SIGNIFICANT CHANGE UP (ref 96–108)
CO2 SERPL-SCNC: 25 MMOL/L — SIGNIFICANT CHANGE UP (ref 22–31)
CREAT SERPL-MCNC: 1 MG/DL — SIGNIFICANT CHANGE UP (ref 0.5–1.3)
EGFR: 52 ML/MIN/1.73M2 — LOW
GLUCOSE SERPL-MCNC: 84 MG/DL — SIGNIFICANT CHANGE UP (ref 70–99)
HCT VFR BLD CALC: 34.9 % — SIGNIFICANT CHANGE UP (ref 34.5–45)
HGB BLD-MCNC: 11.3 G/DL — LOW (ref 11.5–15.5)
MCHC RBC-ENTMCNC: 30.5 PG — SIGNIFICANT CHANGE UP (ref 27–34)
MCHC RBC-ENTMCNC: 32.4 GM/DL — SIGNIFICANT CHANGE UP (ref 32–36)
MCV RBC AUTO: 94.3 FL — SIGNIFICANT CHANGE UP (ref 80–100)
NRBC # BLD: 0 /100 WBCS — SIGNIFICANT CHANGE UP (ref 0–0)
PLATELET # BLD AUTO: 174 K/UL — SIGNIFICANT CHANGE UP (ref 150–400)
POTASSIUM SERPL-MCNC: 4.2 MMOL/L — SIGNIFICANT CHANGE UP (ref 3.5–5.3)
POTASSIUM SERPL-SCNC: 4.2 MMOL/L — SIGNIFICANT CHANGE UP (ref 3.5–5.3)
RBC # BLD: 3.7 M/UL — LOW (ref 3.8–5.2)
RBC # FLD: 14.1 % — SIGNIFICANT CHANGE UP (ref 10.3–14.5)
SODIUM SERPL-SCNC: 141 MMOL/L — SIGNIFICANT CHANGE UP (ref 135–145)
WBC # BLD: 7.94 K/UL — SIGNIFICANT CHANGE UP (ref 3.8–10.5)
WBC # FLD AUTO: 7.94 K/UL — SIGNIFICANT CHANGE UP (ref 3.8–10.5)

## 2024-09-15 RX ORDER — ACETAMINOPHEN 325 MG/1
650 TABLET ORAL EVERY 6 HOURS
Refills: 0 | Status: DISCONTINUED | OUTPATIENT
Start: 2024-09-15 | End: 2024-09-16

## 2024-09-15 RX ADMIN — SERTRALINE HYDROCHLORIDE 50 MILLIGRAM(S): 50 TABLET, FILM COATED ORAL at 11:55

## 2024-09-15 RX ADMIN — Medication 5000 UNIT(S): at 06:03

## 2024-09-15 RX ADMIN — Medication 25 MILLIGRAM(S): at 17:31

## 2024-09-15 RX ADMIN — ACETAMINOPHEN 650 MILLIGRAM(S): 325 TABLET ORAL at 12:55

## 2024-09-15 RX ADMIN — Medication 3 MILLIGRAM(S): at 22:29

## 2024-09-15 RX ADMIN — AMLODIPINE BESYLATE 5 MILLIGRAM(S): 10 TABLET ORAL at 06:02

## 2024-09-15 RX ADMIN — Medication 15 MILLIGRAM(S): at 22:30

## 2024-09-15 RX ADMIN — LATANOPROST 1 DROP(S): 50 SOLUTION OPHTHALMIC at 22:37

## 2024-09-15 RX ADMIN — Medication 2 TABLET(S): at 22:29

## 2024-09-15 RX ADMIN — ACETAMINOPHEN 650 MILLIGRAM(S): 325 TABLET ORAL at 11:55

## 2024-09-15 RX ADMIN — MEMANTINE 10 MILLIGRAM(S): 7 CAPSULE, EXTENDED RELEASE ORAL at 17:32

## 2024-09-15 RX ADMIN — Medication 10 MILLIGRAM(S): at 22:30

## 2024-09-15 RX ADMIN — Medication 5000 UNIT(S): at 17:32

## 2024-09-15 RX ADMIN — Medication 25 MILLIGRAM(S): at 06:02

## 2024-09-15 RX ADMIN — MEMANTINE 10 MILLIGRAM(S): 7 CAPSULE, EXTENDED RELEASE ORAL at 06:02

## 2024-09-15 NOTE — DISCHARGE NOTE PROVIDER - NSDCCPCAREPLAN_GEN_ALL_CORE_FT
PRINCIPAL DISCHARGE DIAGNOSIS  Diagnosis: Right hip pain  Assessment and Plan of Treatment: You presented with right hip pain after a fall   Imaging of your head and hip were negative for any acute findings   You were noted to have orthostatic hypotension during admission and received IV fluids   Continue oral hydration   Continue with fall precautions   Follow up with your PCP within one week for further outpatient management

## 2024-09-15 NOTE — DISCHARGE NOTE PROVIDER - NSDCMRMEDTOKEN_GEN_ALL_CORE_FT
acetaminophen 500 mg oral tablet: 1 tab(s) orally 3 times a day  amLODIPine 5 mg oral tablet: 1 tab(s) orally once a day  atorvastatin 10 mg oral tablet: 1 tab(s) orally once a day (at bedtime)  docusate sodium 100 mg oral capsule: 1 cap(s) orally once a day  Hydrocortisone acetate 25mg suppository rectal: 1 suppository rectally 2 times a day as needed for hemorrhoids  latanoprost 0.005% ophthalmic solution: 1 drop(s) to each affected eye once a day (in the evening)  melatonin 5 mg oral tablet: 1 tab(s) orally once a day (at bedtime)  memantine 10 mg oral tablet: 1 tab(s) orally every 12 hours  memantine 28 mg oral capsule, extended release: 1 cap(s) orally once a day at 12pm  mirtazapine 45 mg oral tablet, disintegratin tab(s) orally once a day (at bedtime)  omeprazole 20 mg oral delayed release tablet: 1 tab(s) orally once a day before breakfast  PreserVision AREDS 2 oral capsule: 1 cap(s) orally once a day  QUEtiapine 25 mg oral tablet: 1 tab(s) orally 2 times a day  senna leaf extract oral tablet: 2 tab(s) orally once a day (at bedtime) HOLD FOR LOOSE STOOLS  sertraline 50 mg oral tablet: 1 tab(s) orally once a day  Vitamin D3 50 mcg (2000 intl units) oral tablet: 1 tab(s) orally once a day   acetaminophen 500 mg oral tablet: 1 tab(s) orally 3 times a day  amLODIPine 5 mg oral tablet: 1 tab(s) orally once a day  atorvastatin 10 mg oral tablet: 1 tab(s) orally once a day (at bedtime)  docusate sodium 100 mg oral capsule: 1 cap(s) orally once a day  Hydrocortisone acetate 25mg suppository rectal: 1 suppository rectally 2 times a day as needed for hemorrhoids  latanoprost 0.005% ophthalmic solution: 1 drop(s) to each affected eye once a day (in the evening)  melatonin 5 mg oral tablet: 1 tab(s) orally once a day (at bedtime)  memantine 10 mg oral tablet: 1 tab(s) orally every 12 hours  memantine 28 mg oral capsule, extended release: 1 cap(s) orally once a day at 12pm  mirtazapine 45 mg oral tablet, disintegratin tab(s) orally once a day (at bedtime)  omeprazole 20 mg oral delayed release tablet: 1 tab(s) orally once a day before breakfast  Physical Therapy: Evaluate and treat  PreserVision AREDS 2 oral capsule: 1 cap(s) orally once a day  QUEtiapine 25 mg oral tablet: 1 tab(s) orally 2 times a day  senna leaf extract oral tablet: 2 tab(s) orally once a day (at bedtime) HOLD FOR LOOSE STOOLS  sertraline 50 mg oral tablet: 1 tab(s) orally once a day  Vitamin D3 50 mcg (2000 intl units) oral tablet: 1 tab(s) orally once a day

## 2024-09-15 NOTE — DISCHARGE NOTE PROVIDER - HOSPITAL COURSE
HPI:  : 89 yo F        with hx dementia GERD, HLD,   , depression        p/w weakness and difficulty ambulating..  after fall  at facility      patient presents for fall    pt  found  on floor,   at living facility [facility] after fall    pt    does not recall fall patient endorsing pain at right hip      patient denies headache neck pain back pain shoulder pain arm pain leg pain.       denies fevers chills nausea vomiting shortness of breath dysuria.     (12 Sep 2024 09:24)    Hospital Course:  Patient admitted after a fall. CT head was negative for any acute findings and xray pelvis was negative for fractures or dislocations. Patient noted to be orthostatic. Cardiology consulted; patient received IV fluids during admission. Encourage oral intake.   Patient evaluated by physical therapy and recommended for home PT    Important Medication Changes and Reason:  >Continue all medication per med rec     Active or Pending Issues Requiring Follow-up:  >PCP follow up within one week for further outpatient management     Advanced Directives:   [X] Full code  [ ] DNR  [ ] Hospice    Discharge Diagnoses:  >Fall    Discharge/dispo/med rec discussed with medical attending Dr. South. Patient is medically cleared for discharge with outpatient follow up         HPI:  : 89 yo F        with hx dementia GERD, HLD,   , depression        p/w weakness and difficulty ambulating..  after fall  at facility      patient presents for fall    pt  found  on floor,   at living facility [facility] after fall    pt    does not recall fall patient endorsing pain at right hip      patient denies headache neck pain back pain shoulder pain arm pain leg pain.       denies fevers chills nausea vomiting shortness of breath dysuria.     (12 Sep 2024 09:24)    Hospital Course:  Patient admitted after a fall. CT head was negative for any acute findings and xray pelvis was negative for fractures or dislocations. Patient noted to be orthostatic. Cardiology consulted; patient received IV fluids during admission. Encourage oral intake. Orthostatics improved, pt is asymptomatic.    Patient evaluated by physical therapy and recommended for home PT.   Discharge discussed with attending. Pt is medically stable and cleared for discharge by attending Dr. South.    Important Medication Changes and Reason:  >Continue all medication per med rec     Active or Pending Issues Requiring Follow-up:  >PCP follow up within one week for further outpatient management     Advanced Directives:   [X] Full code  [ ] DNR  [ ] Hospice    Discharge Diagnoses:  >Fall    Discharge/dispo/med rec discussed with medical attending Dr. South. Patient is medically cleared for discharge with outpatient follow up

## 2024-09-15 NOTE — DISCHARGE NOTE PROVIDER - ATTENDING ATTESTATION STATEMENT
I have personally seen and examined the patient. I have collaborated with and supervised the
02-Feb-2022

## 2024-09-15 NOTE — DISCHARGE NOTE PROVIDER - CARE PROVIDER_API CALL
[facility]  [address] [place] [address]  Phone: [phone]  Fax: [phone]  Follow Up Time: 1 week

## 2024-09-16 ENCOUNTER — TRANSCRIPTION ENCOUNTER (OUTPATIENT)
Age: 89
End: 2024-09-16

## 2024-09-16 VITALS
HEART RATE: 79 BPM | RESPIRATION RATE: 18 BRPM | TEMPERATURE: 98 F | SYSTOLIC BLOOD PRESSURE: 93 MMHG | DIASTOLIC BLOOD PRESSURE: 57 MMHG | OXYGEN SATURATION: 98 %

## 2024-09-16 RX ORDER — SODIUM CHLORIDE 9 MG/ML
250 INJECTION INTRAMUSCULAR; INTRAVENOUS; SUBCUTANEOUS ONCE
Refills: 0 | Status: COMPLETED | OUTPATIENT
Start: 2024-09-16 | End: 2024-09-16

## 2024-09-16 RX ADMIN — Medication 25 MILLIGRAM(S): at 05:13

## 2024-09-16 RX ADMIN — Medication 5000 UNIT(S): at 05:14

## 2024-09-16 RX ADMIN — AMLODIPINE BESYLATE 5 MILLIGRAM(S): 10 TABLET ORAL at 05:13

## 2024-09-16 RX ADMIN — MEMANTINE 10 MILLIGRAM(S): 7 CAPSULE, EXTENDED RELEASE ORAL at 05:13

## 2024-09-16 RX ADMIN — SERTRALINE HYDROCHLORIDE 50 MILLIGRAM(S): 50 TABLET, FILM COATED ORAL at 11:10

## 2024-09-16 RX ADMIN — SODIUM CHLORIDE 125 MILLILITER(S): 9 INJECTION INTRAMUSCULAR; INTRAVENOUS; SUBCUTANEOUS at 11:26

## 2024-09-16 NOTE — PROGRESS NOTE ADULT - PROVIDER SPECIALTY LIST ADULT
Cardiology
Family Medicine
Family Medicine
Cardiology
Family Medicine
Family Medicine

## 2024-09-16 NOTE — DISCHARGE NOTE NURSING/CASE MANAGEMENT/SOCIAL WORK - NSDCVIVACCINE_GEN_ALL_CORE_FT
Influenza, high-dose, trivalent, preservative free; 14-Sep-2024 11:14; Clive Kapoor (RN); [employer]; [id number] (Exp. Date: 06-Jun-2026); IntraMuscular; Deltoid Left.; 0.5 milliLiter(s); VIS (VIS Published: 06-Aug-2021, VIS Presented: 14-Sep-2024);   Tdap; 11-Jan-2022 19:55; Evelia Oreilly (RN); [employer]; [id number] (Exp. Date: 09-Sep-2023); IntraMuscular; Deltoid Right.; 0.5 milliLiter(s); VIS (VIS Published: 09-May-2013, VIS Presented: 11-Jan-2022);

## 2024-09-16 NOTE — DISCHARGE NOTE NURSING/CASE MANAGEMENT/SOCIAL WORK - NSDCPEFALRISK_GEN_ALL_CORE
For information on Fall & Injury Prevention, visit: [email or web address] OR  [email or web address] OR  https://www.cdc.gov/steadi/patient.html

## 2024-09-16 NOTE — PROGRESS NOTE ADULT - SUBJECTIVE AND OBJECTIVE BOX
---___---___---___---___---___---___ ---___---___---___---___---___---___---___---___---                  M E D I C A L   A T T E N D I N G   P R O G R E S S   N O T E  ---___---___---___---___---___---___ ---___---___---___---___---___---___---___---___---        ================================================    ++CHIEF COMPLAINT:   Patient is a 89y old  Female who presents with a chief complaint of fall (14 Sep 2024 10:16)      Pain of right hip      ---___---___---___---___---___---  PAST MEDICAL / Surgical  HISTORY:  PAST MEDICAL & SURGICAL HISTORY:  Depression      GERD (gastroesophageal reflux disease)      Hyperlipemia      Melanoma      Basal cell cancer      History of cholecystectomy      Hx of bilateral breast reduction surgery      Traumatic amputation of finger      S/P cataract surgery  left eye          ---___---___---___---___---___---  FAMILY HISTORY:   FAMILY HISTORY:  FH: breast cancer (Mother)          ---___---___---___---___---___---  ALLERGIES:   Allergies    Keflex (Other)  CT scan dye (Rhinitis)  iodine (Unknown)    Intolerances        ---___---___---___---___---___---  MEDICATIONS:  MEDICATIONS  (STANDING):  amLODIPine   Tablet 5 milliGRAM(s) Oral daily  atorvastatin 10 milliGRAM(s) Oral at bedtime  heparin   Injectable 5000 Unit(s) SubCutaneous every 12 hours  latanoprost 0.005% Ophthalmic Solution 1 Drop(s) Both EYES at bedtime  melatonin 3 milliGRAM(s) Oral at bedtime  memantine 10 milliGRAM(s) Oral every 12 hours  mirtazapine Soltab 15 milliGRAM(s) Oral at bedtime  QUEtiapine 25 milliGRAM(s) Oral two times a day  senna 2 Tablet(s) Oral at bedtime  sertraline 50 milliGRAM(s) Oral daily  sodium chloride 0.9%. 500 milliLiter(s) (50 mL/Hr) IV Continuous <Continuous>    MEDICATIONS  (PRN):  guaiFENesin  milliGRAM(s) Oral every 12 hours PRN Cough      ---___---___---___---___---___---  REVIEW OF SYSTEM:    GEN: no fever, no chills, no pain  RESP: no SOB, no cough, no sputum  CVS: no chest pain, no palpitations, no edema  GI: no abdominal pain, no nausea, no vomiting, no constipation, no diarrhea  : no dysurea, no frequency, no hematurea  Neuro: no headache, no dizziness  PSYCH: no anxiety, no depression  Derm : no itching, no rash    ---___---___---___---___---___---  VITAL SIGNS:  89y , CAPILLARY BLOOD GLUCOSE        T(C): 36.9 (09-14-24 @ 20:50), Max: 37.2 (09-14-24 @ 04:29)  HR: 72 (09-14-24 @ 20:50) (69 - 77)  BP: 134/68 (09-14-24 @ 20:50) (115/53 - 150/61)  RR: 18 (09-14-24 @ 20:50) (18 - 18)  SpO2: 95% (09-14-24 @ 20:50) (94% - 95%)  ---___---___---___---___---___---  PHYSICAL EXAM:    GEN: A&O X 0, NAD , comfortable  HEENT: NCAT, PERRL, MMM, hearing intact  Neck: supple , no JVD  CVS: S1S2 , regular , No M/R/G appreciated  PULM: CTA B/L,  no W/R/R appreciated  ABD.: soft. non tender, non distended,  bowel sounds present  Extrem: intact pulses , no edema   Derm: No rash , no ecchymoses  PSYCH : normal mood,  no delusion not anxious     ---___---___---___---___---___---            LAB AND IMAGING:                                                  [All pertinent / recent Imaging reviewed]         ---___---___---___---___---___---___ ---___---___---___---___---                         A S S E S S M E N T   A N D   P L A N :      HEALTH ISSUES - PROBLEM Dx:  unwitnessed fall    start dc planning to assisted living   hld on statin  continue memantine  on zoloft  and seroquel  ___  Thank you,  Abraham South  [phone]
Date of Service: 09-13-24 @ 08:18           CARDIOLOGY     PROGRESS  NOTE   ________________________________________________    CHIEF COMPLAINT:Patient is a 89y old  Female who presents with a chief complaint of fall (12 Sep 2024 20:53)  comfortable  	  REVIEW OF SYSTEMS:  CONSTITUTIONAL: No fever, weight loss, or fatigue  EYES: No eye pain, visual disturbances, or discharge  ENT:  No difficulty hearing, tinnitus, vertigo; No sinus or throat pain  NECK: No pain or stiffness  RESPIRATORY: No cough, wheezing, chills or hemoptysis; No Shortness of Breath  CARDIOVASCULAR: No chest pain, palpitations, passing out, dizziness, or leg swelling  GASTROINTESTINAL: No abdominal or epigastric pain. No nausea, vomiting, or hematemesis; No diarrhea or constipation. No melena or hematochezia.  GENITOURINARY: No dysuria, frequency, hematuria, or incontinence  NEUROLOGICAL: No headaches, memory loss, loss of strength, numbness, or tremors  SKIN: No itching, burning, rashes, or lesions   LYMPH Nodes: No enlarged glands  ENDOCRINE: No heat or cold intolerance; No hair loss  MUSCULOSKELETAL: No joint pain or swelling; No muscle, back, or extremity pain  PSYCHIATRIC: No depression, anxiety, mood swings, or difficulty sleeping  HEME/LYMPH: No easy bruising, or bleeding gums  ALLERGY AND IMMUNOLOGIC: No hives or eczema	    [ ] All others negative	  x[ ] Unable to obtain    PHYSICAL EXAM:  T(C): 37.1 (09-13-24 @ 04:29), Max: 37.4 (09-12-24 @ 21:10)  HR: 69 (09-13-24 @ 04:29) (69 - 86)  BP: 141/56 (09-13-24 @ 04:29) (113/60 - 142/76)  RR: 18 (09-13-24 @ 04:29) (18 - 19)  SpO2: 93% (09-13-24 @ 04:29) (90% - 98%)  Wt(kg): --  I&O's Summary    12 Sep 2024 07:01  -  13 Sep 2024 07:00  --------------------------------------------------------  IN: 240 mL / OUT: 0 mL / NET: 240 mL        Appearance: Normal	  HEENT:   Normal oral mucosa, PERRL, EOMI	  Lymphatic: No lymphadenopathy  Cardiovascular: Normal S1 S2, No JVD, + murmurs, No edema  Respiratory: rhonchi  Psychiatry: dementia  Gastrointestinal:  Soft, Non-tender, + BS	  Skin: No rashes, No ecchymoses, No cyanosis	  Neurologic: can not asses  Extremities: Normal range of motion, No clubbing, cyanosis or edema  Vascular: Peripheral pulses palpable 2+ bilaterally    MEDICATIONS  (STANDING):  amLODIPine   Tablet 5 milliGRAM(s) Oral daily  atorvastatin 10 milliGRAM(s) Oral at bedtime  heparin   Injectable 5000 Unit(s) SubCutaneous every 12 hours  influenza  Vaccine (HIGH DOSE) 0.5 milliLiter(s) IntraMuscular once  latanoprost 0.005% Ophthalmic Solution 1 Drop(s) Both EYES at bedtime  melatonin 3 milliGRAM(s) Oral at bedtime  memantine 10 milliGRAM(s) Oral every 12 hours  mirtazapine Soltab 15 milliGRAM(s) Oral at bedtime  QUEtiapine 25 milliGRAM(s) Oral two times a day  senna 2 Tablet(s) Oral at bedtime  sertraline 50 milliGRAM(s) Oral daily      TELEMETRY: 	    ECG:  	  RADIOLOGY:  OTHER: 	  	  LABS:	 	    CARDIAC MARKERS:                                11.7   11.59 )-----------( 186      ( 11 Sep 2024 22:11 )             37.5     09-11    139  |  104  |  27<H>  ----------------------------<  117<H>  4.4   |  23  |  1.15    Ca    11.3<H>      11 Sep 2024 22:11    TPro  7.1  /  Alb  3.8  /  TBili  0.3  /  DBili  x   /  AST  100<H>  /  ALT  78<H>  /  AlkPhos  119  09-11    proBNP:   Lipid Profile:   HgA1c:   TSH:         Assessment and plan  ---------------------------   89 yo F with hx dementia GERD, HLD, carotid stenosis, depression p/w weakness and difficulty ambulating. Patient patient presents for fall  Patient was found down at living facility [facility] after fall.  Patient does not recall fall patient endorsing pain at right hip patient denies headache neck pain back pain shoulder pain arm pain leg pain.  Patient denies fevers chills nausea vomiting shortness of breath dysuria.  pt is well known to me  syncope / fal ?sec to carotid stenosis/ orthostatic hypotension  pt is high risk candidate for fall  check calcium level  physical therapy  dvt prophylaxis  increase fluid intake, re check orthosttaic  awaiting orthosattaic  encourage po fluis    	        
      ---___---___---___---___---___---___ ---___---___---___---___---___---___---___---___---                  M E D I C A L   A T T E N D I N G   P R O G R E S S   N O T E  ---___---___---___---___---___---___ ---___---___---___---___---___---___---___---___---        ================================================    ++CHIEF COMPLAINT:   Patient is a 89y old  Female who presents with a chief complaint of fall (12 Sep 2024 19:42)      Pain of right hip      ---___---___---___---___---___---  PAST MEDICAL / Surgical  HISTORY:  PAST MEDICAL & SURGICAL HISTORY:  Depression      GERD (gastroesophageal reflux disease)      Hyperlipemia      Melanoma      Basal cell cancer      History of cholecystectomy      Hx of bilateral breast reduction surgery      Traumatic amputation of finger      S/P cataract surgery  left eye          ---___---___---___---___---___---  FAMILY HISTORY:   FAMILY HISTORY:  FH: breast cancer (Mother)          ---___---___---___---___---___---  ALLERGIES:   Allergies    Keflex (Other)  CT scan dye (Rhinitis)  iodine (Unknown)    Intolerances        ---___---___---___---___---___---  MEDICATIONS:  MEDICATIONS  (STANDING):  amLODIPine   Tablet 5 milliGRAM(s) Oral daily  atorvastatin 10 milliGRAM(s) Oral at bedtime  heparin   Injectable 5000 Unit(s) SubCutaneous every 12 hours  influenza  Vaccine (HIGH DOSE) 0.5 milliLiter(s) IntraMuscular once  latanoprost 0.005% Ophthalmic Solution 1 Drop(s) Both EYES at bedtime  melatonin 3 milliGRAM(s) Oral at bedtime  memantine 10 milliGRAM(s) Oral every 12 hours  mirtazapine Soltab 15 milliGRAM(s) Oral at bedtime  QUEtiapine 25 milliGRAM(s) Oral two times a day  senna 2 Tablet(s) Oral at bedtime  sertraline 50 milliGRAM(s) Oral daily    MEDICATIONS  (PRN):      ---___---___---___---___---___---  REVIEW OF SYSTEM:    GEN: no fever, no chills, no pain  RESP: no SOB, no cough, no sputum  CVS: no chest pain, no palpitations, no edema  GI: no abdominal pain, no nausea, no vomiting, no constipation, no diarrhea  : no dysurea, no frequency, no hematurea  Neuro: no headache, no dizziness  PSYCH: no anxiety, no depression  Derm : no itching, no rash    ---___---___---___---___---___---  VITAL SIGNS:  89y , CAPILLARY BLOOD GLUCOSE        T(C): 36.9 (24 @ 12:56), Max: 37.4 (24 @ 02:15)  HR: 77 (24 @ 12:56) (77 - 92)  BP: 142/76 (24 @ 12:56) (105/73 - 157/71)  RR: 18 (24 @ 12:56) (15 - 18)  SpO2: 98% (24 @ 12:56) (92% - 98%)  ---___---___---___---___---___---  PHYSICAL EXAM:    GEN: A&O X 3 , NAD , comfortable  HEENT: NCAT, PERRL, MMM, hearing intact  Neck: supple , no JVD  CVS: S1S2 , regular , No M/R/G appreciated  PULM: CTA B/L,  no W/R/R appreciated  ABD.: soft. non tender, non distended,  bowel sounds present  Extrem: intact pulses , no edema   Derm: No rash , no ecchymoses  PSYCH : normal mood,  no delusion not anxious     ---___---___---___---___---___---            LAB AND IMAGIN.7   11.59 )-----------( 186      ( 11 Sep 2024 22:11 )             37.5                   139  |  104  |  27<H>  ----------------------------<  117<H>  4.4   |  23  |  1.15    Ca    11.3<H>      11 Sep 2024 22:11    TPro  7.1  /  Alb  3.8  /  TBili  0.3  /  DBili  x   /  AST  100<H>  /  ALT  78<H>  /  AlkPhos  119                              Urinalysis Basic - ( 11 Sep 2024 22:11 )    Color: x / Appearance: x / SG: x / pH: x  Gluc: 117 mg/dL / Ketone: x  / Bili: x / Urobili: x   Blood: x / Protein: x / Nitrite: x   Leuk Esterase: x / RBC: x / WBC x   Sq Epi: x / Non Sq Epi: x / Bacteria: x        [All pertinent / recent Imaging reviewed]         ---___---___---___---___---___---___ ---___---___---___---___---                         A S S E S S M E N T   A N D   P L A N :      HEALTH ISSUES - PROBLEM Dx:  patient presents for fall,  was  found  on floor,   at living facility [facility].    pt   does not recall fall patient endorsing pain at right hip    denies headache neck pain back pain shoulder pain arm pain leg pain./  fevers chills nausea vomiting shortness of breath dysuria.    fall.  unwitnessed.  ?  syncope         wbc noted,  is  afebrile          cxr, opacities,  prior  ct  ,  basilar  fibrotic  changes           check  orthostatics     ct   head, , no  bleed.  xray,  no  plevic  fx    HLD   Depression    dvt  ppx/  fall  risk         ___________________________  Thank you,  Abraham South  [phone]
      ---___---___---___---___---___---___ ---___---___---___---___---___---___---___---___---                  M E D I C A L   A T T E N D I N G   P R O G R E S S   N O T E  ---___---___---___---___---___---___ ---___---___---___---___---___---___---___---___---        ================================================    ++CHIEF COMPLAINT:   Patient is a 89y old  Female who presents with a chief complaint of fall (15 Sep 2024 13:21)      Pain of right hip      ---___---___---___---___---___---  PAST MEDICAL / Surgical  HISTORY:  PAST MEDICAL & SURGICAL HISTORY:  Depression      GERD (gastroesophageal reflux disease)      Hyperlipemia      Melanoma      Basal cell cancer      History of cholecystectomy      Hx of bilateral breast reduction surgery      Traumatic amputation of finger      S/P cataract surgery  left eye          ---___---___---___---___---___---  FAMILY HISTORY:   FAMILY HISTORY:  FH: breast cancer (Mother)          ---___---___---___---___---___---  ALLERGIES:   Allergies    Keflex (Other)  CT scan dye (Rhinitis)  iodine (Unknown)    Intolerances        ---___---___---___---___---___---  MEDICATIONS:  MEDICATIONS  (STANDING):  amLODIPine   Tablet 5 milliGRAM(s) Oral daily  atorvastatin 10 milliGRAM(s) Oral at bedtime  heparin   Injectable 5000 Unit(s) SubCutaneous every 12 hours  latanoprost 0.005% Ophthalmic Solution 1 Drop(s) Both EYES at bedtime  melatonin 3 milliGRAM(s) Oral at bedtime  memantine 10 milliGRAM(s) Oral every 12 hours  mirtazapine Soltab 15 milliGRAM(s) Oral at bedtime  QUEtiapine 25 milliGRAM(s) Oral two times a day  senna 2 Tablet(s) Oral at bedtime  sertraline 50 milliGRAM(s) Oral daily  sodium chloride 0.9%. 500 milliLiter(s) (50 mL/Hr) IV Continuous <Continuous>    MEDICATIONS  (PRN):  acetaminophen     Tablet .. 650 milliGRAM(s) Oral every 6 hours PRN Mild Pain (1 - 3)  guaiFENesin  milliGRAM(s) Oral every 12 hours PRN Cough      ---___---___---___---___---___---  REVIEW OF SYSTEM:    unable to obtain    ---___---___---___---___---___---  VITAL SIGNS:  89y , CAPILLARY BLOOD GLUCOSE        T(C): 36.4 (09-15-24 @ 12:05), Max: 36.9 (24 @ 20:50)  HR: 75 (09-15-24 @ 12:05) (67 - 75)  BP: 158/75 (09-15-24 @ 12:05) (134/68 - 158/75)  RR: 18 (09-15-24 @ 12:05) (18 - 18)  SpO2: 87% (09-15-24 @ 12:05) (87% - 95%)  ---___---___---___---___---___---  PHYSICAL EXAM:    GEN: A&O X 3 , NAD , comfortable  HEENT: NCAT, PERRL, MMM, hearing intact  Neck: supple , no JVD  CVS: S1S2 , regular , No M/R/G appreciated  PULM: CTA B/L,  no W/R/R appreciated  ABD.: soft. non tender, non distended,  bowel sounds present  Extrem: intact pulses , no edema   Derm: No rash , no ecchymoses  PSYCH : normal mood,  no delusion not anxious     ---___---___---___---___---___---            LAB AND IMAGIN.3   7.94  )-----------( 174      ( 15 Sep 2024 06:55 )             34.9               09-15    141  |  107  |  15  ----------------------------<  84  4.2   |  25  |  1.00    Ca    11.3<H>      15 Sep 2024 06:55                              Urinalysis Basic - ( 15 Sep 2024 06:55 )    Color: x / Appearance: x / SG: x / pH: x  Gluc: 84 mg/dL / Ketone: x  / Bili: x / Urobili: x   Blood: x / Protein: x / Nitrite: x   Leuk Esterase: x / RBC: x / WBC x   Sq Epi: x / Non Sq Epi: x / Bacteria: x        [All pertinent / recent Imaging reviewed]         ---___---___---___---___---___---___ ---___---___---___---___---                         A S S E S S M E N T   A N D   P L A N :      HEALTH ISSUES - PROBLEM Dx:    unwitnessed fall    start dc planning to assisted living   hld on statin  continue memantine  on zoloft  and seroquel    -GI/DVT Prophylaxis.   as ordered  ___________________________  Thank you,  Abraham South  [phone]
Date of Service: 09-15-24 @ 08:40           CARDIOLOGY     PROGRESS  NOTE   ________________________________________________    CHIEF COMPLAINT:Patient is a 89y old  Female who presents with a chief complaint of fall (14 Sep 2024 21:10)  no complain  	  REVIEW OF SYSTEMS:  CONSTITUTIONAL: No fever, weight loss, or fatigue  EYES: No eye pain, visual disturbances, or discharge  ENT:  No difficulty hearing, tinnitus, vertigo; No sinus or throat pain  NECK: No pain or stiffness  RESPIRATORY: No cough, wheezing, chills or hemoptysis; No Shortness of Breath  CARDIOVASCULAR: No chest pain, palpitations, passing out, dizziness, or leg swelling  GASTROINTESTINAL: No abdominal or epigastric pain. No nausea, vomiting, or hematemesis; No diarrhea or constipation. No melena or hematochezia.  GENITOURINARY: No dysuria, frequency, hematuria, or incontinence  NEUROLOGICAL: No headaches, memory loss, loss of strength, numbness, or tremors  SKIN: No itching, burning, rashes, or lesions   LYMPH Nodes: No enlarged glands  ENDOCRINE: No heat or cold intolerance; No hair loss  MUSCULOSKELETAL: No joint pain or swelling; No muscle, back, or extremity pain  PSYCHIATRIC: No depression, anxiety, mood swings, or difficulty sleeping  HEME/LYMPH: No easy bruising, or bleeding gums  ALLERGY AND IMMUNOLOGIC: No hives or eczema	    [ ] All others negative	  [ x] Unable to obtain    PHYSICAL EXAM:  T(C): 36.9 (09-15-24 @ 04:46), Max: 36.9 (09-14-24 @ 20:50)  HR: 67 (09-15-24 @ 04:46) (67 - 72)  BP: 141/60 (09-15-24 @ 04:46) (115/53 - 141/60)  RR: 18 (09-15-24 @ 04:46) (18 - 18)  SpO2: 91% (09-15-24 @ 04:46) (91% - 95%)  Wt(kg): --  I&O's Summary      Appearance: Normal	  HEENT:   Normal oral mucosa, PERRL, EOMI	  Lymphatic: No lymphadenopathy  Cardiovascular: Normal S1 S2, No JVD, +rmurs, No edema  Respiratory: rhonchi  Psychiatry: dementia  Gastrointestinal:  Soft, Non-tender, + BS	  Skin: No rashes, No ecchymoses, No cyanosis	  Extremities: Normal range of motion, No clubbing, cyanosis or edema  Vascular: Peripheral pulses palpable 2+ bilaterally    MEDICATIONS  (STANDING):  amLODIPine   Tablet 5 milliGRAM(s) Oral daily  atorvastatin 10 milliGRAM(s) Oral at bedtime  heparin   Injectable 5000 Unit(s) SubCutaneous every 12 hours  latanoprost 0.005% Ophthalmic Solution 1 Drop(s) Both EYES at bedtime  melatonin 3 milliGRAM(s) Oral at bedtime  memantine 10 milliGRAM(s) Oral every 12 hours  mirtazapine Soltab 15 milliGRAM(s) Oral at bedtime  QUEtiapine 25 milliGRAM(s) Oral two times a day  senna 2 Tablet(s) Oral at bedtime  sertraline 50 milliGRAM(s) Oral daily  sodium chloride 0.9%. 500 milliLiter(s) (50 mL/Hr) IV Continuous <Continuous>      TELEMETRY: 	    ECG:  	  RADIOLOGY:  OTHER: 	  	  LABS:	 	    CARDIAC MARKERS:                            11.3   7.94  )-----------( 174      ( 15 Sep 2024 06:55 )             34.9     09-15    141  |  107  |  15  ----------------------------<  84  4.2   |  25  |  1.00    Ca    11.3<H>      15 Sep 2024 06:55      proBNP:   Lipid Profile:   HgA1c:   TSH:         Assessment and plan  ---------------------------   89 yo F with hx dementia GERD, HLD, carotid stenosis, depression p/w weakness and difficulty ambulating. Patient patient presents for fall  Patient was found down at living facility [facility] after fall.  Patient does not recall fall patient endorsing pain at right hip patient denies headache neck pain back pain shoulder pain arm pain leg pain.  Patient denies fevers chills nausea vomiting shortness of breath dysuria.  pt is well known to me  syncope / fal ?sec to carotid stenosis/ orthostatic hypotension  pt is high risk candidate for fall  check calcium level  physical therapy  dvt prophylaxis  increase fluid intake, re check orthostatic  encourage po fluid  significantly orthostatic, will start on ivf follow up orthostatic    	        
Date of Service: 09-16-24 @ 07:58           CARDIOLOGY     PROGRESS  NOTE   ________________________________________________    CHIEF COMPLAINT:Patient is a 89y old  Female who presents with a chief complaint of fall (15 Sep 2024 20:32)  comfortable  	  REVIEW OF SYSTEMS:  CONSTITUTIONAL: No fever, weight loss, or fatigue  EYES: No eye pain, visual disturbances, or discharge  ENT:  No difficulty hearing, tinnitus, vertigo; No sinus or throat pain  NECK: No pain or stiffness  RESPIRATORY: No cough, wheezing, chills or hemoptysis; No Shortness of Breath  CARDIOVASCULAR: No chest pain, palpitations, passing out, dizziness, or leg swelling  GASTROINTESTINAL: No abdominal or epigastric pain. No nausea, vomiting, or hematemesis; No diarrhea or constipation. No melena or hematochezia.  GENITOURINARY: No dysuria, frequency, hematuria, or incontinence  NEUROLOGICAL: No headaches, memory loss, loss of strength, numbness, or tremors  SKIN: No itching, burning, rashes, or lesions   LYMPH Nodes: No enlarged glands  ENDOCRINE: No heat or cold intolerance; No hair loss  MUSCULOSKELETAL: No joint pain or swelling; No muscle, back, or extremity pain  PSYCHIATRIC: No depression, anxiety, mood swings, or difficulty sleeping  HEME/LYMPH: No easy bruising, or bleeding gums  ALLERGY AND IMMUNOLOGIC: No hives or eczema	    [ ] All others negative	  [x ] Unable to obtain    PHYSICAL EXAM:  T(C): 36.9 (09-16-24 @ 04:19), Max: 36.9 (09-16-24 @ 04:19)  HR: 66 (09-16-24 @ 04:19) (64 - 75)  BP: 143/54 (09-16-24 @ 04:19) (136/81 - 158/75)  RR: 18 (09-16-24 @ 04:19) (18 - 18)  SpO2: 91% (09-16-24 @ 04:19) (87% - 95%)  Wt(kg): --  I&O's Summary      Appearance: Normal	  HEENT:   Normal oral mucosa, PERRL, EOMI	  Lymphatic: No lymphadenopathy  Cardiovascular: Normal S1 S2, No JVD, + murmurs, No edema  Respiratory:rhonchi  Psychiatry: dementia  Gastrointestinal:  Soft, Non-tender, + BS	  Skin: No rashes, No ecchymoses, No cyanosis	  Neurologic: Non-focal  Extremities: Normal range of motion, No clubbing, cyanosis or edema  Vascular: Peripheral pulses palpable 2+ bilaterally    MEDICATIONS  (STANDING):  amLODIPine   Tablet 5 milliGRAM(s) Oral daily  atorvastatin 10 milliGRAM(s) Oral at bedtime  heparin   Injectable 5000 Unit(s) SubCutaneous every 12 hours  latanoprost 0.005% Ophthalmic Solution 1 Drop(s) Both EYES at bedtime  melatonin 3 milliGRAM(s) Oral at bedtime  memantine 10 milliGRAM(s) Oral every 12 hours  mirtazapine Soltab 15 milliGRAM(s) Oral at bedtime  QUEtiapine 25 milliGRAM(s) Oral two times a day  senna 2 Tablet(s) Oral at bedtime  sertraline 50 milliGRAM(s) Oral daily  sodium chloride 0.9%. 500 milliLiter(s) (50 mL/Hr) IV Continuous <Continuous>      TELEMETRY: 	    ECG:  	  RADIOLOGY:  OTHER: 	  	  LABS:	 	    CARDIAC MARKERS:                                11.3   7.94  )-----------( 174      ( 15 Sep 2024 06:55 )             34.9     09-15    141  |  107  |  15  ----------------------------<  84  4.2   |  25  |  1.00    Ca    11.3<H>      15 Sep 2024 06:55      proBNP:   Lipid Profile:   HgA1c:   TSH:         Assessment and plan  ---------------------------   89 yo F with hx dementia GERD, HLD, carotid stenosis, depression p/w weakness and difficulty ambulating. Patient patient presents for fall  Patient was found down at living facility [facility] after fall.  Patient does not recall fall patient endorsing pain at right hip patient denies headache neck pain back pain shoulder pain arm pain leg pain.  Patient denies fevers chills nausea vomiting shortness of breath dysuria.  pt is well known to me  syncope / fal ?sec to carotid stenosis/ orthostatic hypotension  pt is high risk candidate for fall  check calcium level  physical therapy  dvt prophylaxis  increase fluid intake, re check orthostatic  encourage po fluid  significantly orthostatic, will start on ivf follow up orthostatic  repeat orthostatic today  pt is ca high risk for falls  	        
      ---___---___---___---___---___---___ ---___---___---___---___---___---___---___---___---                  M E D I C A L   A T T E N D I N G   P R O G R E S S   N O T E  ---___---___---___---___---___---___ ---___---___---___---___---___---___---___---___---        ================================================    ++CHIEF COMPLAINT:   Patient is a 89y old  Female who presents with a chief complaint of fall (13 Sep 2024 08:18)      Pain of right hip      ---___---___---___---___---___---  PAST MEDICAL / Surgical  HISTORY:  PAST MEDICAL & SURGICAL HISTORY:  Depression      GERD (gastroesophageal reflux disease)      Hyperlipemia      Melanoma      Basal cell cancer      History of cholecystectomy      Hx of bilateral breast reduction surgery      Traumatic amputation of finger      S/P cataract surgery  left eye          ---___---___---___---___---___---  FAMILY HISTORY:   FAMILY HISTORY:  FH: breast cancer (Mother)          ---___---___---___---___---___---  ALLERGIES:   Allergies    Keflex (Other)  CT scan dye (Rhinitis)  iodine (Unknown)    Intolerances        ---___---___---___---___---___---  MEDICATIONS:  MEDICATIONS  (STANDING):  amLODIPine   Tablet 5 milliGRAM(s) Oral daily  atorvastatin 10 milliGRAM(s) Oral at bedtime  heparin   Injectable 5000 Unit(s) SubCutaneous every 12 hours  influenza  Vaccine (HIGH DOSE) 0.5 milliLiter(s) IntraMuscular once  latanoprost 0.005% Ophthalmic Solution 1 Drop(s) Both EYES at bedtime  melatonin 3 milliGRAM(s) Oral at bedtime  memantine 10 milliGRAM(s) Oral every 12 hours  mirtazapine Soltab 15 milliGRAM(s) Oral at bedtime  QUEtiapine 25 milliGRAM(s) Oral two times a day  senna 2 Tablet(s) Oral at bedtime  sertraline 50 milliGRAM(s) Oral daily    MEDICATIONS  (PRN):      ---___---___---___---___---___---  REVIEW OF SYSTEM:    GEN: no fever, no chills, no pain  RESP: no SOB, no cough, no sputum  CVS: no chest pain, no palpitations, no edema  GI: no abdominal pain, no nausea, no vomiting, no constipation, no diarrhea  : no dysurea, no frequency, no hematurea  Neuro: no headache, no dizziness  PSYCH: no anxiety, no depression  Derm : no itching, no rash    ---___---___---___---___---___---  VITAL SIGNS:  89y , CAPILLARY BLOOD GLUCOSE        T(C): 36.8 (24 @ 12:11), Max: 37.4 (24 @ 21:10)  HR: 87 (24 @ 12:11) (69 - 87)  BP: 134/66 (24 @ 12:11) (113/60 - 142/71)  RR: 18 (24 @ 12:18) (18 - 19)  SpO2: 93% (24 @ 12:18) (90% - 98%)  ---___---___---___---___---___---  PHYSICAL EXAM:    GEN: A&O X 3 , NAD , comfortable  HEENT: NCAT, PERRL, MMM, hearing intact  Neck: supple , no JVD  CVS: S1S2 , regular , No M/R/G appreciated  PULM: CTA B/L,  no W/R/R appreciated  ABD.: soft. non tender, non distended,  bowel sounds present  Extrem: intact pulses , no edema   Derm: No rash , no ecchymoses  PSYCH : normal mood,  no delusion not anxious     ---___---___---___---___---___---            LAB AND IMAGIN.7   11.59 )-----------( 186      ( 11 Sep 2024 22:11 )             37.5                   139  |  104  |  27<H>  ----------------------------<  117<H>  4.4   |  23  |  1.15    Ca    11.3<H>      11 Sep 2024 22:11    TPro  7.1  /  Alb  3.8  /  TBili  0.3  /  DBili  x   /  AST  100<H>  /  ALT  78<H>  /  AlkPhos  119                              Urinalysis Basic - ( 11 Sep 2024 22:11 )    Color: x / Appearance: x / SG: x / pH: x  Gluc: 117 mg/dL / Ketone: x  / Bili: x / Urobili: x   Blood: x / Protein: x / Nitrite: x   Leuk Esterase: x / RBC: x / WBC x   Sq Epi: x / Non Sq Epi: x / Bacteria: x        [All pertinent / recent Imaging reviewed]         ---___---___---___---___---___---___ ---___---___---___---___---                         A S S E S S M E N T   A N D   P L A N :      HEALTH ISSUES - PROBLEM Dx:  patient presents for fall,  was  found  on floor,   at living facility [facility].    pt   does not recall fall patient endorsing pain at right hip    denies headache neck pain back pain shoulder pain arm pain leg pain./  fevers chills nausea vomiting shortness of breath dysuria.    fall.  unwitnessed.  ?  syncope         wbc noted,  is  afebrile          cxr, opacities,  prior  ct  ,  basilar  fibrotic  changes           check  orthostatics     ct   head, , no  bleed.  xray,  no  plevic  fx    HLD   Depression    dvt  ppx/  fall  risk    awaiting placement back at assisted living with extra aids in place               -GI/DVT Prophylaxis.    --------------------------------------------  Case discussed with   Education given on   ___________________________  Thank you,  [place]  [phone]
Date of Service: 09-14-24 @ 10:17           CARDIOLOGY     PROGRESS  NOTE   ________________________________________________    CHIEF COMPLAINT:Patient is a 89y old  Female who presents with a chief complaint of fall (13 Sep 2024 15:24)  no complain  	  REVIEW OF SYSTEMS:  CONSTITUTIONAL: No fever, weight loss, or fatigue  EYES: No eye pain, visual disturbances, or discharge  ENT:  No difficulty hearing, tinnitus, vertigo; No sinus or throat pain  NECK: No pain or stiffness  RESPIRATORY: No cough, wheezing, chills or hemoptysis; No Shortness of Breath  CARDIOVASCULAR: No chest pain, palpitations, passing out, dizziness, or leg swelling  GASTROINTESTINAL: No abdominal or epigastric pain. No nausea, vomiting, or hematemesis; No diarrhea or constipation. No melena or hematochezia.  GENITOURINARY: No dysuria, frequency, hematuria, or incontinence  NEUROLOGICAL: No headaches, memory loss, loss of strength, numbness, or tremors  SKIN: No itching, burning, rashes, or lesions   LYMPH Nodes: No enlarged glands  ENDOCRINE: No heat or cold intolerance; No hair loss  MUSCULOSKELETAL: No joint pain or swelling; No muscle, back, or extremity pain  PSYCHIATRIC: No depression, anxiety, mood swings, or difficulty sleeping  HEME/LYMPH: No easy bruising, or bleeding gums  ALLERGY AND IMMUNOLOGIC: No hives or eczema	    [ ] All others negative	  [x ] Unable to obtain    PHYSICAL EXAM:  T(C): 37.2 (09-14-24 @ 04:29), Max: 37.2 (09-13-24 @ 20:37)  HR: 77 (09-14-24 @ 04:29) (77 - 87)  BP: 150/61 (09-14-24 @ 04:29) (120/68 - 150/61)  RR: 18 (09-14-24 @ 04:29) (18 - 18)  SpO2: 92% (09-13-24 @ 20:37) (92% - 98%)  Wt(kg): --  I&O's Summary      Appearance: Normal	  HEENT:   Normal oral mucosa, PERRL, EOMI	  Lymphatic: No lymphadenopathy  Cardiovascular: Normal S1 S2, No JVD, + murmurs, No edema  Respiratory: rhonchi  Psychiatry: dementia  Gastrointestinal:  Soft, Non-tender, + BS	  Skin: No rashes, No ecchymoses, No cyanosis	  Neurologic: Non-focal  Extremities: Normal range of motion, No clubbing, cyanosis or edema  Vascular: Peripheral pulses palpable 2+ bilaterally    MEDICATIONS  (STANDING):  amLODIPine   Tablet 5 milliGRAM(s) Oral daily  atorvastatin 10 milliGRAM(s) Oral at bedtime  heparin   Injectable 5000 Unit(s) SubCutaneous every 12 hours  influenza  Vaccine (HIGH DOSE) 0.5 milliLiter(s) IntraMuscular once  latanoprost 0.005% Ophthalmic Solution 1 Drop(s) Both EYES at bedtime  melatonin 3 milliGRAM(s) Oral at bedtime  memantine 10 milliGRAM(s) Oral every 12 hours  mirtazapine Soltab 15 milliGRAM(s) Oral at bedtime  QUEtiapine 25 milliGRAM(s) Oral two times a day  senna 2 Tablet(s) Oral at bedtime  sertraline 50 milliGRAM(s) Oral daily      TELEMETRY: 	    ECG:  	  RADIOLOGY:  OTHER: 	  	  LABS:	 	    CARDIAC MARKERS:      proBNP:   Lipid Profile:   HgA1c:   TSH:     < from: 12 Lead ECG (09.05.24 @ 21:03) >  Diagnosis Line NORMAL SINUS RHYTHM  LEFT VENTRICULAR HYPERTROPHY WITH REPOLARIZATION ABNORMALITY ( R in aVL , [place] product )  ABNORMAL ECG  WHEN COMPARED WITH ECG OF 03-MAY-2024 20:00,  , NO INTERVAL CHANGE    < end of copied text >      Assessment and plan  ---------------------------   89 yo F with hx dementia GERD, HLD, carotid stenosis, depression p/w weakness and difficulty ambulating. Patient patient presents for fall  Patient was found down at living facility [facility] after fall.  Patient does not recall fall patient endorsing pain at right hip patient denies headache neck pain back pain shoulder pain arm pain leg pain.  Patient denies fevers chills nausea vomiting shortness of breath dysuria.  pt is well known to me  syncope / fal ?sec to carotid stenosis/ orthostatic hypotension  pt is high risk candidate for fall  check calcium level  physical therapy  dvt prophylaxis  increase fluid intake, re check orthostatic  encourage po fluid  significantly orthostatic, will start on ivf

## 2024-09-16 NOTE — DISCHARGE NOTE NURSING/CASE MANAGEMENT/SOCIAL WORK - PATIENT PORTAL LINK FT
You can access the FollowMyHealth Patient Portal offered by [facility] by registering at the following website: [email or web address]. By joining [facility]’s FollowMyHealth portal, you will also be able to view your health information using other applications (apps) compatible with our system.

## 2024-09-19 ENCOUNTER — INPATIENT (INPATIENT)
Facility: HOSPITAL | Age: 89
LOS: 6 days | Discharge: HOME | End: 2024-09-26
Attending: HOSPITALIST | Admitting: HOSPITALIST
Payer: MEDICARE

## 2024-09-19 VITALS
OXYGEN SATURATION: 92 % | SYSTOLIC BLOOD PRESSURE: 136 MMHG | TEMPERATURE: 99 F | HEART RATE: 93 BPM | HEIGHT: 61 IN | RESPIRATION RATE: 18 BRPM | DIASTOLIC BLOOD PRESSURE: 66 MMHG

## 2024-09-19 DIAGNOSIS — R41.82 ALTERED MENTAL STATUS, UNSPECIFIED: ICD-10-CM

## 2024-09-19 LAB
ALBUMIN SERPL ELPH-MCNC: 4 G/DL — SIGNIFICANT CHANGE UP (ref 3.3–5)
ALP SERPL-CCNC: 103 U/L — SIGNIFICANT CHANGE UP (ref 40–120)
ALT FLD-CCNC: 30 U/L — SIGNIFICANT CHANGE UP (ref 4–33)
ANION GAP SERPL CALC-SCNC: 12 MMOL/L — SIGNIFICANT CHANGE UP (ref 7–14)
APPEARANCE UR: CLEAR — SIGNIFICANT CHANGE UP
AST SERPL-CCNC: 32 U/L — SIGNIFICANT CHANGE UP (ref 4–32)
BACTERIA # UR AUTO: NEGATIVE /HPF — SIGNIFICANT CHANGE UP
BILIRUB SERPL-MCNC: 0.3 MG/DL — SIGNIFICANT CHANGE UP (ref 0.2–1.2)
BILIRUB UR-MCNC: NEGATIVE — SIGNIFICANT CHANGE UP
BUN SERPL-MCNC: 19 MG/DL — SIGNIFICANT CHANGE UP (ref 7–23)
CALCIUM SERPL-MCNC: 11.8 MG/DL — HIGH (ref 8.4–10.5)
CAST: 0 /LPF — SIGNIFICANT CHANGE UP (ref 0–4)
CHLORIDE SERPL-SCNC: 108 MMOL/L — HIGH (ref 98–107)
CO2 SERPL-SCNC: 22 MMOL/L — SIGNIFICANT CHANGE UP (ref 22–31)
COLOR SPEC: YELLOW — SIGNIFICANT CHANGE UP
CREAT SERPL-MCNC: 1.1 MG/DL — SIGNIFICANT CHANGE UP (ref 0.5–1.3)
DIFF PNL FLD: NEGATIVE — SIGNIFICANT CHANGE UP
EGFR: 46 ML/MIN/1.73M2 — LOW
FLUAV AG NPH QL: SIGNIFICANT CHANGE UP
FLUBV AG NPH QL: SIGNIFICANT CHANGE UP
GLUCOSE SERPL-MCNC: 102 MG/DL — HIGH (ref 70–99)
GLUCOSE UR QL: NEGATIVE MG/DL — SIGNIFICANT CHANGE UP
HCT VFR BLD CALC: 38.5 % — SIGNIFICANT CHANGE UP (ref 34.5–45)
HGB BLD-MCNC: 12.7 G/DL — SIGNIFICANT CHANGE UP (ref 11.5–15.5)
KETONES UR-MCNC: NEGATIVE MG/DL — SIGNIFICANT CHANGE UP
LEUKOCYTE ESTERASE UR-ACNC: ABNORMAL
MAGNESIUM SERPL-MCNC: 2.5 MG/DL — SIGNIFICANT CHANGE UP (ref 1.6–2.6)
MCHC RBC-ENTMCNC: 31.1 PG — SIGNIFICANT CHANGE UP (ref 27–34)
MCHC RBC-ENTMCNC: 33 GM/DL — SIGNIFICANT CHANGE UP (ref 32–36)
MCV RBC AUTO: 94.1 FL — SIGNIFICANT CHANGE UP (ref 80–100)
NITRITE UR-MCNC: NEGATIVE — SIGNIFICANT CHANGE UP
NRBC # BLD: 0 /100 WBCS — SIGNIFICANT CHANGE UP (ref 0–0)
NRBC # FLD: 0 K/UL — SIGNIFICANT CHANGE UP (ref 0–0)
PH UR: 7 — SIGNIFICANT CHANGE UP (ref 5–8)
PHOSPHATE SERPL-MCNC: 2.2 MG/DL — LOW (ref 2.5–4.5)
PLATELET # BLD AUTO: 233 K/UL — SIGNIFICANT CHANGE UP (ref 150–400)
POTASSIUM SERPL-MCNC: 4.4 MMOL/L — SIGNIFICANT CHANGE UP (ref 3.5–5.3)
POTASSIUM SERPL-SCNC: 4.4 MMOL/L — SIGNIFICANT CHANGE UP (ref 3.5–5.3)
PROT SERPL-MCNC: 7.4 G/DL — SIGNIFICANT CHANGE UP (ref 6–8.3)
PROT UR-MCNC: NEGATIVE MG/DL — SIGNIFICANT CHANGE UP
RBC # BLD: 4.09 M/UL — SIGNIFICANT CHANGE UP (ref 3.8–5.2)
RBC # FLD: 14 % — SIGNIFICANT CHANGE UP (ref 10.3–14.5)
RBC CASTS # UR COMP ASSIST: 1 /HPF — SIGNIFICANT CHANGE UP (ref 0–4)
RSV RNA NPH QL NAA+NON-PROBE: SIGNIFICANT CHANGE UP
SARS-COV-2 RNA SPEC QL NAA+PROBE: SIGNIFICANT CHANGE UP
SODIUM SERPL-SCNC: 142 MMOL/L — SIGNIFICANT CHANGE UP (ref 135–145)
SP GR SPEC: 1.01 — SIGNIFICANT CHANGE UP (ref 1–1.03)
SQUAMOUS # UR AUTO: 1 /HPF — SIGNIFICANT CHANGE UP (ref 0–5)
UROBILINOGEN FLD QL: 0.2 MG/DL — SIGNIFICANT CHANGE UP (ref 0.2–1)
WBC # BLD: 11.65 K/UL — HIGH (ref 3.8–10.5)
WBC # FLD AUTO: 11.65 K/UL — HIGH (ref 3.8–10.5)
WBC UR QL: 18 /HPF — HIGH (ref 0–5)

## 2024-09-19 PROCEDURE — 71046 X-RAY EXAM CHEST 2 VIEWS: CPT | Mod: 26

## 2024-09-19 PROCEDURE — 70450 CT HEAD/BRAIN W/O DYE: CPT | Mod: 26,MC

## 2024-09-19 PROCEDURE — 99285 EMERGENCY DEPT VISIT HI MDM: CPT | Mod: GC

## 2024-09-19 RX ORDER — AZITHROMYCIN 250 MG/1
500 TABLET, FILM COATED ORAL ONCE
Refills: 0 | Status: COMPLETED | OUTPATIENT
Start: 2024-09-19 | End: 2024-09-19

## 2024-09-19 RX ADMIN — AZITHROMYCIN 255 MILLIGRAM(S): 250 TABLET, FILM COATED ORAL at 23:04

## 2024-09-19 RX ADMIN — Medication 1 TABLET(S): at 23:04

## 2024-09-19 NOTE — ED PROVIDER NOTE - CLINICAL SUMMARY MEDICAL DECISION MAKING FREE TEXT BOX
89-year-old female past medical history of GERD, hypertension, hyperlipidemia, depression, basal cell carcinoma, melanoma [facility] secondary to aggressive behavior.  As per triage note, patient was sitting, pushing, variably replacing staff and other patients there. patient denies agression. unable to get in contact w/ nursing home. Patient also has cough x 2 weeks. PE: midly confused, lungs CTA, abd soft, CN 2-12 groslly intact. will get CTH, UA, labs, CXR. dispo pending workup

## 2024-09-19 NOTE — ED ADULT NURSE NOTE - NSFALLHARMRISKINTERV_ED_ALL_ED
Assistance OOB with selected safe patient handling equipment if applicable/Assistance with ambulation/Communicate risk of Fall with Harm to all staff, patient, and family/Monitor gait and stability/Monitor for mental status changes and reorient to person, place, and time, as needed/Provide visual cue: red socks, yellow wristband, yellow gown, etc/Reinforce activity limits and safety measures with patient and family/Toileting schedule using arm’s reach rule for commode and bathroom/Use of alarms - bed, stretcher, chair and/or video monitoring/Bed in lowest position, wheels locked, appropriate side rails in place/Call bell, personal items and telephone in reach/Instruct patient to call for assistance before getting out of bed/chair/stretcher/Non-slip footwear applied when patient is off stretcher/[place] to call system/Physically safe environment - no spills, clutter or unnecessary equipment/Purposeful Proactive Rounding/Room/bathroom lighting operational, light cord in reach

## 2024-09-19 NOTE — ED PROVIDER NOTE - ATTENDING CONTRIBUTION TO CARE
DR. BLOCH, ATTENDING MD-  I performed a face to face bedside interview with patient regarding history of present illness, review of symptoms and past medical history. I completed an independent physical exam.  I have discussed patient's plan of care with the resident.  Patient minimally cooperative, no answering questions, heent nml heart s1s2, lungs clear abd soft, nontender, skin intact, no obvious motor deficits.

## 2024-09-19 NOTE — ED ADULT TRIAGE NOTE - NS_BH TRG QUESTION5_ED_ALL_ED
No
returns with persistent diarrhea.  Did not fill medication from pharmacy.  Appears anxious.  skin warm, HR in the low 90s.  No abdominal tenderness.  Mucous membranes moist.  Track marks on both arms but denies drug use.  No site appears infected.  Repeat labs, stool testing, fluids ordered.

## 2024-09-19 NOTE — ED ADULT TRIAGE NOTE - CHIEF COMPLAINT QUOTE
Pt arrives via EMS from the [facility] facility for aggressive behavior, has been hitting/pushing & verbally abusive to staff and other patients there. Calm in triage. Pt c/o cough x2 wks, denies other physical complaints. Hypoxia noted in triage. RR even/unlabored. 2L NC placed. PMHx: basal cell cancer, melanoma, HLD, depression.

## 2024-09-19 NOTE — ED PROVIDER NOTE - CARE PLAN
Principal Discharge DX:	AMS (altered mental status)  Secondary Diagnosis:	Acute UTI  Secondary Diagnosis:	PNA (pneumonia)   1

## 2024-09-19 NOTE — ED ADULT NURSE NOTE - CHIEF COMPLAINT QUOTE
Pt arrives via EMS from the [facility] facility for aggressive behavior, has been hitting/pushing & verbally abusive to staff and other patients there. Calm in triage. Pt c/o cough x2 wks, denies other physical complaints. Hypoxia noted in triage. RR even/unlabored. 2L NC placed. PMHx: basal cell cancer, melanoma, HLD, depression.

## 2024-09-19 NOTE — ED ADULT NURSE NOTE - OBJECTIVE STATEMENT
Pt is A&O x 2, ambulatory w/o one person assist, shows no signs of acute distress. Sent to the ED from [facility] for aggressive behavior towards staff and residents. As per NH pt has been verbally and physically abusive towards others in the facility. Pt states " I don't know why im here, Im perfectly healthy".  Currently calm and cooperative. Pt does endorse persistent cough. Noted to have rhinorrhea as well. Denies gi/gu symptoms, fevers/chills, dizziness/vision changes, SOB or chest discomfort. VSS upon arrival. Respirations even and unlabored. Left AC 20 gauge IV line placed and labs drawn. Pending lab results, CT and XR.

## 2024-09-20 ENCOUNTER — TRANSCRIPTION ENCOUNTER (OUTPATIENT)
Age: 89
End: 2024-09-20

## 2024-09-20 DIAGNOSIS — F03.90 UNSPECIFIED DEMENTIA, UNSPECIFIED SEVERITY, WITHOUT BEHAVIORAL DISTURBANCE, PSYCHOTIC DISTURBANCE, MOOD DISTURBANCE, AND ANXIETY: ICD-10-CM

## 2024-09-20 DIAGNOSIS — F32.9 MAJOR DEPRESSIVE DISORDER, SINGLE EPISODE, UNSPECIFIED: ICD-10-CM

## 2024-09-20 DIAGNOSIS — R45.1 RESTLESSNESS AND AGITATION: ICD-10-CM

## 2024-09-20 DIAGNOSIS — H40.9 UNSPECIFIED GLAUCOMA: ICD-10-CM

## 2024-09-20 DIAGNOSIS — E78.5 HYPERLIPIDEMIA, UNSPECIFIED: ICD-10-CM

## 2024-09-20 DIAGNOSIS — R82.81 PYURIA: ICD-10-CM

## 2024-09-20 DIAGNOSIS — Z79.899 OTHER LONG TERM (CURRENT) DRUG THERAPY: ICD-10-CM

## 2024-09-20 DIAGNOSIS — R05.8 OTHER SPECIFIED COUGH: ICD-10-CM

## 2024-09-20 DIAGNOSIS — Z29.9 ENCOUNTER FOR PROPHYLACTIC MEASURES, UNSPECIFIED: ICD-10-CM

## 2024-09-20 PROCEDURE — 90792 PSYCH DIAG EVAL W/MED SRVCS: CPT

## 2024-09-20 PROCEDURE — 71250 CT THORAX DX C-: CPT | Mod: 26

## 2024-09-20 PROCEDURE — 99223 1ST HOSP IP/OBS HIGH 75: CPT

## 2024-09-20 RX ORDER — ACETAMINOPHEN 325 MG
650 TABLET ORAL EVERY 6 HOURS
Refills: 0 | Status: DISCONTINUED | OUTPATIENT
Start: 2024-09-20 | End: 2024-09-26

## 2024-09-20 RX ORDER — PANTOPRAZOLE SODIUM 40 MG/1
40 TABLET, DELAYED RELEASE ORAL
Refills: 0 | Status: DISCONTINUED | OUTPATIENT
Start: 2024-09-20 | End: 2024-09-26

## 2024-09-20 RX ORDER — OLANZAPINE 2.5 MG
1.25 TABLET ORAL EVERY 8 HOURS
Refills: 0 | Status: DISCONTINUED | OUTPATIENT
Start: 2024-09-20 | End: 2024-09-26

## 2024-09-20 RX ORDER — QUETIAPINE FUMARATE 50 MG/1
12.5 TABLET, FILM COATED ORAL EVERY 8 HOURS
Refills: 0 | Status: DISCONTINUED | OUTPATIENT
Start: 2024-09-20 | End: 2024-09-26

## 2024-09-20 RX ORDER — LATANOPROST 50 UG/ML
1 SOLUTION OPHTHALMIC AT BEDTIME
Refills: 0 | Status: DISCONTINUED | OUTPATIENT
Start: 2024-09-20 | End: 2024-09-26

## 2024-09-20 RX ORDER — ACETAMINOPHEN 325 MG/1
1 TABLET ORAL
Refills: 0 | DISCHARGE

## 2024-09-20 RX ORDER — SENNOSIDES 8.6 MG
2 TABLET ORAL AT BEDTIME
Refills: 0 | Status: DISCONTINUED | OUTPATIENT
Start: 2024-09-20 | End: 2024-09-26

## 2024-09-20 RX ORDER — CRANBERRY FRUIT EXTRACT 650 MG
2000 CAPSULE ORAL DAILY
Refills: 0 | Status: DISCONTINUED | OUTPATIENT
Start: 2024-09-20 | End: 2024-09-22

## 2024-09-20 RX ORDER — ATORVASTATIN CALCIUM 10 MG/1
10 TABLET, FILM COATED ORAL AT BEDTIME
Refills: 0 | Status: DISCONTINUED | OUTPATIENT
Start: 2024-09-20 | End: 2024-09-26

## 2024-09-20 RX ORDER — MIRTAZAPINE 30 MG/1
45 TABLET, FILM COATED ORAL AT BEDTIME
Refills: 0 | Status: DISCONTINUED | OUTPATIENT
Start: 2024-09-20 | End: 2024-09-26

## 2024-09-20 RX ORDER — MEMANTINE 10 MG/1
10 TABLET ORAL
Refills: 0 | Status: DISCONTINUED | OUTPATIENT
Start: 2024-09-20 | End: 2024-09-26

## 2024-09-20 RX ORDER — SERTRALINE HYDROCHLORIDE 100 MG/1
50 TABLET, FILM COATED ORAL DAILY
Refills: 0 | Status: DISCONTINUED | OUTPATIENT
Start: 2024-09-20 | End: 2024-09-26

## 2024-09-20 RX ORDER — 5-HYDROXYTRYPTOPHAN (5-HTP) 100 MG
3 TABLET,DISINTEGRATING ORAL AT BEDTIME
Refills: 0 | Status: DISCONTINUED | OUTPATIENT
Start: 2024-09-20 | End: 2024-09-26

## 2024-09-20 RX ORDER — ONDANSETRON HCL/PF 4 MG/2 ML
4 VIAL (ML) INJECTION EVERY 8 HOURS
Refills: 0 | Status: DISCONTINUED | OUTPATIENT
Start: 2024-09-20 | End: 2024-09-26

## 2024-09-20 RX ORDER — MAG HYDROX/ALUMINUM HYD/SIMETH 200-200-20
30 SUSPENSION, ORAL (FINAL DOSE FORM) ORAL EVERY 4 HOURS
Refills: 0 | Status: DISCONTINUED | OUTPATIENT
Start: 2024-09-20 | End: 2024-09-26

## 2024-09-20 RX ADMIN — Medication 5000 UNIT(S): at 06:08

## 2024-09-20 RX ADMIN — Medication 5000 UNIT(S): at 14:36

## 2024-09-20 RX ADMIN — Medication 17 GRAM(S): at 17:19

## 2024-09-20 RX ADMIN — MIRTAZAPINE 45 MILLIGRAM(S): 30 TABLET, FILM COATED ORAL at 21:26

## 2024-09-20 RX ADMIN — PANTOPRAZOLE SODIUM 40 MILLIGRAM(S): 40 TABLET, DELAYED RELEASE ORAL at 06:09

## 2024-09-20 RX ADMIN — Medication 2 TABLET(S): at 21:25

## 2024-09-20 RX ADMIN — MEMANTINE 10 MILLIGRAM(S): 10 TABLET ORAL at 17:19

## 2024-09-20 RX ADMIN — LATANOPROST 1 DROP(S): 50 SOLUTION OPHTHALMIC at 21:25

## 2024-09-20 RX ADMIN — Medication 5000 UNIT(S): at 21:26

## 2024-09-20 RX ADMIN — Medication 2000 UNIT(S): at 14:36

## 2024-09-20 RX ADMIN — ATORVASTATIN CALCIUM 10 MILLIGRAM(S): 10 TABLET, FILM COATED ORAL at 21:26

## 2024-09-20 RX ADMIN — MEMANTINE 10 MILLIGRAM(S): 10 TABLET ORAL at 06:08

## 2024-09-20 RX ADMIN — Medication 17 GRAM(S): at 06:08

## 2024-09-20 RX ADMIN — SERTRALINE HYDROCHLORIDE 50 MILLIGRAM(S): 100 TABLET, FILM COATED ORAL at 14:36

## 2024-09-20 NOTE — DISCHARGE NOTE PROVIDER - NSDCMRMEDTOKEN_GEN_ALL_CORE_FT
acetaminophen 500 mg oral tablet: 1 tab(s) orally 3 times a day  amLODIPine 5 mg oral tablet: 1 tab(s) orally once a day  atorvastatin 10 mg oral tablet: 1 tab(s) orally once a day (at bedtime)  docusate sodium 100 mg oral capsule: 1 cap(s) orally once a day  Hydrocortisone acetate 25mg suppository rectal: 1 suppository rectally 2 times a day as needed for hemorrhoids  latanoprost 0.005% ophthalmic solution: 1 drop(s) to each affected eye once a day (in the evening)  melatonin 5 mg oral tablet: 1 tab(s) orally once a day (at bedtime)  memantine 28 mg oral capsule, extended release: 1 cap(s) orally once a day at 12pm  mirtazapine 45 mg oral tablet, disintegratin tab(s) orally once a day (at bedtime)  omeprazole 20 mg oral delayed release tablet: 1 tab(s) orally once a day before breakfast  Physical Therapy: Evaluate and treat  PreserVision AREDS 2 oral capsule: 1 cap(s) orally once a day  QUEtiapine 25 mg oral tablet: 1 tab(s) orally 2 times a day  senna leaf extract oral tablet: 2 tab(s) orally once a day (at bedtime) HOLD FOR LOOSE STOOLS  sertraline 50 mg oral tablet: 1 tab(s) orally once a day  Vitamin D3 50 mcg (2000 intl units) oral tablet: 1 tab(s) orally once a day   amLODIPine 5 mg oral tablet: 1 tab(s) orally once a day  atorvastatin 10 mg oral tablet: 1 tab(s) orally once a day (at bedtime)  BuSpar 5 mg oral tablet: 1 tab(s) orally  docusate sodium 100 mg oral capsule: 1 cap(s) orally once a day  latanoprost 0.005% ophthalmic solution: 1 drop(s) to each affected eye once a day (in the evening)  melatonin 5 mg oral tablet: 1 tab(s) orally once a day (at bedtime)  memantine 28 mg oral capsule, extended release: 1 cap(s) orally once a day at 12pm  mirtazapine 45 mg oral tablet, disintegratin tab(s) orally once a day (at bedtime)  omeprazole 20 mg oral delayed release tablet: 1 tab(s) orally once a day before breakfast  PreserVision AREDS 2 oral capsule: 1 cap(s) orally once a day  QUEtiapine 25 mg oral tablet: 1 tab(s) orally 2 times a day  senna leaf extract oral tablet: 2 tab(s) orally once a day (at bedtime) HOLD FOR LOOSE STOOLS  sertraline 50 mg oral tablet: 1 tab(s) orally once a day  Vitamin D3 50 mcg (2000 intl units) oral tablet: 1 tab(s) orally once a day   acetaminophen 325 mg oral tablet: 2 tab(s) orally every 6 hours as needed for Temp greater or equal to 38C (100.4F), Mild Pain (1 - 3)  aluminum hydroxide-magnesium hydroxide 200 mg-200 mg/5 mL oral suspension: 30 milliliter(s) orally every 4 hours as needed for Dyspepsia  amLODIPine 5 mg oral tablet: 1 tab(s) orally once a day  atorvastatin 10 mg oral tablet: 1 tab(s) orally once a day (at bedtime)  docusate sodium 100 mg oral capsule: 1 cap(s) orally once a day  guaiFENesin-dextromethorphan 100 mg-10 mg/5 mL oral liquid: 10 milliliter(s) orally every 4 hours as needed for Cough  latanoprost 0.005% ophthalmic solution: 1 drop(s) to each affected eye once a day (at bedtime)  melatonin 3 mg oral tablet: 1 tab(s) orally once a day (at bedtime) as needed for Insomnia  memantine 10 mg oral tablet: 1 tab(s) orally 2 times a day  mirtazapine 45 mg oral tablet, disintegratin tab(s) orally once a day (at bedtime)  pantoprazole 40 mg oral delayed release tablet: 1 tab(s) orally once a day (before a meal)  polyethylene glycol 3350 oral powder for reconstitution: 17 gram(s) orally 2 times a day  PreserVision AREDS 2 oral capsule: 1 cap(s) orally once a day  Seroquel 25 mg oral tablet: 0.5 tab(s) orally every 8 hours as needed for  agitation  sertraline 50 mg oral tablet: 1 tab(s) orally once a day   acetaminophen 325 mg oral tablet: 2 tab(s) orally every 6 hours as needed for Pain  aluminum hydroxide-magnesium hydroxide 200 mg-200 mg/5 mL oral suspension: 30 milliliter(s) orally every 4 hours as needed for Dyspepsia  amLODIPine 5 mg oral tablet: 1 tab(s) orally once a day  atorvastatin 10 mg oral tablet: 1 tab(s) orally once a day (at bedtime)  docusate sodium 100 mg oral capsule: 1 cap(s) orally once a day  latanoprost 0.005% ophthalmic solution: 1 drop(s) to each affected eye once a day (at bedtime)  melatonin 3 mg oral tablet: 1 tab(s) orally once a day (at bedtime) as needed for Insomnia  memantine 10 mg oral tablet: 1 tab(s) orally 2 times a day  mirtazapine 45 mg oral tablet, disintegratin tab(s) orally once a day (at bedtime)  pantoprazole 40 mg oral delayed release tablet: 1 tab(s) orally once a day (before a meal)  polyethylene glycol 3350 oral powder for reconstitution: 17 gram(s) orally 2 times a day  PreserVision AREDS 2 oral capsule: 1 cap(s) orally once a day  Seroquel 25 mg oral tablet: 0.5 tab(s) orally every 8 hours as needed for  agitation  sertraline 50 mg oral tablet: 1 tab(s) orally once a day

## 2024-09-20 NOTE — H&P ADULT - NSHPLABSRESULTS_GEN_ALL_CORE
LABS and ADDITIONAL STUDIES:                        12.7   11.65 )-----------( 233      ( 19 Sep 2024 20:25 )             38.5     142  |  108[H]  |  19  ----------------------------<  102[H]       4.4   |  22  |  1.10    Ca    11.8[H]      19 Sep 2024 20:25  Phos  2.2       Mg     2.50         TPro  7.4  /  Alb  4.0  /  TBili  0.3  /  DBili  x   /  AST  32  /  ALT  30  /  AlkPhos  103      Urinalysis Basic - ( 19 Sep 2024 20:45 )  Color: Yellow / Appearance: Clear / S.014 / pH: 7.0  Gluc: Negative mg/dL / Ketone: Negative mg/dL  / Bili: Negative / Urobili: 0.2 mg/dL   Blood: Negative / Protein: Negative mg/dL / Nitrite: Negative   Leuk Esterase: Small / RBC: 1 /HPF / WBC 18 /HPF   Sq Epi: 1 /HPF / Bacteria: Negative /HPF  Hyaline Casts: x/WBC Casts: x    Flu/COVID/RSV - neg    < from: Xray Chest 2 Views PA/Lat (24 @ 22:07) >  FINDINGS:  The heart is normal in size.  Right upper lobe opacities and left basilar haziness obscuring the hemidiaphragm similar to prior exam of 2024.  There is no new opacity to suggest pneumonia.  There is no pneumothorax or pleural effusion.  No acute bony abnormality.    IMPRESSION: No acute pulmonary disease.  --- End of Report ---  < end of copied text >    < from: CT Head No Cont (24 @ 19:55) >  FINDINGS:  The study is limited due to motion.    There is moderate diffuse parenchymal volume loss.    There are areas of low attenuation in the periventricular white matter likely related to mild chronic microvascular ischemic changes.    There is no acute intracranial hemorrhage, parenchymal mass, mass effect or midline shift. There is no acute territorial infarct. There is no hydrocephalus.    The cranium is intact. The visualized paranasal sinuses are well-aerated.    IMPRESSION:  Limited study due to motion.    No acute intracranial hemorrhage or acute territorial infarct.  --- End of Report ---  < end of copied text >    EKG - NSR at 82, QTc 406, LVH with repol (present on prior), no significant ST-T wave changes

## 2024-09-20 NOTE — DISCHARGE NOTE PROVIDER - ATTENDING DISCHARGE PHYSICAL EXAMINATION:
ICU Vital Signs Last 24 Hrs  T(C): 36.5 (26 Sep 2024 04:50), Max: 36.7 (25 Sep 2024 22:29)  T(F): 97.7 (26 Sep 2024 04:50), Max: 98.1 (25 Sep 2024 22:29)  HR: 71 (26 Sep 2024 04:50) (68 - 73)  BP: 147/74 (26 Sep 2024 04:50) (146/67 - 164/56)  BP(mean): --  ABP: --  ABP(mean): --  RR: 18 (26 Sep 2024 04:50) (18 - 18)  SpO2: 99% (26 Sep 2024 04:50) (94% - 99%). Today noted to be on 2L NC on flowsheet. Pt not on O2 at bedside and sating well on RA    T(C): 36.5 (09-26-24 @ 04:50), Max: 36.7 (09-25-24 @ 22:29)  HR: 71 (09-26-24 @ 04:50) (68 - 73)  BP: 147/74 (09-26-24 @ 04:50) (146/67 - 164/56)  RR: 18 (09-26-24 @ 04:50) (18 - 18)  SpO2: 99% (09-26-24 @ 04:50) (94% - 99%)    CONSTITUTIONAL:no apparent distress  EYES: EOMI, No conjunctival or scleral injection  ENMT: Oral mucosa with moist membranes.   NECK: Supple  RESP: No respiratory distress, no use of accessory muscles; CTA b/l,  CV: RRR, +S1S2, no peripheral edema  GI: Soft, NT, ND, no rebound, no guarding; no palpable masses;  MSK: No joint swelling   NEURO: poor short term memory. No focal deficits. Moving all extremities   PSYCH:Calm

## 2024-09-20 NOTE — H&P ADULT - NSHPPHYSICALEXAM_GEN_ALL_CORE
Vital Signs Last 24 Hrs  T(C): 37.1 (20 Sep 2024 03:07), Max: 37.2 (19 Sep 2024 14:09)  T(F): 98.7 (20 Sep 2024 03:07), Max: 99 (19 Sep 2024 14:09)  HR: 74 (20 Sep 2024 03:07) (74 - 93)  BP: 138/83 (20 Sep 2024 03:07) (136/66 - 155/74)  BP(mean): --  RR: 16 (20 Sep 2024 03:07) (16 - 18)  SpO2: 95% (20 Sep 2024 03:07) (92% - 96%)    Parameters below as of 20 Sep 2024 03:07  Patient On (Oxygen Delivery Method): nasal cannula  O2 Flow (L/min): 2    GENERAL: No acute distress, well-developed  EYES/ENT: EOMI, PERRL, conjunctiva and sclera clear, Neck supple, No JVD, moist mucosa  CHEST/LUNG: +Bibasilar crackles, no wheezing, equal breath sounds bilaterally   BACK: No spinal tenderness  HEART: Regular rate and rhythm; No murmurs, rubs, or gallops  ABDOMEN: Soft, Nontender, Nondistended; Bowel sounds present  EXTREMITIES: +DP/PT/Radial pulses b/l, No clubbing, cyanosis, or edema  PSYCH: Nl behavior, nl affect  NEUROLOGY: AAOx2 (to self and place, not to time which is baseline as per daughter), non-focal  SKIN: Normal color, No rashes or lesions

## 2024-09-20 NOTE — PROGRESS NOTE ADULT - SUBJECTIVE AND OBJECTIVE BOX
[facility] Division of Hospital Medicine  Kerline Hernandez MD  Pager (M-F, 8A-5P): [phone]  Other Times:      Patient is a 89y old  Female who presents with a chief complaint of Agitation at her living facility (20 Sep 2024 11:26)      SUBJECTIVE / OVERNIGHT EVENTS:  No acute events overnight.  Pt says she is fine and feels she is a prisoner in the hospital.  She denies discomfort and is very passionate when speaking.      MEDICATIONS  (STANDING):  atorvastatin 10 milliGRAM(s) Oral at bedtime  cholecalciferol 2000 Unit(s) Oral daily  heparin   Injectable 5000 Unit(s) SubCutaneous every 8 hours  latanoprost 0.005% Ophthalmic Solution 1 Drop(s) Both EYES at bedtime  levoFLOXacin IVPB      memantine 10 milliGRAM(s) Oral two times a day  mirtazapine Soltab 45 milliGRAM(s) Oral at bedtime  pantoprazole    Tablet 40 milliGRAM(s) Oral before breakfast  polyethylene glycol 3350 17 Gram(s) Oral two times a day  senna 2 Tablet(s) Oral at bedtime  sertraline 50 milliGRAM(s) Oral daily    MEDICATIONS  (PRN):  acetaminophen     Tablet .. 650 milliGRAM(s) Oral every 6 hours PRN Temp greater or equal to 38C (100.4F), Mild Pain (1 - 3)  aluminum hydroxide/magnesium hydroxide/simethicone Suspension 30 milliLiter(s) Oral every 4 hours PRN Dyspepsia  guaifenesin/dextromethorphan Oral Liquid 10 milliLiter(s) Oral every 4 hours PRN Cough  melatonin 3 milliGRAM(s) Oral at bedtime PRN Insomnia  ondansetron Injectable 4 milliGRAM(s) IV Push every 8 hours PRN Nausea and/or Vomiting      CAPILLARY BLOOD GLUCOSE        I&O's Summary      PHYSICAL EXAM:  Vital Signs Last 24 Hrs  T(C): 37.1 (20 Sep 2024 11:46), Max: 37.2 (19 Sep 2024 14:09)  T(F): 98.8 (20 Sep 2024 11:46), Max: 99 (19 Sep 2024 14:09)  HR: 76 (20 Sep 2024 11:46) (74 - 93)  BP: 136/52 (20 Sep 2024 11:46) (136/52 - 155/74)  BP(mean): --  RR: 17 (20 Sep 2024 11:46) (16 - 18)  SpO2: 97% (20 Sep 2024 11:46) (92% - 97%)    Parameters below as of 20 Sep 2024 11:46  Patient On (Oxygen Delivery Method): nasal cannula  O2 Flow (L/min): 2      CONSTITUTIONAL: NAD, well-developed  EYES: EOMI; conjunctiva and sclera clear  ENMT: Moist oral mucosa  RESPIRATORY: Normal respiratory effort; lungs are clear to auscultation bilaterally  CARDIOVASCULAR: Regular rate and rhythm, normal S1 and S2, no murmur; No lower extremity edema  ABDOMEN: Nontender to palpation, normoactive bowel sounds  MUSCULOSKELETAL:   no clubbing or cyanosis of digits; no joint swelling or tenderness to palpation  PSYCH: A+O to person, place, and time; affect appropriate  NEUROLOGY: CN 2-12 are intact and symmetric; no gross sensory deficits   SKIN: No rashes; no palpable lesions    LABS:                        12.7   11.65 )-----------( 233      ( 19 Sep 2024 20:25 )             38.5     -    142  |  108[H]  |  19  ----------------------------<  102[H]  4.4   |  22  |  1.10    Ca    11.8[H]      19 Sep 2024 20:25  Phos  2.2       Mg     2.50         TPro  7.4  /  Alb  4.0  /  TBili  0.3  /  DBili  x   /  AST  32  /  ALT  30  /  AlkPhos  103            Urinalysis Basic - ( 19 Sep 2024 20:45 )    Color: Yellow / Appearance: Clear / S.014 / pH: x  Gluc: x / Ketone: Negative mg/dL  / Bili: Negative / Urobili: 0.2 mg/dL   Blood: x / Protein: Negative mg/dL / Nitrite: Negative   Leuk Esterase: Small / RBC: 1 /HPF / WBC 18 /HPF   Sq Epi: x / Non Sq Epi: 1 /HPF / Bacteria: Negative /HPF        Urinalysis with Rflx Culture (collected 19 Sep 2024 20:45)      RADIOLOGY & ADDITIONAL TESTS:  Results Reviewed:   Imaging Personally Reviewed:  Electrocardiogram Personally Reviewed:    COORDINATION OF CARE:  Care Discussed with Consultants/Other Providers [Y/N]: Y  Prior or Outpatient Records Reviewed [Y/N]: Y

## 2024-09-20 NOTE — PROGRESS NOTE ADULT - PROBLEM SELECTOR PLAN 1
- Worsening agitation x months as per daughter, patient with significant agitation and aggression against staff at her snf, daughter states police also had to be called for her behavior in the past  - Daughter states patient more paranoid and agitated x1 year and has been progressively worsening, she has accused another daughter of stealing her money and no longer speaks to her   - Also was noted to have increased agitation on her first admission to [facility] earlier this month, was started on seroquel at that time - she was using at [facility] as per daughter Neda - await psych eval   - Currently at bedside patient is upset that she is in the hospital but not agitated or aggressive at present  -> c/w currently psych meds as per her recent discharge  -> Pt would benefit from psych eval - called

## 2024-09-20 NOTE — BH CONSULTATION LIAISON ASSESSMENT NOTE - SUMMARY
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 ED on 9/19 from her NH for aggressive behavior. Pt now admitted for tx of possible PNA, with CXR showing "persistent right upper lobe opacities and left basilar haziness". Psych consulted for worsening agitation.    Pt appears to be calm and cooperative throughout her interview, but is only oriented to self and showcasing worsening cognitive decline. Unclear if it is worsening from baseline, collateral from NH and daughter would be needed. Pt denies worsening mood symptoms, paranoia, AVH, SI and HI. Currently getting treatment for a possible PNA, would be beneficial to re-evaluate cognitive status after receiving antibiotics. Unclear if patient was continued on seroquel at NH after previous hospitalization.     Plan:  - c/w home meds: zoloft 50mg, remeron 45mg qhs, memantine 10mg BID  - attempt to get collateral from daughter/HCP Fadumo  - hold antipsychotics if qtc <500  - for agitation --- seroquel PO 12.5mg/zyprexa IM 1.25mg if PO is unable to be given, can give additional 1.25mg if refractory agitation  - Dispo: at this time, inpatient hospitalization is not indicated. most likely discharge back to NH. Pt is a 89yo female,  w/ 2 adult daughters, retired, domiciled at 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 Pt now admitted for tx of possible PNA.  Psych consulted for worsening agitation.    Pt appears to be calm and cooperative throughout her interview, but is only oriented to self and showcasing worsening cognitive decline. Unclear if it is worsening from baseline, collateral from NH and daughter would be needed. Pt denies worsening mood symptoms, paranoia, AVH, SI and HI. Currently getting treatment for a possible PNA, would be beneficial to re-evaluate cognitive status after receiving antibiotics. Unclear if patient was continued on seroquel at NH after previous hospitalization.     Plan:  - c/w home meds: zoloft 50mg, remeron 45mg qhs  - attempt to get collateral from daughter Fadumo  - hold antipsychotics if qtc <500  - for mild agitation --- seroquel PO 12.5mg PO q6h PRN  - For severe agitation Zyprexa IM 1.25mg q6h , can an give additional 1.25mg for refractory agitation  - Dispo: at this time, inpatient hospitalization is not indicated. most likely discharge back to NH. 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 Pt now admitted for tx of possible PNA.  Psych consulted for worsening agitation.    Pt appears to be calm and cooperative throughout her interview, but is only oriented to self and showcasing worsening cognitive decline. Unclear if it is worsening from baseline, collateral from NH and daughter would be needed. Pt denies worsening mood symptoms, paranoia, AVH, SI and HI. Currently getting treatment for a possible PNA, would be beneficial to re-evaluate cognitive status after receiving antibiotics. Unclear if patient was continued on seroquel at NH after previous hospitalization.     Plan:  - c/w home meds: zoloft 50mg, remeron 45mg qhs  - attempt to get collateral from daughter Fadumo  - hold antipsychotics if qtc >500  - for mild agitation --- seroquel PO 12.5mg PO q6h PRN  - For severe agitation Zyprexa IM 1.25mg q6h , can an give additional 1.25mg for refractory agitation  - Dispo: at this time, inpatient hospitalization is not indicated. most likely discharge back to NH. 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 Pt now admitted for tx of possible PNA.  Psych consulted for worsening agitation.    Pt appears to be calm and cooperative throughout her interview, but is only oriented to self and showcasing worsening cognitive decline. Unclear if it is worsening from baseline, collateral from NH and daughter would be needed. Pt denies worsening mood symptoms, paranoia, AVH, SI and HI. Currently getting treatment for a possible PNA, would be beneficial to re-evaluate cognitive status after receiving antibiotics. Unclear if patient was continued on seroquel at NH after previous hospitalization.     Plan:  - c/w home meds: zoloft 50mg, remeron 45mg qhs  - attempt to get collateral from daughter Fadumo  - hold antipsychotics if qtc >500  - for mild agitation --- seroquel PO 12.5mg PO q6h PRN  - For severe agitation Zyprexa IM 1.25mg q6h PRN,  can give additional 1.25mg for refractory agitation  - Dispo: at this time, inpatient hospitalization is not indicated. most likely discharge back to NH.

## 2024-09-20 NOTE — H&P ADULT - PROBLEM SELECTOR PLAN 1
- Worsening agitation x months as per daughter, patient with significant agitation and aggression against staff at her skilled nursing, daughter states police also had to be called for her behavior in the past  - Daughter states patient more paranoid and agitated x1 year  - Also was noted to have increased agitation on her first admission to [facility] earlier this month, was started on seroquel at that time  - Currently at bedside patient is upset that she is in the hospital but not agitated or aggressive at present    -> will d/c her constant obs  -> c/w currently psych meds as per her recent discharge  -> Likely psych eval in AM to help adjust her medications

## 2024-09-20 NOTE — DISCHARGE NOTE PROVIDER - HOSPITAL COURSE
HPI:  This is a 89F with HLD, Major depression, Mild Dementia, Macular degeneration/Glaucoma (pt states she is legally blind x1 year), GERD, and carotid stenosis who presents to the hospital due to agitation at her intermediate. Patient currently denies having any agitation, said that she "has been kidnapped against her will." She does report having a cough x2 weeks with sputum (no blood) and constipation (passing some gas but last BM was about 3 days ago). Denies other acute complaints.     On chart  review patient with 2 recent admissions to [facility] initially from 9/5 - 9/11 for colitis and weakness s/p abx. Was also started on seroquel for her agitation at that time and had norvasc started for her HTN. She was subsequently admitted a second time from 9/12 - 9/16 for a fall, was noted to be orthostatic and was placed on IVF with improvement and discharged back to her [facility].     Spoke with patient's daughter and HCP Neda who said that the patient has had a significant change in her behavior over the past several months. Was mostly fine until she was moved up to [place] last year, did not do well at her AVA there and was therefore moved back down. At her current AVA she has had significant issues with agitation and aggression against the staff and the daughter believes the patient needs additional sedative medications. Daughter also reports that the patient's cough has been worsening significantly over the past few weeks.     On arrival to the ED her vitals were T 99, P 93, /66, RR 18, o2 sat 92% RA (documented hypoxia to 88% on RA in ED note but unclear if this was at rest or with ambulation). Lab work showed mild leukocytosis and pyuria. Her CXR prelim was read as PNA but final read stated there was no PNA. She was given azithromycin 500mg IV x1 and bactrim DS 1T PO in ED. She was admitted to medicine.  (20 Sep 2024 09:03)    Hospital Course:  Pt was continued on levaquin while waiting for urine culture.  Spoke to daughter Neda who is concerned that pt has progressively become more paranoid and agitated over the last year.  She was evaluated by psychiatry.        Important Medication Changes and Reason:    Active or Pending Issues Requiring Follow-up:    Advanced Directives:   [ ] Full code  [ ] DNR  [ ] Hospice    Discharge Diagnoses:         HPI:  This is a 89F with HLD, Major depression, Mild Dementia, Macular degeneration/Glaucoma (pt states she is legally blind x1 year), GERD, and carotid stenosis who presents to the hospital due to agitation at her care home. Patient currently denies having any agitation, said that she "has been kidnapped against her will." She does report having a cough x2 weeks with sputum (no blood) and constipation (passing some gas but last BM was about 3 days ago). Denies other acute complaints.     On chart  review patient with 2 recent admissions to [facility] initially from 9/5 - 9/11 for colitis and weakness s/p abx. Was also started on seroquel for her agitation at that time and had norvasc started for her HTN. She was subsequently admitted a second time from 9/12 - 9/16 for a fall, was noted to be orthostatic and was placed on IVF with improvement and discharged back to her [facility].     Spoke with patient's daughter and HCP Neda who said that the patient has had a significant change in her behavior over the past several months. Was mostly fine until she was moved up to [place] last year, did not do well at her [facility] there and was therefore moved back down. At her current AVA she has had significant issues with agitation and aggression against the staff and the daughter believes the patient needs additional sedative medications. Daughter also reports that the patient's cough has been worsening significantly over the past few weeks.     On arrival to the ED her vitals were T 99, P 93, /66, RR 18, o2 sat 92% RA (documented hypoxia to 88% on RA in ED note but unclear if this was at rest or with ambulation). Lab work showed mild leukocytosis and pyuria. Her CXR prelim was read as PNA but final read stated there was no PNA. She was given azithromycin 500mg IV x1 and bactrim DS 1T PO in ED. She was admitted to medicine.  (20 Sep 2024 09:03)    Hospital Course:  89F with HLD, Major depression, Mild Dementia, Macular degeneration/Glaucoma (pt states she is legally blind x1 year), GERD, and carotid stenosis who presents from her AVA for worsening agitation and was treated for PNA given her resp symptoms.     Pt underwent CXR with showed persistent "Right upper lobe opacities and left basilar haziness" and   CT chest which showed stable interstitial lung disease. Blood cultures were obtained and were negative. She was treated with Levaquin q 48hrs given her renal clearance from (9/20- 9/25). Of noted pt UA noted pyuria however pt denied UTI complaints and therfore urine culture was not pursued. Nevertheless pt was being treated with levaquin and her respiratory symptoms improved.     In regard to her agitation, pt was followed by psychiatry. Patient was continued on  memantine, mirtazapine and sertraline w/ PRN seroquel or zyprexa for agitation. Agitation thought to be in the setting of her infection. As her infection was treated patient became calm and did not require any PRNs.     Of note her course was complicated by an episode of BRBPR particularly when she was being wiped. Pt remained stable with stable CBC. Her daughter expressed that patient has a history of Hemmorrhoids. Given pt history, she remained stable and bleeding resolved no further intervention and work up for alternative causes at the time was pursued with plan for outpatient follow up if in alignment with patients HCP/daughters goals.     Active or Pending Issues Requiring Follow-up:  1)Hypercalcemia. Pt found to have elevated calcium levels with elevated PTH more consistent with primary hyperparathyroidism. PTHrp pending. Plan for outpatient follow up and monitoring. Findings discussed with daughter. Explained that in some cases medication vs surgery (if surgical candidate ) vs monitoring are the options. Daughter/HCP Neda expressed given pts age likely would not want surgery for her.   2)CT chest showing stable interstitial lung disease .Outpt followup   3)Lower GI bleeding. Outpt follow up with GI if recurrent     Advanced Directives:   [ ] Full code  [ X] DNR  [ ] Hospice  Daughter Neda is the health care proxy and had this documentation

## 2024-09-20 NOTE — BH CONSULTATION LIAISON ASSESSMENT NOTE - CURRENT MEDICATION
MEDICATIONS  (STANDING):  atorvastatin 10 milliGRAM(s) Oral at bedtime  cholecalciferol 2000 Unit(s) Oral daily  heparin   Injectable 5000 Unit(s) SubCutaneous every 8 hours  latanoprost 0.005% Ophthalmic Solution 1 Drop(s) Both EYES at bedtime  levoFLOXacin IVPB      memantine 10 milliGRAM(s) Oral two times a day  mirtazapine Soltab 45 milliGRAM(s) Oral at bedtime  pantoprazole    Tablet 40 milliGRAM(s) Oral before breakfast  polyethylene glycol 3350 17 Gram(s) Oral two times a day  senna 2 Tablet(s) Oral at bedtime  sertraline 50 milliGRAM(s) Oral daily    MEDICATIONS  (PRN):  acetaminophen     Tablet .. 650 milliGRAM(s) Oral every 6 hours PRN Temp greater or equal to 38C (100.4F), Mild Pain (1 - 3)  aluminum hydroxide/magnesium hydroxide/simethicone Suspension 30 milliLiter(s) Oral every 4 hours PRN Dyspepsia  guaifenesin/dextromethorphan Oral Liquid 10 milliLiter(s) Oral every 4 hours PRN Cough  melatonin 3 milliGRAM(s) Oral at bedtime PRN Insomnia  OLANZapine Injectable 1.25 milliGRAM(s) IntraMuscular every 8 hours PRN agitation  ondansetron Injectable 4 milliGRAM(s) IV Push every 8 hours PRN Nausea and/or Vomiting  QUEtiapine 12.5 milliGRAM(s) Oral every 8 hours PRN agitation

## 2024-09-20 NOTE — PROGRESS NOTE ADULT - PROBLEM SELECTOR PLAN 4
- may be developed dementia with behavioral agitation  - psych eval pending  - would continue with memantine, mirtazapine and sertraline

## 2024-09-20 NOTE — DISCHARGE NOTE PROVIDER - NSDCCPCAREPLAN_GEN_ALL_CORE_FT
PRINCIPAL DISCHARGE DIAGNOSIS  Diagnosis: Agitation  Assessment and Plan of Treatment: You were admitted to the hospital for behavioral agitation thought to be due in the setting of a lung infection with your baseline dementia. You were treated with antibiotics with improvement. Medications for your dementia was also continued. You were also followed by the psychiatrist during your stay. Please follow up with a primary care doctor.         SECONDARY DISCHARGE DIAGNOSES  Diagnosis: Acute UTI  Assessment and Plan of Treatment:     Diagnosis: PNA (pneumonia)  Assessment and Plan of Treatment: You were treated for a suspected lung infection. You improved with antibiotics. Of note a cat scan of your lungs showed stable disease of your lung tissue. The findings are stable compared to prior imaging. However, this could place you at increased risk of infections. Please follow up with a primary care doctor.      Diagnosis: BRBPR (bright red blood per rectum)  Assessment and Plan of Treatment: During your stay you had an episode of blood in your stool. Given your history of hemmrroids we suspect this was the most likely cause. However they could be other causes as well. We monitored you - your remained stable, the bleeding stopped and your blood levels remained stable as well. Please monitor for any further symptoms. If you find that this is reccurrent or if you are having a signficant amount of bleeding with symptoms of light headedness, shortness of breath and or chest pain please seak emergent medical attention. Otherwise, in the absence of these emergent signs you can follow up with your primary care doctor and or a gastroeneterologist.       Diagnosis: Hypercalcemia  Assessment and Plan of Treatment: You were found to have an elevated level of calcium in your blood work. Based on the work up that we did here we think it is due to a condition called "primary hyperparathyroidism" where a hormone in your body that helps increase calcium levels is producing more than it should. Your levels were not dangeroursly high. It will be important for you to keep hydrated and drink fluids. Please follow up with your primary care doctor for further monitoring and management.         PRINCIPAL DISCHARGE DIAGNOSIS  Diagnosis: Agitation  Assessment and Plan of Treatment: You were admitted to the hospital for behavioral agitation thought to be due in the setting of a lung infection with your baseline dementia. You were treated with antibiotics with improvement. Medications for your dementia was also continued. You were also followed by the psychiatrist during your stay. Please follow up with a primary care doctor.         SECONDARY DISCHARGE DIAGNOSES  Diagnosis: Acute UTI  Assessment and Plan of Treatment:     Diagnosis: PNA (pneumonia)  Assessment and Plan of Treatment: You were treated for a suspected lung infection. You improved with antibiotics. Of note a cat scan of your lungs showed stable disease of your lung tissue. The findings are stable compared to prior imaging. However, this could place you at increased risk of infections. Please follow up with a primary care doctor.      Diagnosis: BRBPR (bright red blood per rectum)  Assessment and Plan of Treatment: During your stay you had an episode of blood in your stool. Given your history of hemmrroids we suspect this was the most likely cause. However they could be other causes as well. We monitored you - your remained stable, the bleeding stopped and your blood levels remained stable as well. Please monitor for any further symptoms. If you find that this is reccurrent or if you are having a signficant amount of bleeding with symptoms of light headedness, shortness of breath and or chest pain please seak emergent medical attention. Otherwise, in the absence of these emergent signs you can follow up with your primary care doctor and or a gastroeneterologist.       Diagnosis: Hypercalcemia  Assessment and Plan of Treatment: You were found to have an elevated level of calcium in your blood work. Based on the work up that we did here we think it is due to a condition called "primary hyperparathyroidism" where a hormone in your body that helps increase calcium levels is producing more than it should. Your levels were not dangeroursly high. It will be important for you to keep hydrated and drink fluids. Also your vitamin D was held for now as it can raise calcium levels. Please follow up with your primary care doctor for further monitoring and management.

## 2024-09-20 NOTE — H&P ADULT - PROBLEM SELECTOR PLAN 2
- Worsening productive cough x weeks as per patient and daughter, +sputum but no hemotysis  - No SOB, no fevers/chills, no URI symptoms  - CXR with persistent "Right upper lobe opacities and left basilar haziness"  - For now would opt to treat for possible PNA, [facility] paper work states patient with allergy to keflex but unclear on what the allergy is, has been on ciprofloxacin before so would place patient on levofloxacin (750mg q48 based on her renal clearance)  - Check procalcitonin  - Check CT Chest to evaluate these persistent opacities and worsening cough further  - Check BCx x2

## 2024-09-20 NOTE — BH CONSULTATION LIAISON ASSESSMENT NOTE - NSBHATTESTCOMMENTATTENDFT_PSY_A_CORE
Chart reviewed, pt. seen/evaluated separately, I agree with above assessment/plan. Patient AAOX1-2, pleasantly confused/inattentive, at times confabulating, denies si and hi. Plan as above, will follow

## 2024-09-20 NOTE — H&P ADULT - PROBLEM SELECTOR PLAN 8
DVT ppx - HSQ  Diet - No known issues with dysphagia as per patient and daughter -> place on regular diet  Activity - OOB with assistance, increase as tolerated    Fall and aspiration precautions

## 2024-09-20 NOTE — DISCHARGE NOTE PROVIDER - NSDCFUADDAPPT_GEN_ALL_CORE_FT
9/20- as per  from Assisted Living Patient was outreached, however they advised they were readmitted to the hospital yesterday   Please follow up with your primary care doctor for your scheduled appointment on 11/11/2024.

## 2024-09-20 NOTE — BH CONSULTATION LIAISON ASSESSMENT NOTE - DESCRIPTION
Pt comes from [facility]. Has two daughters, Fadumo and Neda, with Neda being her HCP. Pt is . Denies tobacco, alcohol and recreational drug use.

## 2024-09-20 NOTE — ED ADULT NURSE REASSESSMENT NOTE - NS ED NURSE REASSESS COMMENT FT1
break coverage rn. received report from DUARTE chakraborty. pt resting at this time. receiving IV antibiotics. PCA at bedside for 1:1 within arms length of patient safety maintained.

## 2024-09-20 NOTE — H&P ADULT - NSHPREVIEWOFSYSTEMS_GEN_ALL_CORE
REVIEW OF SYSTEMS:    CONSTITUTIONAL: No weakness, fevers or chills  EYES: No visual changes or eye discharge  ENT: No rhinorrhea or sore throat  NECK: No pain or stiffness  RESPIRATORY: +Productive cough, No wheezing, No hemoptysis; No shortness of breath  CARDIOVASCULAR: No chest pain or palpitations; No lower extremity edema  GASTROINTESTINAL: No abdominal or epigastric pain. No nausea, vomiting, or hematemesis; No diarrhea, +constipation. No melena or hematochezia.  BACK: No back pain  GENITOURINARY: No dysuria, frequency or hematuria  NEUROLOGICAL: No numbness or weakness  SKIN: No itching, burning, rashes, or lesions

## 2024-09-20 NOTE — BH CONSULTATION LIAISON ASSESSMENT NOTE - NSBHCHARTREVIEWLAB_PSY_A_CORE FT
09-19    142  |  108[H]  |  19  ----------------------------<  102[H]  4.4   |  22  |  1.10    Ca    11.8[H]      19 Sep 2024 20:25  Phos  2.2     09-19  Mg     2.50     09-19    TPro  7.4  /  Alb  4.0  /  TBili  0.3  /  DBili  x   /  AST  32  /  ALT  30  /  AlkPhos  103  09-19

## 2024-09-20 NOTE — DISCHARGE NOTE PROVIDER - NSDCCPTREATMENT_GEN_ALL_CORE_FT
PRINCIPAL PROCEDURE  Procedure: CT scan chest, w/o contrast  Findings and Treatment:   ACC: [id number] EXAM:  CT CHEST   ORDERED BY: BAKARI GALLOWAY   PROCEDURE DATE:  09/20/2024    INTERPRETATION:  CLINICAL INFORMATION: Worsening cough.  TECHNIQUE:  A volumetric CT acquisition of the chest was obtained from   the thoracic inlet to the upper abdomen, without the administration  of   intravenous contrast. Coronal and sagittal multiplanar reformations were   also submitted.  Comparison: Chest CT 1/11/2022  FINDINGS:  Lungs/Airways/Pleura: The central airways are patent. Peripheral   predominant reticulations involving all 5 lobes. Lower lobe predominant   bronchiectasis. These findings are similar when compared to 1/11/2022. No   new consolidation.  Mediastinum/Lymph nodes: No thoracic adenopathy.  Heart and Vessels: The heart is normal in size. Calcifications of the   aorta and coronary arteries. No pericardial effusion.  Upper Abdomen: Unremarkable.  Osseous structures and Soft Tissues: Degenerative changes.

## 2024-09-20 NOTE — H&P ADULT - NSHPSOCIALHISTORY_GEN_ALL_CORE
Lives in senior care at [facility]   Ind ambulator  Remote past tobacco use, quit at age 21  Rare EtOH  Denies illicit substance use

## 2024-09-20 NOTE — BH CONSULTATION LIAISON ASSESSMENT NOTE - HPI (INCLUDE ILLNESS QUALITY, SEVERITY, DURATION, TIMING, CONTEXT, MODIFYING FACTORS, ASSOCIATED SIGNS AND SYMPTOMS)
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 Pt now admitted for tx of possible PNA, with CXR showing "persistent right upper lobe opacities and left basilar haziness". Psych consulted for worsening agitation.    Per chart review, pt was seen by the CL service at [facility] where she was hospitalized on 9/6 after she presented with weakness and difficulty ambulating. Psych was consulted then for severe agitation, screaming and hitting. According to the treating provider, pt's daughter had shared that the agitation was worsening over the past few months with aggression towards the staff at her [facility]. Police were reported to have been called for her behavior in the past. Daughter also stated that the patient has become more paranoid over the past year, accusing her other daughter of stealing money from her. The pt was started on 50mg po qhs during this visit, unclear if continued at NH.    Today, pt seen at bedside. She is alert and superficially cooperative with the interviewer. States "I don't know why I'm here" and that she is in "[facility]" because she was found on the street and brought to the hospital against her will. When asked about why she thinks she may be in a hospital, patient expresses frustration and that "no one cares about her". Does not believe she is medically ill. Reports that she is "highly educated" and received an accounting degree from [employer]. When asked about mood, patient scoffs and states "well, I'm here". Does not share more about how she is feeling. Pt continues perseverative on being educated and having a "good brain" throughout the interview. Pt shares that her , Felix, is a doctor and tells interviewer to look him up. Pt is oriented to self only and responds "I don't know" to the remaining questions. Shares with the team that she has two daughters, Fadumo and Neda, who "probably don't even know that I'm here". Believes she has been in the hospital for several days. Unable to recall where she came from before coming to the hospital, or who brought her here. Vehemently denies mood symptoms, paranoia, AVH, SI and HI, or having past psychiatric history. Pt gave consent to the team to call her daughter, stating "I would love for you to call them".    Daughter Fadumo ([phone]) was attempted to be contacted this afternoon, with no answer.  Pt is a 89yo female,  w/ 2 adult daughters, retired, domiciled at [facility], PPH depression on Zoloft/Remeron and remote h/o therapy (unclear if currently in psych tx, meds prescribed at NH?), no known psych admissions/suicidality/homicidality/substance/legal hx, PMH dementia on donepezil/memantine, GERD, HLD, carotid stenosis who presented to the [facility] ED on 9/19 from her NH for aggressive behavior. Pt now admitted for tx of possible PNA,  Psych consulted for worsening agitation.    Per chart review, pt was seen by the CL service at [facility] in 9/2024.   Today, pt seen at bedside. She is alert and superficially cooperative with the interviewer. States "I don't know why I'm here" and that she is in "group home" because she was found on the street and brought to the hospital against her will. When asked about why she thinks she may be in a hospital, patient expresses frustration and that "no one cares about her". Does not believe she is medically ill. Reports that she is "highly educated" and received an accounting degree from [employer]. When asked about mood, patient scoffs and states "well, I'm here". Does not share more about how she is feeling. Pt continues perseverative on being educated and having a "good brain" throughout the interview. Pt shares that her , Felix, is a doctor and tells interviewer to look him up. Pt is oriented to self only and responds "I don't know" to the remaining questions. Shares with the team that she has two daughters, Fadumo and Neda, who "probably don't even know that I'm here". Believes she has been in the hospital for several days. Unable to recall where she came from before coming to the hospital, or who brought her here. Vehemently denies mood symptoms, paranoia, AVH, SI and HI, or having past psychiatric history. Pt gave consent to the team to call her daughter, stating "I would love for you to call them".    Daughter Fadumo ([phone]) was attempted to be contacted this afternoon, with no answer.

## 2024-09-20 NOTE — H&P ADULT - HISTORY OF PRESENT ILLNESS
This is a 89F with HLD, Major depression, Mild Dementia, Macular degeneration/Glaucoma (pt states she is legally blind x1 year), GERD, and carotid stenosis who presents to the hospital due to agitation at her [facility]. Patient currently denies having any agitation, said that she "has been kidnapped against her will." She does report having a cough x2 weeks with sputum (no blood) and constipation (passing some gas but last BM was about 3 days ago). Denies other acute complaints. On chart  review patient with 2 recent admissions to [facility] (initially from 9/5 - 9/11 for colitis and weakness s/p abx, second from 9/12 - 9/16 This is a 89F with HLD, Major depression, Mild Dementia, Macular degeneration/Glaucoma (pt states she is legally blind x1 year), GERD, and carotid stenosis who presents to the hospital due to agitation at her [facility]. Patient currently denies having any agitation, said that she "has been kidnapped against her will." She does report having a cough x2 weeks with sputum (no blood) and constipation (passing some gas but last BM was about 3 days ago). Denies other acute complaints.     On chart  review patient with 2 recent admissions to [facility] initially from 9/5 - 9/11 for colitis and weakness s/p abx. Was also started on seroquel for her agitation at that time and had norvasc started for her HTN. She was subsequently admitted a second time from 9/12 - 9/16 for a fall, was noted to be orthostatic and was placed on IVF with improvement and discharged back to her [facility].     Spoke with patient's daughter and HCP Neda who said that the patient has had a significant change in her behavior over the past several months. Was mostly fine until she was moved up to [place] last year, did not do well at her [facility] there and was therefore moved back down. At her current [facility] she has had significant issues with agitation and aggression against the staff and the daughter believes the patient needs additional sedative medications. Daughter also reports that the patient's cough has been worsening significantly over the past few weeks.     On arrival to the ED her vitals were T 99, P 93, /66, RR 18, o2 sat 92% RA (documented hypoxia to 88% on RA in ED note but unclear if this was at rest or with ambulation). Lab work showed mild leukocytosis and pyuria. Her CXR prelim was read as PNA but final read stated there was no PNA. She was given azithromycin 500mg IV x1 and bactrim DS 1T PO in ED. She was admitted to medicine.

## 2024-09-20 NOTE — PROGRESS NOTE ADULT - ASSESSMENT
This is a 89F with history as above who is sent in from her [facility] for worsening agitation here found to have a worsening cough.

## 2024-09-20 NOTE — H&P ADULT - ASSESSMENT
This is a 89F with history as above who is sent in from her [facility] for worsening agitation here found to have a worsening cough.

## 2024-09-20 NOTE — BH CONSULTATION LIAISON ASSESSMENT NOTE - NSBHCHARTREVIEWVS_PSY_A_CORE FT
Vital Signs Last 24 Hrs  T(C): 37.1 (20 Sep 2024 11:46), Max: 37.1 (19 Sep 2024 22:37)  T(F): 98.8 (20 Sep 2024 11:46), Max: 98.8 (20 Sep 2024 11:46)  HR: 76 (20 Sep 2024 11:46) (74 - 84)  BP: 136/52 (20 Sep 2024 11:46) (136/52 - 155/74)  BP(mean): --  RR: 17 (20 Sep 2024 11:46) (16 - 18)  SpO2: 97% (20 Sep 2024 11:46) (94% - 97%)    Parameters below as of 20 Sep 2024 11:46  Patient On (Oxygen Delivery Method): nasal cannula  O2 Flow (L/min): 2

## 2024-09-20 NOTE — PATIENT PROFILE ADULT - FALL HARM RISK - HARM RISK INTERVENTIONS
Assistance with ambulation/Assistance OOB with selected safe patient handling equipment/Communicate Risk of Fall with Harm to all staff/Monitor for mental status changes/Monitor gait and stability/Reinforce activity limits and safety measures with patient and family/Tailored Fall Risk Interventions/Visual Cue: Yellow wristband and red socks/Bed in lowest position, wheels locked, appropriate side rails in place/Call bell, personal items and telephone in reach/Instruct patient to call for assistance before getting out of bed or chair/Non-slip footwear when patient is out of bed/[place] to call system/Physically safe environment - no spills, clutter or unnecessary equipment/Purposeful Proactive Rounding/Room/bathroom lighting operational, light cord in reach

## 2024-09-20 NOTE — ED ADULT NURSE REASSESSMENT NOTE - NS ED NURSE REASSESS COMMENT FT1
break coverage rn. report given to ESS[facility] 2 RN Ramirez. Pt resting comfortably at this time. PCA iline to remain with patient

## 2024-09-20 NOTE — PROGRESS NOTE ADULT - PROBLEM SELECTOR PLAN 2
- Worsening productive cough x weeks as per patient and daughter, +sputum but no hemotysis  - No SOB, no fevers/chills, no URI symptoms  - CXR with persistent "Right upper lobe opacities and left basilar haziness"  - For now would opt to treat for possible PNA, [facility] paper work states patient with allergy to keflex but unclear on what the allergy is, has been on ciprofloxacin before so would place patient on levofloxacin (750mg q48 based on her renal clearance)  - Check CT Chest to evaluate these persistent opacities and worsening cough further  - BCx x2 - neg to date

## 2024-09-20 NOTE — PHARMACOTHERAPY INTERVENTION NOTE - COMMENTS
Medication history is complete. Medication list updated in Outpatient Medication Record (OMR). Please call spectra [phone] if you have any questions.

## 2024-09-21 DIAGNOSIS — N28.9 DISORDER OF KIDNEY AND URETER, UNSPECIFIED: ICD-10-CM

## 2024-09-21 LAB
ADD ON TEST-SPECIMEN IN LAB: SIGNIFICANT CHANGE UP
ALBUMIN SERPL ELPH-MCNC: 3.7 G/DL — SIGNIFICANT CHANGE UP (ref 3.3–5)
ALP SERPL-CCNC: 99 U/L — SIGNIFICANT CHANGE UP (ref 40–120)
ALT FLD-CCNC: 22 U/L — SIGNIFICANT CHANGE UP (ref 4–33)
ANION GAP SERPL CALC-SCNC: 10 MMOL/L — SIGNIFICANT CHANGE UP (ref 7–14)
AST SERPL-CCNC: 23 U/L — SIGNIFICANT CHANGE UP (ref 4–32)
BASOPHILS # BLD AUTO: 0.1 K/UL — SIGNIFICANT CHANGE UP (ref 0–0.2)
BASOPHILS NFR BLD AUTO: 1.1 % — SIGNIFICANT CHANGE UP (ref 0–2)
BILIRUB SERPL-MCNC: 0.4 MG/DL — SIGNIFICANT CHANGE UP (ref 0.2–1.2)
BUN SERPL-MCNC: 19 MG/DL — SIGNIFICANT CHANGE UP (ref 7–23)
CALCIUM SERPL-MCNC: 11.7 MG/DL — HIGH (ref 8.4–10.5)
CHLORIDE SERPL-SCNC: 105 MMOL/L — SIGNIFICANT CHANGE UP (ref 98–107)
CO2 SERPL-SCNC: 23 MMOL/L — SIGNIFICANT CHANGE UP (ref 22–31)
CREAT SERPL-MCNC: 1.37 MG/DL — HIGH (ref 0.5–1.3)
EGFR: 35 ML/MIN/1.73M2 — LOW
EOSINOPHIL # BLD AUTO: 0.52 K/UL — HIGH (ref 0–0.5)
EOSINOPHIL NFR BLD AUTO: 5.5 % — SIGNIFICANT CHANGE UP (ref 0–6)
GLUCOSE SERPL-MCNC: 92 MG/DL — SIGNIFICANT CHANGE UP (ref 70–99)
HCT VFR BLD CALC: 38.4 % — SIGNIFICANT CHANGE UP (ref 34.5–45)
HGB BLD-MCNC: 12.4 G/DL — SIGNIFICANT CHANGE UP (ref 11.5–15.5)
IANC: 4.69 K/UL — SIGNIFICANT CHANGE UP (ref 1.8–7.4)
IMM GRANULOCYTES NFR BLD AUTO: 1.7 % — HIGH (ref 0–0.9)
LIDOCAIN IGE QN: 30 U/L — SIGNIFICANT CHANGE UP (ref 7–60)
LYMPHOCYTES # BLD AUTO: 3.13 K/UL — SIGNIFICANT CHANGE UP (ref 1–3.3)
LYMPHOCYTES # BLD AUTO: 33.2 % — SIGNIFICANT CHANGE UP (ref 13–44)
MAGNESIUM SERPL-MCNC: 2.5 MG/DL — SIGNIFICANT CHANGE UP (ref 1.6–2.6)
MCHC RBC-ENTMCNC: 30.7 PG — SIGNIFICANT CHANGE UP (ref 27–34)
MCHC RBC-ENTMCNC: 32.3 GM/DL — SIGNIFICANT CHANGE UP (ref 32–36)
MCV RBC AUTO: 95 FL — SIGNIFICANT CHANGE UP (ref 80–100)
MONOCYTES # BLD AUTO: 0.83 K/UL — SIGNIFICANT CHANGE UP (ref 0–0.9)
MONOCYTES NFR BLD AUTO: 8.8 % — SIGNIFICANT CHANGE UP (ref 2–14)
NEUTROPHILS # BLD AUTO: 4.69 K/UL — SIGNIFICANT CHANGE UP (ref 1.8–7.4)
NEUTROPHILS NFR BLD AUTO: 49.7 % — SIGNIFICANT CHANGE UP (ref 43–77)
NRBC # BLD: 0 /100 WBCS — SIGNIFICANT CHANGE UP (ref 0–0)
NRBC # FLD: 0 K/UL — SIGNIFICANT CHANGE UP (ref 0–0)
PHOSPHATE SERPL-MCNC: 2.7 MG/DL — SIGNIFICANT CHANGE UP (ref 2.5–4.5)
PLATELET # BLD AUTO: 220 K/UL — SIGNIFICANT CHANGE UP (ref 150–400)
POTASSIUM SERPL-MCNC: 4.4 MMOL/L — SIGNIFICANT CHANGE UP (ref 3.5–5.3)
POTASSIUM SERPL-SCNC: 4.4 MMOL/L — SIGNIFICANT CHANGE UP (ref 3.5–5.3)
PROCALCITONIN SERPL-MCNC: 0.08 NG/ML — SIGNIFICANT CHANGE UP (ref 0.02–0.1)
PROT SERPL-MCNC: 6.9 G/DL — SIGNIFICANT CHANGE UP (ref 6–8.3)
RBC # BLD: 4.04 M/UL — SIGNIFICANT CHANGE UP (ref 3.8–5.2)
RBC # FLD: 13.9 % — SIGNIFICANT CHANGE UP (ref 10.3–14.5)
SODIUM SERPL-SCNC: 138 MMOL/L — SIGNIFICANT CHANGE UP (ref 135–145)
WBC # BLD: 9.43 K/UL — SIGNIFICANT CHANGE UP (ref 3.8–10.5)
WBC # FLD AUTO: 9.43 K/UL — SIGNIFICANT CHANGE UP (ref 3.8–10.5)

## 2024-09-21 PROCEDURE — 99232 SBSQ HOSP IP/OBS MODERATE 35: CPT

## 2024-09-21 RX ADMIN — SERTRALINE HYDROCHLORIDE 50 MILLIGRAM(S): 100 TABLET, FILM COATED ORAL at 12:13

## 2024-09-21 RX ADMIN — Medication 17 GRAM(S): at 05:53

## 2024-09-21 RX ADMIN — MEMANTINE 10 MILLIGRAM(S): 10 TABLET ORAL at 17:33

## 2024-09-21 RX ADMIN — Medication 5000 UNIT(S): at 05:53

## 2024-09-21 RX ADMIN — Medication 2 TABLET(S): at 21:42

## 2024-09-21 RX ADMIN — ATORVASTATIN CALCIUM 10 MILLIGRAM(S): 10 TABLET, FILM COATED ORAL at 21:42

## 2024-09-21 RX ADMIN — Medication 2000 UNIT(S): at 12:13

## 2024-09-21 RX ADMIN — MIRTAZAPINE 45 MILLIGRAM(S): 30 TABLET, FILM COATED ORAL at 21:42

## 2024-09-21 RX ADMIN — Medication 10 MILLILITER(S): at 12:12

## 2024-09-21 RX ADMIN — PANTOPRAZOLE SODIUM 40 MILLIGRAM(S): 40 TABLET, DELAYED RELEASE ORAL at 05:53

## 2024-09-21 RX ADMIN — MEMANTINE 10 MILLIGRAM(S): 10 TABLET ORAL at 05:53

## 2024-09-21 RX ADMIN — Medication 5000 UNIT(S): at 21:42

## 2024-09-21 RX ADMIN — Medication 5000 UNIT(S): at 12:14

## 2024-09-21 RX ADMIN — Medication 17 GRAM(S): at 17:33

## 2024-09-21 RX ADMIN — LATANOPROST 1 DROP(S): 50 SOLUTION OPHTHALMIC at 21:46

## 2024-09-21 NOTE — PROGRESS NOTE ADULT - SUBJECTIVE AND OBJECTIVE BOX
[facility] Division of Hospital Medicine  Kerline Hernandez MD  Pager (M-F, 8A-5P): [phone]  Other Times:  [phone]    Patient is a 89y old  Female who presents with a chief complaint of Agitation at her living facility (20 Sep 2024 12:41)      SUBJECTIVE / OVERNIGHT EVENTS:  No acute events overnight.  Continues to be upset that she is being imprisoned against her will.      MEDICATIONS  (STANDING):  atorvastatin 10 milliGRAM(s) Oral at bedtime  cholecalciferol 2000 Unit(s) Oral daily  heparin   Injectable 5000 Unit(s) SubCutaneous every 8 hours  latanoprost 0.005% Ophthalmic Solution 1 Drop(s) Both EYES at bedtime  levoFLOXacin IVPB      memantine 10 milliGRAM(s) Oral two times a day  mirtazapine Soltab 45 milliGRAM(s) Oral at bedtime  pantoprazole    Tablet 40 milliGRAM(s) Oral before breakfast  polyethylene glycol 3350 17 Gram(s) Oral two times a day  senna 2 Tablet(s) Oral at bedtime  sertraline 50 milliGRAM(s) Oral daily    MEDICATIONS  (PRN):  acetaminophen     Tablet .. 650 milliGRAM(s) Oral every 6 hours PRN Temp greater or equal to 38C (100.4F), Mild Pain (1 - 3)  aluminum hydroxide/magnesium hydroxide/simethicone Suspension 30 milliLiter(s) Oral every 4 hours PRN Dyspepsia  guaifenesin/dextromethorphan Oral Liquid 10 milliLiter(s) Oral every 4 hours PRN Cough  melatonin 3 milliGRAM(s) Oral at bedtime PRN Insomnia  OLANZapine Injectable 1.25 milliGRAM(s) IntraMuscular every 8 hours PRN agitation if unable to give quetiapine  ondansetron Injectable 4 milliGRAM(s) IV Push every 8 hours PRN Nausea and/or Vomiting  QUEtiapine 12.5 milliGRAM(s) Oral every 8 hours PRN agitation      CAPILLARY BLOOD GLUCOSE        I&O's Summary      PHYSICAL EXAM:  Vital Signs Last 24 Hrs  T(C): 37.1 (21 Sep 2024 05:41), Max: 37.1 (20 Sep 2024 11:46)  T(F): 98.7 (21 Sep 2024 05:41), Max: 98.8 (20 Sep 2024 11:46)  HR: 87 (21 Sep 2024 05:41) (71 - 87)  BP: 149/48 (21 Sep 2024 05:41) (136/52 - 149/48)  BP(mean): 73 (21 Sep 2024 05:41) (73 - 73)  RR: 17 (21 Sep 2024 05:41) (17 - 18)  SpO2: 91% (21 Sep 2024 05:41) (91% - 97%)    Parameters below as of 21 Sep 2024 05:41  Patient On (Oxygen Delivery Method): room air    CONSTITUTIONAL: NAD, well-developed  EYES: EOMI; conjunctiva and sclera clear  ENMT: Moist oral mucosa  RESPIRATORY: Normal respiratory effort; lungs are clear to auscultation bilaterally  CARDIOVASCULAR: Regular rate and rhythm, normal S1 and S2, no murmur; No lower extremity edema  ABDOMEN: Nontender to palpation, normoactive bowel sounds  MUSCULOSKELETAL:   no clubbing or cyanosis of digits; no joint swelling or tenderness to palpation  PSYCH: A+O to person, place; affect appropriate  NEUROLOGY: CN 2-12 are intact and symmetric; no gross sensory deficits   SKIN: No rashes; no palpable lesions    LABS:                        12.4   9.43  )-----------( 220      ( 21 Sep 2024 06:14 )             38.4     09-21    138  |  105  |  19  ----------------------------<  92  4.4   |  23  |  1.37[H]    Ca    11.7[H]      21 Sep 2024 06:14  Phos  2.7     09-21  Mg     2.50     09-21    TPro  6.9  /  Alb  3.7  /  TBili  0.4  /  DBili  x   /  AST  23  /  ALT  22  /  AlkPhos  99  09-21          Urinalysis Basic - ( 21 Sep 2024 06:14 )    Color: x / Appearance: x / SG: x / pH: x  Gluc: 92 mg/dL / Ketone: x  / Bili: x / Urobili: x   Blood: x / Protein: x / Nitrite: x   Leuk Esterase: x / RBC: x / WBC x   Sq Epi: x / Non Sq Epi: x / Bacteria: x        Urinalysis with Rflx Culture (collected 19 Sep 2024 20:45)      RADIOLOGY & ADDITIONAL TESTS:  Results Reviewed:   Imaging Personally Reviewed:  Electrocardiogram Personally Reviewed:    COORDINATION OF CARE:  Care Discussed with Consultants/Other Providers [Y/N]: Y  Prior or Outpatient Records Reviewed [Y/N]: Y

## 2024-09-21 NOTE — PROGRESS NOTE ADULT - ASSESSMENT
This is a 89F with history as above who is sent in from her [facility] for worsening agitation here found to have a worsening cough.

## 2024-09-21 NOTE — PROGRESS NOTE ADULT - PROBLEM SELECTOR PLAN 2
- Worsening productive cough x weeks as per patient and daughter, +sputum but no hemotysis  - No SOB, no fevers/chills, no URI symptoms  - CXR with persistent "Right upper lobe opacities and left basilar haziness"  - For now would opt to treat for possible PNA, [facility] paper work states patient with allergy to keflex but unclear on what the allergy is, has been on ciprofloxacin before so would place patient on levofloxacin (750mg q48 based on her renal clearance)  - BCx x2 - neg to date  - CT chest reviewed   - would start incentive spirometry and chest PT also

## 2024-09-21 NOTE — PROGRESS NOTE ADULT - PROBLEM SELECTOR PLAN 1
- Worsening agitation x months as per daughter, patient with significant agitation and aggression against staff at her [facility], daughter states police also had to be called for her behavior in the past  - Daughter states patient more paranoid and agitated x1 year and has been progressively worsening, she has accused another daughter of stealing her money and no longer speaks to her   - Also was noted to have increased agitation on her first admission to [facility] earlier this month, was started on seroquel at that time but unclear if she continued to receive at the [facility];  she was using at [facility] as per daughter Neda  - appreciate psych recs   - continue with zoloft 50 mg and remeron 45 mg qhs; seroquel 12.5 q6 hr prn and zyprexa 1.25 mg q6 prn for agitation

## 2024-09-21 NOTE — PROGRESS NOTE ADULT - PROBLEM SELECTOR PLAN 3
- slight rise in Cr - pt urinating without issues, no retention   - encourage po hydration  - would monitor bmp in am

## 2024-09-22 DIAGNOSIS — E83.52 HYPERCALCEMIA: ICD-10-CM

## 2024-09-22 LAB
ADD ON TEST-SPECIMEN IN LAB: SIGNIFICANT CHANGE UP
ALBUMIN SERPL ELPH-MCNC: 3.6 G/DL — SIGNIFICANT CHANGE UP (ref 3.3–5)
ANION GAP SERPL CALC-SCNC: 12 MMOL/L — SIGNIFICANT CHANGE UP (ref 7–14)
BUN SERPL-MCNC: 23 MG/DL — SIGNIFICANT CHANGE UP (ref 7–23)
CALCIUM SERPL-MCNC: 11.6 MG/DL — HIGH (ref 8.4–10.5)
CHLORIDE SERPL-SCNC: 105 MMOL/L — SIGNIFICANT CHANGE UP (ref 98–107)
CO2 SERPL-SCNC: 22 MMOL/L — SIGNIFICANT CHANGE UP (ref 22–31)
CREAT SERPL-MCNC: 1.24 MG/DL — SIGNIFICANT CHANGE UP (ref 0.5–1.3)
EGFR: 40 ML/MIN/1.73M2 — LOW
GLUCOSE SERPL-MCNC: 96 MG/DL — SIGNIFICANT CHANGE UP (ref 70–99)
POTASSIUM SERPL-MCNC: 4.5 MMOL/L — SIGNIFICANT CHANGE UP (ref 3.5–5.3)
POTASSIUM SERPL-SCNC: 4.5 MMOL/L — SIGNIFICANT CHANGE UP (ref 3.5–5.3)
SODIUM SERPL-SCNC: 139 MMOL/L — SIGNIFICANT CHANGE UP (ref 135–145)

## 2024-09-22 PROCEDURE — 99233 SBSQ HOSP IP/OBS HIGH 50: CPT

## 2024-09-22 RX ORDER — SODIUM CHLORIDE 0.9 % (FLUSH) 0.9 %
1000 SYRINGE (ML) INJECTION
Refills: 0 | Status: DISCONTINUED | OUTPATIENT
Start: 2024-09-22 | End: 2024-09-22

## 2024-09-22 RX ORDER — SODIUM CHLORIDE 0.9 % (FLUSH) 0.9 %
1000 SYRINGE (ML) INJECTION
Refills: 0 | Status: DISCONTINUED | OUTPATIENT
Start: 2024-09-22 | End: 2024-09-26

## 2024-09-22 RX ADMIN — Medication 5000 UNIT(S): at 05:41

## 2024-09-22 RX ADMIN — Medication 2 TABLET(S): at 21:00

## 2024-09-22 RX ADMIN — Medication 5000 UNIT(S): at 12:59

## 2024-09-22 RX ADMIN — Medication 50 MILLILITER(S): at 13:00

## 2024-09-22 RX ADMIN — Medication 17 GRAM(S): at 05:41

## 2024-09-22 RX ADMIN — Medication 5000 UNIT(S): at 21:00

## 2024-09-22 RX ADMIN — MEMANTINE 10 MILLIGRAM(S): 10 TABLET ORAL at 05:41

## 2024-09-22 RX ADMIN — Medication 3 MILLIGRAM(S): at 21:00

## 2024-09-22 RX ADMIN — PANTOPRAZOLE SODIUM 40 MILLIGRAM(S): 40 TABLET, DELAYED RELEASE ORAL at 05:41

## 2024-09-22 RX ADMIN — ATORVASTATIN CALCIUM 10 MILLIGRAM(S): 10 TABLET, FILM COATED ORAL at 21:00

## 2024-09-22 RX ADMIN — Medication 75 MILLILITER(S): at 09:18

## 2024-09-22 RX ADMIN — LATANOPROST 1 DROP(S): 50 SOLUTION OPHTHALMIC at 21:00

## 2024-09-22 RX ADMIN — SERTRALINE HYDROCHLORIDE 50 MILLIGRAM(S): 100 TABLET, FILM COATED ORAL at 12:59

## 2024-09-22 RX ADMIN — MEMANTINE 10 MILLIGRAM(S): 10 TABLET ORAL at 18:13

## 2024-09-22 RX ADMIN — Medication 17 GRAM(S): at 18:13

## 2024-09-22 RX ADMIN — MIRTAZAPINE 45 MILLIGRAM(S): 30 TABLET, FILM COATED ORAL at 21:00

## 2024-09-22 NOTE — PROGRESS NOTE ADULT - PROBLEM SELECTOR PLAN 3
- Worsening productive cough x weeks as per patient and daughter, +sputum but no hemotysis  - No SOB, no fevers/chills, no URI symptoms  - CXR with persistent "Right upper lobe opacities and left basilar haziness"  - For now would opt to treat for possible PNA, [facility] paper work states patient with allergy to keflex but unclear on what the allergy is, has been on ciprofloxacin before so would place patient on levofloxacin (750mg q48 based on her renal clearance)  - BCx x2 - neg to date  - CT chest reviewed - stable interstitial lung disease - would benefit from outpt followup   - would start incentive spirometry and chest PT also

## 2024-09-22 NOTE — PROGRESS NOTE ADULT - PROBLEM SELECTOR PLAN 2
- pt with hypercalcemia on labs  - albumin wnl  - would start gentle hydration and monitor  - would d/c cholecalciferol   - check PTH, PTHrp,vit d 25, vit d 1-25, spep, upep, free light chains

## 2024-09-22 NOTE — PROGRESS NOTE ADULT - SUBJECTIVE AND OBJECTIVE BOX
[facility] Division of Hospital Medicine  Kerline Hernandez MD  Pager (M-F, 8A-5P): [phone]  Other Times:  [phone]    Patient is a 89y old  Female who presents with a chief complaint of Agitation at her living facility (21 Sep 2024 07:47)      SUBJECTIVE / OVERNIGHT EVENTS:  No acute events overnight.     MEDICATIONS  (STANDING):  atorvastatin 10 milliGRAM(s) Oral at bedtime  cholecalciferol 2000 Unit(s) Oral daily  heparin   Injectable 5000 Unit(s) SubCutaneous every 8 hours  latanoprost 0.005% Ophthalmic Solution 1 Drop(s) Both EYES at bedtime  levoFLOXacin IVPB 750 milliGRAM(s) IV Intermittent every 48 hours  levoFLOXacin IVPB      memantine 10 milliGRAM(s) Oral two times a day  mirtazapine Soltab 45 milliGRAM(s) Oral at bedtime  pantoprazole    Tablet 40 milliGRAM(s) Oral before breakfast  polyethylene glycol 3350 17 Gram(s) Oral two times a day  senna 2 Tablet(s) Oral at bedtime  sertraline 50 milliGRAM(s) Oral daily    MEDICATIONS  (PRN):  acetaminophen     Tablet .. 650 milliGRAM(s) Oral every 6 hours PRN Temp greater or equal to 38C (100.4F), Mild Pain (1 - 3)  aluminum hydroxide/magnesium hydroxide/simethicone Suspension 30 milliLiter(s) Oral every 4 hours PRN Dyspepsia  guaifenesin/dextromethorphan Oral Liquid 10 milliLiter(s) Oral every 4 hours PRN Cough  melatonin 3 milliGRAM(s) Oral at bedtime PRN Insomnia  OLANZapine Injectable 1.25 milliGRAM(s) IntraMuscular every 8 hours PRN agitation if unable to give quetiapine  ondansetron Injectable 4 milliGRAM(s) IV Push every 8 hours PRN Nausea and/or Vomiting  QUEtiapine 12.5 milliGRAM(s) Oral every 8 hours PRN agitation      CAPILLARY BLOOD GLUCOSE        I&O's Summary      PHYSICAL EXAM:  Vital Signs Last 24 Hrs  T(C): 36.4 (22 Sep 2024 06:04), Max: 36.8 (21 Sep 2024 12:07)  T(F): 97.5 (22 Sep 2024 06:04), Max: 98.3 (21 Sep 2024 12:07)  HR: 67 (22 Sep 2024 06:04) (67 - 70)  BP: 143/60 (22 Sep 2024 06:04) (137/61 - 160/63)  BP(mean): --  RR: 18 (22 Sep 2024 06:04) (17 - 18)  SpO2: 97% (22 Sep 2024 06:04) (91% - 98%)    Parameters below as of 22 Sep 2024 06:04  Patient On (Oxygen Delivery Method): room air        CONSTITUTIONAL: NAD, well-developed  EYES: EOMI; conjunctiva and sclera clear  ENMT: Moist oral mucosa  RESPIRATORY: Normal respiratory effort; lungs are clear to auscultation bilaterally  CARDIOVASCULAR: Regular rate and rhythm, normal S1 and S2, no murmur; No lower extremity edema  ABDOMEN: Nontender to palpation, normoactive bowel sounds  MUSCULOSKELETAL:   no clubbing or cyanosis of digits; no joint swelling or tenderness to palpation  PSYCH: A+O to person, place; affect appropriate  NEUROLOGY: CN 2-12 are intact and symmetric; no gross sensory deficits   SKIN: No rashes; no palpable lesions    LABS:                        12.4   9.43  )-----------( 220      ( 21 Sep 2024 06:14 )             38.4     09-22    139  |  105  |  23  ----------------------------<  96  4.5   |  22  |  1.24    Ca    11.6[H]      22 Sep 2024 03:20  Phos  2.7     09-21  Mg     2.50     09-21    TPro  6.9  /  Alb  3.7  /  TBili  0.4  /  DBili  x   /  AST  23  /  ALT  22  /  AlkPhos  99  09-21          Urinalysis Basic - ( 22 Sep 2024 03:20 )    Color: x / Appearance: x / SG: x / pH: x  Gluc: 96 mg/dL / Ketone: x  / Bili: x / Urobili: x   Blood: x / Protein: x / Nitrite: x   Leuk Esterase: x / RBC: x / WBC x   Sq Epi: x / Non Sq Epi: x / Bacteria: x        Culture - Blood (collected 20 Sep 2024 09:45)  Source: .Blood Blood-Peripheral  Preliminary Report (21 Sep 2024 13:01):    No growth at 24 hours    Culture - Blood (collected 20 Sep 2024 07:30)  Source: .Blood Blood-Peripheral  Preliminary Report (21 Sep 2024 13:01):    No growth at 24 hours    Urinalysis with Rflx Culture (collected 19 Sep 2024 20:45)      RADIOLOGY & ADDITIONAL TESTS:  Results Reviewed:   Imaging Personally Reviewed:  Electrocardiogram Personally Reviewed:    COORDINATION OF CARE:  Care Discussed with Consultants/Other Providers [Y/N]: Y  Prior or Outpatient Records Reviewed [Y/N]: Y

## 2024-09-22 NOTE — PROGRESS NOTE ADULT - PROBLEM SELECTOR PLAN 1
- Worsening agitation x months as per daughter, patient with significant agitation and aggression against staff at her [facility], daughter states police also had to be called for her behavior in the past  - Daughter states patient more paranoid and agitated x1 year and has been progressively worsening, she has accused another daughter of stealing her money and no longer speaks to her   - Also was noted to have increased agitation on her first admission to [facility] earlier this month, was started on seroquel at that time but unclear if she continued to receive at the [facility];  she was using at [facility] as per daughter Neda  - appreciate psych recs   - continue with zoloft 50 mg and remeron 45 mg qhs; seroquel 12.5 q6 hr prn and zyprexa 1.25 mg q6 prn for agitation

## 2024-09-22 NOTE — PROGRESS NOTE ADULT - ASSESSMENT
This is a 89F with history as above who is sent in from her intermediate for worsening agitation here found to have a worsening cough.

## 2024-09-23 LAB
24R-OH-CALCIDIOL SERPL-MCNC: 48.2 NG/ML — SIGNIFICANT CHANGE UP (ref 30–80)
ALBUMIN SERPL ELPH-MCNC: 3.3 G/DL — SIGNIFICANT CHANGE UP (ref 3.3–5)
ALP SERPL-CCNC: 90 U/L — SIGNIFICANT CHANGE UP (ref 40–120)
ALT FLD-CCNC: 14 U/L — SIGNIFICANT CHANGE UP (ref 4–33)
ANION GAP SERPL CALC-SCNC: 14 MMOL/L — SIGNIFICANT CHANGE UP (ref 7–14)
AST SERPL-CCNC: 20 U/L — SIGNIFICANT CHANGE UP (ref 4–32)
BILIRUB SERPL-MCNC: 0.3 MG/DL — SIGNIFICANT CHANGE UP (ref 0.2–1.2)
BLD GP AB SCN SERPL QL: NEGATIVE — SIGNIFICANT CHANGE UP
BUN SERPL-MCNC: 23 MG/DL — SIGNIFICANT CHANGE UP (ref 7–23)
CALCIUM SERPL-MCNC: 11.1 MG/DL — HIGH (ref 8.4–10.5)
CHLORIDE SERPL-SCNC: 107 MMOL/L — SIGNIFICANT CHANGE UP (ref 98–107)
CO2 SERPL-SCNC: 21 MMOL/L — LOW (ref 22–31)
CREAT SERPL-MCNC: 1.26 MG/DL — SIGNIFICANT CHANGE UP (ref 0.5–1.3)
EGFR: 39 ML/MIN/1.73M2 — LOW
GLUCOSE SERPL-MCNC: 82 MG/DL — SIGNIFICANT CHANGE UP (ref 70–99)
HCT VFR BLD CALC: 34.8 % — SIGNIFICANT CHANGE UP (ref 34.5–45)
HCT VFR BLD CALC: 35.7 % — SIGNIFICANT CHANGE UP (ref 34.5–45)
HGB BLD-MCNC: 11.3 G/DL — LOW (ref 11.5–15.5)
HGB BLD-MCNC: 11.7 G/DL — SIGNIFICANT CHANGE UP (ref 11.5–15.5)
KAPPA LC SER QL IFE: 3.98 MG/DL — HIGH (ref 0.33–1.94)
KAPPA/LAMBDA FREE LIGHT CHAIN RATIO, SERUM: 1.87 RATIO — HIGH (ref 0.26–1.65)
LAMBDA LC SER QL IFE: 2.13 MG/DL — SIGNIFICANT CHANGE UP (ref 0.57–2.63)
MCHC RBC-ENTMCNC: 30.7 PG — SIGNIFICANT CHANGE UP (ref 27–34)
MCHC RBC-ENTMCNC: 30.8 PG — SIGNIFICANT CHANGE UP (ref 27–34)
MCHC RBC-ENTMCNC: 32.5 GM/DL — SIGNIFICANT CHANGE UP (ref 32–36)
MCHC RBC-ENTMCNC: 32.8 GM/DL — SIGNIFICANT CHANGE UP (ref 32–36)
MCV RBC AUTO: 93.9 FL — SIGNIFICANT CHANGE UP (ref 80–100)
MCV RBC AUTO: 94.6 FL — SIGNIFICANT CHANGE UP (ref 80–100)
NRBC # BLD: 0 /100 WBCS — SIGNIFICANT CHANGE UP (ref 0–0)
NRBC # BLD: 0 /100 WBCS — SIGNIFICANT CHANGE UP (ref 0–0)
NRBC # FLD: 0 K/UL — SIGNIFICANT CHANGE UP (ref 0–0)
NRBC # FLD: 0 K/UL — SIGNIFICANT CHANGE UP (ref 0–0)
PLATELET # BLD AUTO: 207 K/UL — SIGNIFICANT CHANGE UP (ref 150–400)
PLATELET # BLD AUTO: 213 K/UL — SIGNIFICANT CHANGE UP (ref 150–400)
POTASSIUM SERPL-MCNC: 4.3 MMOL/L — SIGNIFICANT CHANGE UP (ref 3.5–5.3)
POTASSIUM SERPL-SCNC: 4.3 MMOL/L — SIGNIFICANT CHANGE UP (ref 3.5–5.3)
PROT SERPL-MCNC: 6.4 G/DL — SIGNIFICANT CHANGE UP (ref 6–8.3)
PROT SERPL-MCNC: 6.4 G/DL — SIGNIFICANT CHANGE UP (ref 6–8.3)
PTH-INTACT FLD-MCNC: 159 PG/ML — HIGH (ref 15–65)
RBC # BLD: 3.68 M/UL — LOW (ref 3.8–5.2)
RBC # BLD: 3.8 M/UL — SIGNIFICANT CHANGE UP (ref 3.8–5.2)
RBC # FLD: 13.9 % — SIGNIFICANT CHANGE UP (ref 10.3–14.5)
RBC # FLD: 14 % — SIGNIFICANT CHANGE UP (ref 10.3–14.5)
RH IG SCN BLD-IMP: POSITIVE — SIGNIFICANT CHANGE UP
SODIUM SERPL-SCNC: 142 MMOL/L — SIGNIFICANT CHANGE UP (ref 135–145)
WBC # BLD: 8.06 K/UL — SIGNIFICANT CHANGE UP (ref 3.8–10.5)
WBC # BLD: 8.3 K/UL — SIGNIFICANT CHANGE UP (ref 3.8–10.5)
WBC # FLD AUTO: 8.06 K/UL — SIGNIFICANT CHANGE UP (ref 3.8–10.5)
WBC # FLD AUTO: 8.3 K/UL — SIGNIFICANT CHANGE UP (ref 3.8–10.5)

## 2024-09-23 PROCEDURE — 99497 ADVNCD CARE PLAN 30 MIN: CPT

## 2024-09-23 PROCEDURE — 84165 PROTEIN E-PHORESIS SERUM: CPT | Mod: 26

## 2024-09-23 PROCEDURE — 99232 SBSQ HOSP IP/OBS MODERATE 35: CPT

## 2024-09-23 RX ORDER — SOAP/LANOLIN
1 BAR TOPICAL
Refills: 0 | Status: COMPLETED | OUTPATIENT
Start: 2024-09-23 | End: 2024-09-25

## 2024-09-23 RX ORDER — TUBERCULIN PURIFIED PROTEIN DERIVATIVE 5 [IU]/.1ML
5 INJECTION, SOLUTION INTRADERMAL ONCE
Refills: 0 | Status: DISCONTINUED | OUTPATIENT
Start: 2024-09-23 | End: 2024-09-26

## 2024-09-23 RX ADMIN — LATANOPROST 1 DROP(S): 50 SOLUTION OPHTHALMIC at 20:23

## 2024-09-23 RX ADMIN — Medication 50 MILLILITER(S): at 05:46

## 2024-09-23 RX ADMIN — Medication 17 GRAM(S): at 05:17

## 2024-09-23 RX ADMIN — MEMANTINE 10 MILLIGRAM(S): 10 TABLET ORAL at 05:17

## 2024-09-23 RX ADMIN — Medication 50 MILLILITER(S): at 13:14

## 2024-09-23 RX ADMIN — Medication 1 APPLICATION(S): at 18:44

## 2024-09-23 RX ADMIN — Medication 5000 UNIT(S): at 05:17

## 2024-09-23 RX ADMIN — Medication 5000 UNIT(S): at 13:14

## 2024-09-23 RX ADMIN — MIRTAZAPINE 45 MILLIGRAM(S): 30 TABLET, FILM COATED ORAL at 20:23

## 2024-09-23 RX ADMIN — Medication 17 GRAM(S): at 18:44

## 2024-09-23 RX ADMIN — PANTOPRAZOLE SODIUM 40 MILLIGRAM(S): 40 TABLET, DELAYED RELEASE ORAL at 05:17

## 2024-09-23 RX ADMIN — MEMANTINE 10 MILLIGRAM(S): 10 TABLET ORAL at 18:44

## 2024-09-23 RX ADMIN — ATORVASTATIN CALCIUM 10 MILLIGRAM(S): 10 TABLET, FILM COATED ORAL at 20:22

## 2024-09-23 RX ADMIN — SERTRALINE HYDROCHLORIDE 50 MILLIGRAM(S): 100 TABLET, FILM COATED ORAL at 13:13

## 2024-09-23 NOTE — PROGRESS NOTE ADULT - PROBLEM SELECTOR PLAN 6
- may be developed dementia with behavioral agitation  - psych eval pending  - would continue with memantine, mirtazapine and sertraline - psych evaluation appreciated   - would continue with memantine, mirtazapine and sertraline  - For mild agitation seroquel PO 12.5mg PO q6h PRN. For severe agitation Zyprexa IM 1.25mg q6h PRN,  can give additional 1.25mg for refractory agitation

## 2024-09-23 NOTE — PROGRESS NOTE ADULT - PROBLEM SELECTOR PLAN 3
- Worsening productive cough x weeks as per patient and daughter, +sputum but no hemotysis  - No SOB, no fevers/chills, no URI symptoms  - CXR with persistent "Right upper lobe opacities and left basilar haziness"  - For now would opt to treat for possible PNA, [facility] paper work states patient with allergy to keflex but unclear on what the allergy is, has been on ciprofloxacin before so would place patient on levofloxacin (750mg q48 based on her renal clearance)  - BCx x2 - neg to date  - CT chest reviewed - stable interstitial lung disease - would benefit from outpt followup   - would start incentive spirometry and chest PT also Improved. Suspect In the setting of PNA  CXR with persistent "Right upper lobe opacities and left basilar haziness"  CT chest reviewed - stable interstitial lung disease  BCx x2 - neg to date      - For now treating for possible PNA, [facility] paper work states patient with allergy to keflex but unclear on what the allergy is, has been on ciprofloxacin before so pt placed on levofloxacin (750mg q48 based on her renal clearance) with plan for 5 days of antibiotics.   - CT chest w/ stable interstitial lung disease - would benefit from outpt followup   - Incentive spirometry and chest PT also

## 2024-09-23 NOTE — PROGRESS NOTE ADULT - PROBLEM SELECTOR PLAN 9
DVT ppx - HSQ  Diet - No known issues with dysphagia as per patient and daughter -> place on regular diet  Activity - OOB with assistance, increase as tolerated    Fall and aspiration precautions DVT ppx - HSQ  Diet - No known issues with dysphagia as per patient and daughter ->Pt tolerating regular diet  Dispo: Pending placement. Will continue to discuss daily w/ SW/CM. PPD placed per SW/CM request for placement. PPD placed today 9/23/24 at approximately 345 pm. PPD process, and potential side affects discussed with patients daughter Neda/HCP who agreed to proceed.

## 2024-09-23 NOTE — PROGRESS NOTE ADULT - SUBJECTIVE AND OBJECTIVE BOX
Patient is a 89y old  Female who presents with a chief complaint of Agitation at her living facility (22 Sep 2024 07:38)      SUBJECTIVE / OVERNIGHT EVENTS:    Review of Systems:     MEDICATIONS  (STANDING):  atorvastatin 10 milliGRAM(s) Oral at bedtime  heparin   Injectable 5000 Unit(s) SubCutaneous every 8 hours  latanoprost 0.005% Ophthalmic Solution 1 Drop(s) Both EYES at bedtime  levoFLOXacin IVPB 750 milliGRAM(s) IV Intermittent every 48 hours  levoFLOXacin IVPB      memantine 10 milliGRAM(s) Oral two times a day  mirtazapine Soltab 45 milliGRAM(s) Oral at bedtime  pantoprazole    Tablet 40 milliGRAM(s) Oral before breakfast  polyethylene glycol 3350 17 Gram(s) Oral two times a day  senna 2 Tablet(s) Oral at bedtime  sertraline 50 milliGRAM(s) Oral daily  sodium chloride 0.9%. 1000 milliLiter(s) (50 mL/Hr) IV Continuous <Continuous>    MEDICATIONS  (PRN):  acetaminophen     Tablet .. 650 milliGRAM(s) Oral every 6 hours PRN Temp greater or equal to 38C (100.4F), Mild Pain (1 - 3)  aluminum hydroxide/magnesium hydroxide/simethicone Suspension 30 milliLiter(s) Oral every 4 hours PRN Dyspepsia  guaifenesin/dextromethorphan Oral Liquid 10 milliLiter(s) Oral every 4 hours PRN Cough  melatonin 3 milliGRAM(s) Oral at bedtime PRN Insomnia  OLANZapine Injectable 1.25 milliGRAM(s) IntraMuscular every 8 hours PRN agitation if unable to give quetiapine  ondansetron Injectable 4 milliGRAM(s) IV Push every 8 hours PRN Nausea and/or Vomiting  QUEtiapine 12.5 milliGRAM(s) Oral every 8 hours PRN agitation      PHYSICAL EXAM:  ICU Vital Signs Last 24 Hrs  T(C): 36.4 (23 Sep 2024 05:56), Max: 37.2 (22 Sep 2024 19:37)  T(F): 97.5 (23 Sep 2024 05:56), Max: 99 (22 Sep 2024 19:37)  HR: 67 (23 Sep 2024 05:56) (67 - 81)  BP: 153/60 (23 Sep 2024 05:56) (124/61 - 153/60)  BP(mean): --  ABP: --  ABP(mean): --  RR: 18 (23 Sep 2024 05:56) (18 - 18)  SpO2: 98% (23 Sep 2024 05:56) (91% - 98%)    O2 Parameters below as of 23 Sep 2024 05:56  Patient On (Oxygen Delivery Method): room air    CONSTITUTIONAL: NAD, well-developed  EYES: EOMI; conjunctiva and sclera clear  ENMT: Moist oral mucosa  RESPIRATORY: Normal respiratory effort; lungs are clear to auscultation bilaterally  CARDIOVASCULAR: Regular rate and rhythm, normal S1 and S2, no murmur; No lower extremity edema  ABDOMEN: Nontender to palpation, normoactive bowel sounds  MUSCULOSKELETAL:   no clubbing or cyanosis of digits; no joint swelling or tenderness to palpation  PSYCH: A+O to person, place; affect appropriate  NEUROLOGY: CN 2-12 are intact and symmetric; no gross sensory deficits   SKIN: No rashes; no palpable lesions    LABS:  CAPILLARY BLOOD GLUCOSE                              11.3   8.06  )-----------( 207      ( 23 Sep 2024 04:23 )             34.8     09-23    142  |  107  |  23  ----------------------------<  82  4.3   |  21[L]  |  1.26    Ca    11.1[H]      23 Sep 2024 04:23    TPro  6.4  /  Alb  3.3  /  TBili  0.3  /  DBili  x   /  AST  20  /  ALT  14  /  AlkPhos  90  09-23          Urinalysis Basic - ( 23 Sep 2024 04:23 )    Color: x / Appearance: x / SG: x / pH: x  Gluc: 82 mg/dL / Ketone: x  / Bili: x / Urobili: x   Blood: x / Protein: x / Nitrite: x   Leuk Esterase: x / RBC: x / WBC x   Sq Epi: x / Non Sq Epi: x / Bacteria: x        RADIOLOGY & ADDITIONAL TESTS:    Imaging Personally Reviewed:    Consultant(s) Notes Reviewed:       Patient is a 89y old  Female who presents with a chief complaint of Agitation at her living facility (22 Sep 2024 07:38)      SUBJECTIVE / OVERNIGHT EVENTS: No events over night. Pt denies having CP, SOB, n/v. No urinary symptoms     Review of Systems:     MEDICATIONS  (STANDING):  atorvastatin 10 milliGRAM(s) Oral at bedtime  heparin   Injectable 5000 Unit(s) SubCutaneous every 8 hours  latanoprost 0.005% Ophthalmic Solution 1 Drop(s) Both EYES at bedtime  levoFLOXacin IVPB 750 milliGRAM(s) IV Intermittent every 48 hours  levoFLOXacin IVPB      memantine 10 milliGRAM(s) Oral two times a day  mirtazapine Soltab 45 milliGRAM(s) Oral at bedtime  pantoprazole    Tablet 40 milliGRAM(s) Oral before breakfast  polyethylene glycol 3350 17 Gram(s) Oral two times a day  senna 2 Tablet(s) Oral at bedtime  sertraline 50 milliGRAM(s) Oral daily  sodium chloride 0.9%. 1000 milliLiter(s) (50 mL/Hr) IV Continuous <Continuous>    MEDICATIONS  (PRN):  acetaminophen     Tablet .. 650 milliGRAM(s) Oral every 6 hours PRN Temp greater or equal to 38C (100.4F), Mild Pain (1 - 3)  aluminum hydroxide/magnesium hydroxide/simethicone Suspension 30 milliLiter(s) Oral every 4 hours PRN Dyspepsia  guaifenesin/dextromethorphan Oral Liquid 10 milliLiter(s) Oral every 4 hours PRN Cough  melatonin 3 milliGRAM(s) Oral at bedtime PRN Insomnia  OLANZapine Injectable 1.25 milliGRAM(s) IntraMuscular every 8 hours PRN agitation if unable to give quetiapine  ondansetron Injectable 4 milliGRAM(s) IV Push every 8 hours PRN Nausea and/or Vomiting  QUEtiapine 12.5 milliGRAM(s) Oral every 8 hours PRN agitation      PHYSICAL EXAM:  ICU Vital Signs Last 24 Hrs  T(C): 36.4 (23 Sep 2024 05:56), Max: 37.2 (22 Sep 2024 19:37)  T(F): 97.5 (23 Sep 2024 05:56), Max: 99 (22 Sep 2024 19:37)  HR: 67 (23 Sep 2024 05:56) (67 - 81)  BP: 153/60 (23 Sep 2024 05:56) (124/61 - 153/60)  BP(mean): --  ABP: --  ABP(mean): --  RR: 18 (23 Sep 2024 05:56) (18 - 18)  SpO2: 98% (23 Sep 2024 05:56) (91% - 98%)    O2 Parameters below as of 23 Sep 2024 05:56  Patient On (Oxygen Delivery Method): room air    CONSTITUTIONAL: NAD, well-developed  EYES: EOMI; conjunctiva and sclera clear  ENMT: Moist oral mucosa  RESPIRATORY: Normal respiratory effort; lungs are clear to auscultation bilaterally  CARDIOVASCULAR: Regular rate and rhythm, normal S1 and S2, no murmur; No lower extremity edema  ABDOMEN: Mild TTP of abd- that pt stated it was bc she needed to urinate   MUSCULOSKELETAL:   no clubbing or cyanosis of digits; no joint swelling or tenderness to palpation  PSYCH: A+O to person, place; affect appropriate. Poor short term memory -saw pt 3 times today and each time she did not remember who I was and what we discussed   NEUROLOGY: CN 2-12 are intact and symmetric; no gross sensory deficits   SKIN: No rashes; no palpable lesions    LABS:  CAPILLARY BLOOD GLUCOSE                              11.3   8.06  )-----------( 207      ( 23 Sep 2024 04:23 )             34.8     09-23    142  |  107  |  23  ----------------------------<  82  4.3   |  21[L]  |  1.26    Ca    11.1[H]      23 Sep 2024 04:23    TPro  6.4  /  Alb  3.3  /  TBili  0.3  /  DBili  x   /  AST  20  /  ALT  14  /  AlkPhos  90  09-23          Urinalysis Basic - ( 23 Sep 2024 04:23 )    Color: x / Appearance: x / SG: x / pH: x  Gluc: 82 mg/dL / Ketone: x  / Bili: x / Urobili: x   Blood: x / Protein: x / Nitrite: x   Leuk Esterase: x / RBC: x / WBC x   Sq Epi: x / Non Sq Epi: x / Bacteria: x        RADIOLOGY & ADDITIONAL TESTS:    Imaging Personally Reviewed:    Consultant(s) Notes Reviewed:

## 2024-09-23 NOTE — PROGRESS NOTE ADULT - PROBLEM SELECTOR PLAN 5
- Pyuria on UA but denies UTI complaints  - urine culture cancelled by lab Pyuria on UA but denies UTI complaints  - urine culture cancelled by lab. No need to repeat at this time. Pt also on Levaquin for PNA. Will monitor clinically.

## 2024-09-23 NOTE — CHART NOTE - NSCHARTNOTEFT_GEN_A_CORE
Pt w/ episode of BRBPR small amt, in NAD, VSS, ? hemorrhoids, will repeat H/H w/ Type, DVT Heparin dcd, apply Renea [place], will sign  out to CTM, d/w Attending       Kayla Posadas, NP paget [phone]

## 2024-09-23 NOTE — PROGRESS NOTE ADULT - PROBLEM SELECTOR PLAN 1
- Worsening agitation x months as per daughter, patient with significant agitation and aggression against staff at her [facility], daughter states police also had to be called for her behavior in the past  - Daughter states patient more paranoid and agitated x1 year and has been progressively worsening, she has accused another daughter of stealing her money and no longer speaks to her   - Also was noted to have increased agitation on her first admission to [facility] earlier this month, was started on seroquel at that time but unclear if she continued to receive at the [facility];  she was using at [facility] as per daughter Neda  - appreciate psych recs   - continue with zoloft 50 mg and remeron 45 mg qhs; seroquel 12.5 q6 hr prn and zyprexa 1.25 mg q6 prn for agitation Worsening agitation x months as per daughter, patient with significant agitation and aggression against staff at her [facility], daughter states police also had to be called for her behavior in the past  Daughter states patient more paranoid and agitated x1 year and has been progressively worsening, she has accused another daughter of stealing her money and no longer speaks to her   Also was noted to have increased agitation on her first admission to [facility] earlier this month, was started on seroquel at that time but unclear if she continued to receive at the [facility];  she was using at [facility] as per daughter Neda  - appreciate psych recs   - continue with zoloft 50 mg and remeron 45 mg qhs; seroquel 12.5 q6 hr prn and zyprexa 1.25 mg q6 prn for agitation

## 2024-09-23 NOTE — GOALS OF CARE CONVERSATION - ADVANCED CARE PLANNING - CONVERSATION DETAILS
Spoke with pts daughter Neda. Neda reported that she is the health care proxy and sent over documentation of the HCP form which has been placed in the patients physical chart. Of note patients sister Fadumo was previously noted to be the HCP however that was a few years ago and Neda brought updated forms. Nevertheless, Neda expresses that she keeps in close communication with her sister. However Neda has been more involved in patients care and Fadumo is currently out of the country.   Discussed code status with Neda. Explained that it refers to chest compression and intubation (if patient heart was to stop or she could not breath). Potential for complications were also discussed with this intervention especially given the patients age and frailty. Neda expressed that she believes there was prior documentation that patient should be DNR/DNI. She would like patient to remain DNR/DNI. Molst form completed and placed in the chart.     Spent about 15-17 min discussing advanced care planning and pts current medical status.

## 2024-09-23 NOTE — PROGRESS NOTE ADULT - ASSESSMENT
This is a 89F with history as above who is sent in from her [facility] for worsening agitation here found to have a worsening cough.  This is a 89F with history as above who is sent in from her shelter for worsening agitation here found to have  PNA. Currently awaiting placement

## 2024-09-23 NOTE — PROGRESS NOTE ADULT - PROBLEM SELECTOR PLAN 4
- Cr stabilized - pt urinating without issues, no retention   - encourage po hydration Cr stabilized - pt urinating without issues, no retention   - encourage po hydration

## 2024-09-23 NOTE — PROGRESS NOTE ADULT - PROBLEM SELECTOR PLAN 2
- pt with hypercalcemia on labs  - albumin wnl  - would start gentle hydration and monitor  - would d/c cholecalciferol   - check PTH, PTHrp,vit d 25, vit d 1-25, spep, upep, free light chains Pt with hypercalcemia on labs  Albumin wnl  W/u for alternative causes so far revealed elevated PTH, vit d 25 wnl, and mildly elevated free kappa/lambda light chain ratio of 1.87    Given elevated PTH level likely primary hyerparathyroidism.    -Encourage PO hydration. Currently on ivfs--> will plan to dc if pt has adequate PO   -Cholecalciferol now dcd   -F/u pending PTHrp and vit d 1-25  -Would obtain ionized calcium if next blood draw planned in patient   -Outpt f/u. Pt does not have severe hypercalcemia. Gven pts age likely not a surgical candidate. Findings and thoughts discussed with patients daughter/ HCP Neda.

## 2024-09-24 DIAGNOSIS — K62.5 HEMORRHAGE OF ANUS AND RECTUM: ICD-10-CM

## 2024-09-24 LAB — VIT D25+D1,25 OH+D1,25 PNL SERPL-MCNC: 67 PG/ML — SIGNIFICANT CHANGE UP (ref 19.9–79.3)

## 2024-09-24 PROCEDURE — 99232 SBSQ HOSP IP/OBS MODERATE 35: CPT

## 2024-09-24 RX ADMIN — ATORVASTATIN CALCIUM 10 MILLIGRAM(S): 10 TABLET, FILM COATED ORAL at 21:33

## 2024-09-24 RX ADMIN — Medication 50 MILLILITER(S): at 12:37

## 2024-09-24 RX ADMIN — LATANOPROST 1 DROP(S): 50 SOLUTION OPHTHALMIC at 21:34

## 2024-09-24 RX ADMIN — Medication 50 MILLILITER(S): at 04:17

## 2024-09-24 RX ADMIN — MEMANTINE 10 MILLIGRAM(S): 10 TABLET ORAL at 17:32

## 2024-09-24 RX ADMIN — Medication 2 TABLET(S): at 21:31

## 2024-09-24 RX ADMIN — Medication 1 APPLICATION(S): at 17:33

## 2024-09-24 RX ADMIN — Medication 1 APPLICATION(S): at 12:38

## 2024-09-24 RX ADMIN — MIRTAZAPINE 45 MILLIGRAM(S): 30 TABLET, FILM COATED ORAL at 21:31

## 2024-09-24 RX ADMIN — Medication 17 GRAM(S): at 17:30

## 2024-09-24 RX ADMIN — SERTRALINE HYDROCHLORIDE 50 MILLIGRAM(S): 100 TABLET, FILM COATED ORAL at 12:37

## 2024-09-24 NOTE — PHYSICAL THERAPY INITIAL EVALUATION ADULT - GENERAL OBSERVATIONS, REHAB EVAL
Consult received, chart reviewed. Patient received seated in chair, no apparent distress, +pulse ox. Pt. agreeable to participate in PT.

## 2024-09-24 NOTE — PROVIDER CONTACT NOTE (OTHER) - BACKGROUND
Pt admitted for altered mental status associated with possible UTI
hx of pneumonia, basal cell cancer, Melanoma

## 2024-09-24 NOTE — PROGRESS NOTE ADULT - PROBLEM SELECTOR PLAN 9
DVT ppx -HSQ held for BRBPR on 9/23. SCDs for now  Diet - No known issues with dysphagia as per patient and daughter ->Pt tolerating regular diet  Dispo: Pending placement. Will continue to discuss daily w/ SW/CM. PPD placed per SW/CM request for placement. PPD placed 9/23/24 at approximately 345 pm. PPD to be read on 9/25. - continue with latanoprost eye drops

## 2024-09-24 NOTE — PROGRESS NOTE ADULT - PROBLEM SELECTOR PLAN 6
- psych evaluation appreciated   - would continue with memantine, mirtazapine and sertraline  - For mild agitation seroquel PO 12.5mg PO q6h PRN. For severe agitation Zyprexa IM 1.25mg q6h PRN,  can give additional 1.25mg for refractory agitation Pyuria on UA but denies UTI complaints  - urine culture cancelled by lab. No need to repeat at this time. Pt also on Levaquin for PNA. Will monitor clinically.

## 2024-09-24 NOTE — PHYSICAL THERAPY INITIAL EVALUATION ADULT - ADDITIONAL COMMENTS
Per chart, pt. lives at an assisted living facility and ambulates with a rolling walker. Pt. had home PT services and has aide services.    Pt. was left seated in chair post PT Evaluation, no apparent distress, all lines intact. RN made aware.

## 2024-09-24 NOTE — PROGRESS NOTE ADULT - PROBLEM SELECTOR PLAN 7
- continue with atorvastatin - psych evaluation appreciated   - would continue with memantine, mirtazapine and sertraline  - For mild agitation seroquel PO 12.5mg PO q6h PRN. For severe agitation Zyprexa IM 1.25mg q6h PRN,  can give additional 1.25mg for refractory agitation

## 2024-09-24 NOTE — PROGRESS NOTE ADULT - ASSESSMENT
This is a 89F with history as above who is sent in from her [facility] for worsening agitation here found to have  PNA. Currently awaiting placement

## 2024-09-24 NOTE — PROGRESS NOTE ADULT - PROBLEM SELECTOR PLAN 5
Pyuria on UA but denies UTI complaints  - urine culture cancelled by lab. No need to repeat at this time. Pt also on Levaquin for PNA. Will monitor clinically. Cr stabilized - pt urinating without issues, no retention   - encourage po hydration

## 2024-09-24 NOTE — PROGRESS NOTE ADULT - PROBLEM SELECTOR PLAN 4
Cr stabilized - pt urinating without issues, no retention   - encourage po hydration Improved. Suspect In the setting of PNA  CXR with persistent "Right upper lobe opacities and left basilar haziness"  CT chest reviewed - stable interstitial lung disease  BCx x2 - neg to date      - For now treating for possible PNA, [facility] paper work states patient with allergy to keflex but unclear on what the allergy is, has been on ciprofloxacin before so pt placed on levofloxacin (750mg q48 based on her renal clearance) with plan for 5 days of antibiotics.   - CT chest w/ stable interstitial lung disease - would benefit from outpt followup   - Incentive spirometry and chest PT also

## 2024-09-24 NOTE — PROGRESS NOTE ADULT - PROBLEM SELECTOR PLAN 3
Improved. Suspect In the setting of PNA  CXR with persistent "Right upper lobe opacities and left basilar haziness"  CT chest reviewed - stable interstitial lung disease  BCx x2 - neg to date      - For now treating for possible PNA, [facility] paper work states patient with allergy to keflex but unclear on what the allergy is, has been on ciprofloxacin before so pt placed on levofloxacin (750mg q48 based on her renal clearance) with plan for 5 days of antibiotics.   - CT chest w/ stable interstitial lung disease - would benefit from outpt followup   - Incentive spirometry and chest PT also Pt with hypercalcemia on labs  Albumin wnl  W/u for alternative causes so far revealed elevated PTH, vit d 25 wnl, and mildly elevated free kappa/lambda light chain ratio of 1.87    Given elevated PTH level likely primary hyerparathyroidism.    -Encourage PO hydration. Currently on ivfs--> will plan to dc if pt has adequate PO   -Cholecalciferol now dcd   -F/u pending PTHrp and vit d 1-25  -Would obtain ionized calcium if next blood draw planned in patient   -Outpt f/u. Pt does not have severe hypercalcemia. Gven pts age likely not a surgical candidate. Findings and thoughts discussed with patients daughter/ HCP Neda.

## 2024-09-24 NOTE — PROGRESS NOTE ADULT - SUBJECTIVE AND OBJECTIVE BOX
Patient is a 89y old  Female who presents with a chief complaint of Agitation at her living facility (23 Sep 2024 08:28)      SUBJECTIVE / OVERNIGHT EVENTS:   Yesterday evening pt noted to have episode of BRBPR that was a small amt by ACP and nurse, Pt was hemodynamically stable at the time. HSQ was dcd and CBC obtained and was stable.   Today    MEDICATIONS  (STANDING):  atorvastatin 10 milliGRAM(s) Oral at bedtime  latanoprost 0.005% Ophthalmic Solution 1 Drop(s) Both EYES at bedtime  levoFLOXacin IVPB      levoFLOXacin IVPB 750 milliGRAM(s) IV Intermittent every 48 hours  memantine 10 milliGRAM(s) Oral two times a day  mirtazapine Soltab 45 milliGRAM(s) Oral at bedtime  pantoprazole    Tablet 40 milliGRAM(s) Oral before breakfast  polyethylene glycol 3350 17 Gram(s) Oral two times a day  PPD  5 Tuberculin Unit(s) Injectable 5 Unit(s) IntraDermal once  senna 2 Tablet(s) Oral at bedtime  sertraline 50 milliGRAM(s) Oral daily  sodium chloride 0.9%. 1000 milliLiter(s) (50 mL/Hr) IV Continuous <Continuous>  witch hazel Pads 1 Application(s) Topical two times a day    MEDICATIONS  (PRN):  acetaminophen     Tablet .. 650 milliGRAM(s) Oral every 6 hours PRN Temp greater or equal to 38C (100.4F), Mild Pain (1 - 3)  aluminum hydroxide/magnesium hydroxide/simethicone Suspension 30 milliLiter(s) Oral every 4 hours PRN Dyspepsia  guaifenesin/dextromethorphan Oral Liquid 10 milliLiter(s) Oral every 4 hours PRN Cough  melatonin 3 milliGRAM(s) Oral at bedtime PRN Insomnia  OLANZapine Injectable 1.25 milliGRAM(s) IntraMuscular every 8 hours PRN agitation if unable to give quetiapine  ondansetron Injectable 4 milliGRAM(s) IV Push every 8 hours PRN Nausea and/or Vomiting  QUEtiapine 12.5 milliGRAM(s) Oral every 8 hours PRN agitation      PHYSICAL EXAM:  ICU Vital Signs Last 24 Hrs  T(C): 36.6 (24 Sep 2024 05:20), Max: 36.7 (23 Sep 2024 11:53)  T(F): 97.8 (24 Sep 2024 05:20), Max: 98.1 (23 Sep 2024 11:53)  HR: 65 (24 Sep 2024 05:20) (65 - 91)  BP: 150/57 (24 Sep 2024 05:20) (147/64 - 156/73)  BP(mean): --  ABP: --  ABP(mean): --  RR: 18 (24 Sep 2024 05:20) (18 - 18)  SpO2: 98% (24 Sep 2024 05:20) (91% - 98%)    O2 Parameters below as of 24 Sep 2024 05:20  Patient On (Oxygen Delivery Method): room air  CONSTITUTIONAL: NAD, well-developed  EYES: EOMI; conjunctiva and sclera clear  ENMT: Moist oral mucosa  RESPIRATORY: Normal respiratory effort; lungs are clear to auscultation bilaterally  CARDIOVASCULAR: Regular rate and rhythm, normal S1 and S2, no murmur; No lower extremity edema  ABDOMEN: Mild TTP of abd- that pt stated it was bc she needed to urinate   MUSCULOSKELETAL:   no clubbing or cyanosis of digits; no joint swelling or tenderness to palpation  PSYCH: A+O to person, place; affect appropriate. Poor short term memory -saw pt 3 times today and each time she did not remember who I was and what we discussed   NEUROLOGY: CN 2-12 are intact and symmetric; no gross sensory deficits   SKIN: No rashes; no palpable lesions  LABS:  CAPILLARY BLOOD GLUCOSE                              11.7   8.30  )-----------( 213      ( 23 Sep 2024 18:48 )             35.7     09-23    142  |  107  |  23  ----------------------------<  82  4.3   |  21[L]  |  1.26    Ca    11.1[H]      23 Sep 2024 04:23    TPro  6.4  /  Alb  3.3  /  TBili  0.3  /  DBili  x   /  AST  20  /  ALT  14  /  AlkPhos  90  09-23          Urinalysis Basic - ( 23 Sep 2024 04:23 )    Color: x / Appearance: x / SG: x / pH: x  Gluc: 82 mg/dL / Ketone: x  / Bili: x / Urobili: x   Blood: x / Protein: x / Nitrite: x   Leuk Esterase: x / RBC: x / WBC x   Sq Epi: x / Non Sq Epi: x / Bacteria: x        RADIOLOGY & ADDITIONAL TESTS:    Imaging Personally Reviewed:    Consultant(s) Notes Reviewed:      Care Discussed with Consultants/Other Providers: Patient is a 89y old  Female who presents with a chief complaint of Agitation at her living facility (23 Sep 2024 08:28)      SUBJECTIVE / OVERNIGHT EVENTS:   Yesterday evening pt noted to have episode of BRBPR that was a small amt by ACP and nurse, Pt was hemodynamically stable at the time. HSQ was dcd and CBC obtained and was stable.   Today pt denies CP, SOB, n/v.     MEDICATIONS  (STANDING):  atorvastatin 10 milliGRAM(s) Oral at bedtime  latanoprost 0.005% Ophthalmic Solution 1 Drop(s) Both EYES at bedtime  levoFLOXacin IVPB      levoFLOXacin IVPB 750 milliGRAM(s) IV Intermittent every 48 hours  memantine 10 milliGRAM(s) Oral two times a day  mirtazapine Soltab 45 milliGRAM(s) Oral at bedtime  pantoprazole    Tablet 40 milliGRAM(s) Oral before breakfast  polyethylene glycol 3350 17 Gram(s) Oral two times a day  PPD  5 Tuberculin Unit(s) Injectable 5 Unit(s) IntraDermal once  senna 2 Tablet(s) Oral at bedtime  sertraline 50 milliGRAM(s) Oral daily  sodium chloride 0.9%. 1000 milliLiter(s) (50 mL/Hr) IV Continuous <Continuous>  witch hazel Pads 1 Application(s) Topical two times a day    MEDICATIONS  (PRN):  acetaminophen     Tablet .. 650 milliGRAM(s) Oral every 6 hours PRN Temp greater or equal to 38C (100.4F), Mild Pain (1 - 3)  aluminum hydroxide/magnesium hydroxide/simethicone Suspension 30 milliLiter(s) Oral every 4 hours PRN Dyspepsia  guaifenesin/dextromethorphan Oral Liquid 10 milliLiter(s) Oral every 4 hours PRN Cough  melatonin 3 milliGRAM(s) Oral at bedtime PRN Insomnia  OLANZapine Injectable 1.25 milliGRAM(s) IntraMuscular every 8 hours PRN agitation if unable to give quetiapine  ondansetron Injectable 4 milliGRAM(s) IV Push every 8 hours PRN Nausea and/or Vomiting  QUEtiapine 12.5 milliGRAM(s) Oral every 8 hours PRN agitation      PHYSICAL EXAM:  ICU Vital Signs Last 24 Hrs  T(C): 36.6 (24 Sep 2024 05:20), Max: 36.7 (23 Sep 2024 11:53)  T(F): 97.8 (24 Sep 2024 05:20), Max: 98.1 (23 Sep 2024 11:53)  HR: 65 (24 Sep 2024 05:20) (65 - 91)  BP: 150/57 (24 Sep 2024 05:20) (147/64 - 156/73)  BP(mean): --  ABP: --  ABP(mean): --  RR: 18 (24 Sep 2024 05:20) (18 - 18)  SpO2: 98% (24 Sep 2024 05:20) (91% - 98%)    O2 Parameters below as of 24 Sep 2024 05:20  Patient On (Oxygen Delivery Method): room air  CONSTITUTIONAL: NAD, well-developed  EYES: EOMI; conjunctiva and sclera clear  ENMT: Moist oral mucosa  RESPIRATORY: Normal respiratory effort; lungs are clear to auscultation bilaterally  CARDIOVASCULAR: Regular rate and rhythm, normal S1 and S2, no murmur; No lower extremity edema  ABDOMEN: Mild TTP of abd- that pt stated it was bc she needed to urinate   MUSCULOSKELETAL:   no clubbing or cyanosis of digits; no joint swelling or tenderness to palpation  PSYCH: A+O to person, place; affect appropriate. Poor short term memory -saw pt 3 times today and each time she did not remember who I was and what we discussed   NEUROLOGY: CN 2-12 are intact and symmetric; no gross sensory deficits   SKIN: No rashes; no palpable lesions  LABS:  CAPILLARY BLOOD GLUCOSE                              11.7   8.30  )-----------( 213      ( 23 Sep 2024 18:48 )             35.7     09-23    142  |  107  |  23  ----------------------------<  82  4.3   |  21[L]  |  1.26    Ca    11.1[H]      23 Sep 2024 04:23    TPro  6.4  /  Alb  3.3  /  TBili  0.3  /  DBili  x   /  AST  20  /  ALT  14  /  AlkPhos  90  09-23          Urinalysis Basic - ( 23 Sep 2024 04:23 )    Color: x / Appearance: x / SG: x / pH: x  Gluc: 82 mg/dL / Ketone: x  / Bili: x / Urobili: x   Blood: x / Protein: x / Nitrite: x   Leuk Esterase: x / RBC: x / WBC x   Sq Epi: x / Non Sq Epi: x / Bacteria: x        RADIOLOGY & ADDITIONAL TESTS:    Imaging Personally Reviewed:    Consultant(s) Notes Reviewed:      Care Discussed with Consultants/Other Providers:

## 2024-09-24 NOTE — PROGRESS NOTE ADULT - PROBLEM SELECTOR PLAN 1
Worsening agitation x months as per daughter, patient with significant agitation and aggression against staff at her [facility], daughter states police also had to be called for her behavior in the past  Daughter states patient more paranoid and agitated x1 year and has been progressively worsening, she has accused another daughter of stealing her money and no longer speaks to her   Also was noted to have increased agitation on her first admission to [facility] earlier this month, was started on seroquel at that time but unclear if she continued to receive at the [facility];  she was using at [facility] as per daughter Neda  - appreciate psych recs   - continue with zoloft 50 mg and remeron 45 mg qhs; seroquel 12.5 q6 hr prn and zyprexa 1.25 mg q6 prn for agitation Yesterday 9/23 evening pt noted to have episode of BRBPR that was a small amount by ACP and nurse, Pt was hemodynamically stable at the time. HSQ was dcd and CBC obtained and was stable.     Could be in the setting of hemorrhoids. Spoke to pts daughter who states patient as a history of hemorrhoids. DDx for alternative etiologies considered and are broad    -Given CBC and pt stable, would monitor for now   -If worsening bleeding, hemodynamically stable or dropping hemoglobin will need to consider further w/u and GI eval  -continue to hold HSQ for now

## 2024-09-24 NOTE — PROGRESS NOTE ADULT - PROBLEM SELECTOR PLAN 10
DVT ppx -HSQ held for BRBPR on 9/23. SCDs for now  Diet - No known issues with dysphagia as per patient and daughter ->Pt tolerating regular diet  Dispo: Pending placement. PT rec home PT. Will continue to discuss daily w/ SW/CM. PPD placed per SW/CM request for placement. PPD placed 9/23/24 at approximately 345 pm. PPD to be read on 9/25.      Plan d/w pts daughter/HCP Neda.

## 2024-09-24 NOTE — PROVIDER CONTACT NOTE (OTHER) - ASSESSMENT
AOX2, VSS, no s/s of distress, pt had blood stains after bm believed to be due to hemorrhoids.
Pt in no noted distress. Bowel movement with substantial amount of blood, possible hemorrhoid

## 2024-09-24 NOTE — PROGRESS NOTE ADULT - PROBLEM SELECTOR PLAN 2
Pt with hypercalcemia on labs  Albumin wnl  W/u for alternative causes so far revealed elevated PTH, vit d 25 wnl, and mildly elevated free kappa/lambda light chain ratio of 1.87    Given elevated PTH level likely primary hyerparathyroidism.    -Encourage PO hydration. Currently on ivfs--> will plan to dc if pt has adequate PO   -Cholecalciferol now dcd   -F/u pending PTHrp and vit d 1-25  -Would obtain ionized calcium if next blood draw planned in patient   -Outpt f/u. Pt does not have severe hypercalcemia. Gven pts age likely not a surgical candidate. Findings and thoughts discussed with patients daughter/ HCP Neda. Worsening agitation x months as per daughter, patient with significant agitation and aggression against staff at her [facility], daughter states police also had to be called for her behavior in the past  Daughter states patient more paranoid and agitated x1 year and has been progressively worsening, she has accused another daughter of stealing her money and no longer speaks to her   Also was noted to have increased agitation on her first admission to [facility] earlier this month, was started on seroquel at that time but unclear if she continued to receive at the [facility];  she was using at [facility] as per daughter Neda  - appreciate psych recs   - continue with zoloft 50 mg and remeron 45 mg qhs; seroquel 12.5 q6 hr prn and zyprexa 1.25 mg q6 prn for agitation

## 2024-09-25 LAB
CULTURE RESULTS: SIGNIFICANT CHANGE UP
CULTURE RESULTS: SIGNIFICANT CHANGE UP
SPECIMEN SOURCE: SIGNIFICANT CHANGE UP
SPECIMEN SOURCE: SIGNIFICANT CHANGE UP

## 2024-09-25 PROCEDURE — 99232 SBSQ HOSP IP/OBS MODERATE 35: CPT

## 2024-09-25 RX ORDER — MIRTAZAPINE 30 MG/1
1 TABLET, FILM COATED ORAL
Qty: 30 | Refills: 0
Start: 2024-09-25 | End: 2024-10-24

## 2024-09-25 RX ORDER — FOLIC ACID/MULTIVIT,IRON,MINER 0.4MG-18MG
1 TABLET,CHEWABLE ORAL
Refills: 0 | DISCHARGE

## 2024-09-25 RX ORDER — DOCUSATE SODIUM 100 MG
1 CAPSULE ORAL
Qty: 30 | Refills: 0
Start: 2024-09-25 | End: 2024-10-24

## 2024-09-25 RX ORDER — LATANOPROST 50 UG/ML
1 SOLUTION OPHTHALMIC
Qty: 1 | Refills: 0
Start: 2024-09-25 | End: 2024-10-24

## 2024-09-25 RX ORDER — MIRTAZAPINE 30 MG/1
1 TABLET, FILM COATED ORAL
Qty: 0 | Refills: 0 | DISCHARGE
Start: 2024-09-25

## 2024-09-25 RX ORDER — PANTOPRAZOLE SODIUM 40 MG/1
1 TABLET, DELAYED RELEASE ORAL
Qty: 0 | Refills: 0 | DISCHARGE
Start: 2024-09-25

## 2024-09-25 RX ORDER — PANTOPRAZOLE SODIUM 40 MG/1
1 TABLET, DELAYED RELEASE ORAL
Qty: 30 | Refills: 0
Start: 2024-09-25 | End: 2024-10-24

## 2024-09-25 RX ORDER — MEMANTINE 10 MG/1
1 TABLET ORAL
Qty: 60 | Refills: 0
Start: 2024-09-25 | End: 2024-10-24

## 2024-09-25 RX ORDER — BUSPIRONE HCL 5 MG
1 TABLET ORAL
Refills: 0 | DISCHARGE

## 2024-09-25 RX ORDER — QUETIAPINE FUMARATE 50 MG/1
0.5 TABLET, FILM COATED ORAL
Qty: 45 | Refills: 0
Start: 2024-09-25 | End: 2024-10-24

## 2024-09-25 RX ORDER — ACETAMINOPHEN 325 MG
2 TABLET ORAL
Qty: 0 | Refills: 0 | DISCHARGE
Start: 2024-09-25

## 2024-09-25 RX ORDER — SERTRALINE HYDROCHLORIDE 100 MG/1
1 TABLET, FILM COATED ORAL
Qty: 30 | Refills: 0
Start: 2024-09-25 | End: 2024-10-24

## 2024-09-25 RX ORDER — ATORVASTATIN CALCIUM 10 MG/1
1 TABLET, FILM COATED ORAL
Qty: 30 | Refills: 0
Start: 2024-09-25 | End: 2024-10-24

## 2024-09-25 RX ORDER — LATANOPROST 50 UG/ML
1 SOLUTION OPHTHALMIC
Qty: 0 | Refills: 0 | DISCHARGE
Start: 2024-09-25

## 2024-09-25 RX ORDER — MAG HYDROX/ALUMINUM HYD/SIMETH 200-200-20
30 SUSPENSION, ORAL (FINAL DOSE FORM) ORAL
Qty: 0 | Refills: 0 | DISCHARGE
Start: 2024-09-25

## 2024-09-25 RX ORDER — PSYLLIUM HUSK 0.4 G
1 CAPSULE ORAL
Refills: 0 | DISCHARGE

## 2024-09-25 RX ORDER — PEDIATRIC MULTIVIT 61/D3/VIT K 1500-800
1 CAPSULE ORAL
Qty: 30 | Refills: 0
Start: 2024-09-25 | End: 2024-10-24

## 2024-09-25 RX ORDER — 5-HYDROXYTRYPTOPHAN (5-HTP) 100 MG
1 TABLET,DISINTEGRATING ORAL
Qty: 30 | Refills: 0
Start: 2024-09-25 | End: 2024-10-24

## 2024-09-25 RX ORDER — ACETAMINOPHEN 325 MG
2 TABLET ORAL
Qty: 240 | Refills: 0
Start: 2024-09-25 | End: 2024-10-24

## 2024-09-25 RX ORDER — OMEPRAZOLE 40 MG/1
1 CAPSULE, DELAYED RELEASE ORAL
Refills: 0 | DISCHARGE

## 2024-09-25 RX ORDER — MIRTAZAPINE 30 MG/1
1 TABLET, FILM COATED ORAL
Refills: 0
Start: 2024-09-25

## 2024-09-25 RX ORDER — SERTRALINE HYDROCHLORIDE 50 MG/1
1 TABLET, FILM COATED ORAL
Refills: 0 | DISCHARGE

## 2024-09-25 RX ORDER — 5-HYDROXYTRYPTOPHAN (5-HTP) 100 MG
1 TABLET,DISINTEGRATING ORAL
Qty: 0 | Refills: 0 | DISCHARGE
Start: 2024-09-25

## 2024-09-25 RX ORDER — SERTRALINE HYDROCHLORIDE 100 MG/1
1 TABLET, FILM COATED ORAL
Qty: 0 | Refills: 0 | DISCHARGE
Start: 2024-09-25

## 2024-09-25 RX ORDER — MAG HYDROX/ALUMINUM HYD/SIMETH 200-200-20
30 SUSPENSION, ORAL (FINAL DOSE FORM) ORAL
Qty: 5400 | Refills: 0
Start: 2024-09-25 | End: 2024-10-24

## 2024-09-25 RX ORDER — MEMANTINE 10 MG/1
1 TABLET ORAL
Qty: 0 | Refills: 0 | DISCHARGE
Start: 2024-09-25

## 2024-09-25 RX ORDER — ATORVASTATIN CALCIUM 10 MG/1
1 TABLET, FILM COATED ORAL
Qty: 0 | Refills: 0 | DISCHARGE
Start: 2024-09-25

## 2024-09-25 RX ORDER — DOCUSATE CALCIUM 240 MG/1
1 CAPSULE ORAL
Refills: 0 | DISCHARGE

## 2024-09-25 RX ORDER — MEMANTINE 7 MG/1
1 CAPSULE, EXTENDED RELEASE ORAL
Refills: 0 | DISCHARGE

## 2024-09-25 RX ADMIN — SERTRALINE HYDROCHLORIDE 50 MILLIGRAM(S): 100 TABLET, FILM COATED ORAL at 12:40

## 2024-09-25 RX ADMIN — ATORVASTATIN CALCIUM 10 MILLIGRAM(S): 10 TABLET, FILM COATED ORAL at 21:48

## 2024-09-25 RX ADMIN — Medication 17 GRAM(S): at 05:56

## 2024-09-25 RX ADMIN — LATANOPROST 1 DROP(S): 50 SOLUTION OPHTHALMIC at 21:48

## 2024-09-25 RX ADMIN — PANTOPRAZOLE SODIUM 40 MILLIGRAM(S): 40 TABLET, DELAYED RELEASE ORAL at 05:56

## 2024-09-25 RX ADMIN — Medication 2 TABLET(S): at 21:48

## 2024-09-25 RX ADMIN — MIRTAZAPINE 45 MILLIGRAM(S): 30 TABLET, FILM COATED ORAL at 21:48

## 2024-09-25 RX ADMIN — Medication 1 APPLICATION(S): at 05:56

## 2024-09-25 RX ADMIN — MEMANTINE 10 MILLIGRAM(S): 10 TABLET ORAL at 05:55

## 2024-09-25 RX ADMIN — MEMANTINE 10 MILLIGRAM(S): 10 TABLET ORAL at 17:57

## 2024-09-25 RX ADMIN — Medication 17 GRAM(S): at 17:56

## 2024-09-25 RX ADMIN — Medication 50 MILLILITER(S): at 06:20

## 2024-09-25 NOTE — BH CONSULTATION LIAISON PROGRESS NOTE - NSBHFUPINTERVALHXFT_PSY_A_CORE
Pt seen at bedside. No behavioral issues noted. No prns required. Attempted to call daughter per request; went to .    She states she is doing okay other than wanting to leave. A&Ox4. Sleep, appetite, mood stable. Denies anxiety. Denies SI/HI/AVH. No other concerns.

## 2024-09-25 NOTE — PROGRESS NOTE ADULT - PROBLEM SELECTOR PLAN 4
Improved. Suspect In the setting of PNA  CXR with persistent "Right upper lobe opacities and left basilar haziness"  CT chest reviewed - stable interstitial lung disease  BCx x2 - neg to date      - For now treating for possible PNA, [facility] paper work states patient with allergy to keflex but unclear on what the allergy is, has been on ciprofloxacin before so pt placed on levofloxacin (750mg q48 based on her renal clearance) with plan for 5 days of antibiotics.   - CT chest w/ stable interstitial lung disease - would benefit from outpt followup   - Incentive spirometry and chest PT also Improved. Suspect In the setting of PNA  CXR with persistent "Right upper lobe opacities and left basilar haziness"  CT chest reviewed - stable interstitial lung disease  BCx x2 - neg to date      - For now treating for possible PNA, long term paper work states patient with allergy to keflex but unclear on what the allergy is, has been on ciprofloxacin before so pt placed on levofloxacin (750mg q48 based on her renal clearance) with plan for total 7 days of antibiotics. Last day of abx 9/25 (first dose received on 9/20).   - CT chest w/ stable interstitial lung disease - would benefit from outpt followup   - Incentive spirometry and chest PT also

## 2024-09-25 NOTE — PROGRESS NOTE ADULT - PROBLEM SELECTOR PLAN 7
- psych evaluation appreciated   - would continue with memantine, mirtazapine and sertraline  - For mild agitation seroquel PO 12.5mg PO q6h PRN. For severe agitation Zyprexa IM 1.25mg q6h PRN,  can give additional 1.25mg for refractory agitation

## 2024-09-25 NOTE — PROGRESS NOTE ADULT - PROBLEM SELECTOR PLAN 3
Pt with hypercalcemia on labs  Albumin wnl  W/u for alternative causes so far revealed elevated PTH, vit d 25 wnl, and mildly elevated free kappa/lambda light chain ratio of 1.87    Given elevated PTH level likely primary hyerparathyroidism.    -Encourage PO hydration. Currently on ivfs--> will plan to dc if pt has adequate PO   -Cholecalciferol now dcd   -F/u pending PTHrp and vit d 1-25  -Would obtain ionized calcium if next blood draw planned in patient   -Outpt f/u. Pt does not have severe hypercalcemia. Gven pts age likely not a surgical candidate. Findings and thoughts discussed with patients daughter/ HCP Neda. 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    -Encourage PO hydration. Currently on ivfs--> will plan to dc if pt has adequate PO   -Cholecalciferol now dcd   -F/u pending PTHrp   -Would obtain ionized calcium if next blood draw planned in patient   -Outpt f/u. Pt does not have severe hypercalcemia. Gven pts age likely not a surgical candidate. Findings and thoughts discussed with patients daughter/ HCP Neda.

## 2024-09-25 NOTE — PROGRESS NOTE ADULT - PROBLEM SELECTOR PLAN 10
DVT ppx -HSQ held for BRBPR on 9/23. SCDs for now  Diet - No known issues with dysphagia as per patient and daughter ->Pt tolerating regular diet  Dispo: Pending placement. PT rec home PT. Will continue to discuss daily w/ SW/CM. PPD placed per SW/CM request for placement. PPD placed 9/23/24 at approximately 345 pm. PPD to be read on 9/25.      Plan d/w pts daughter/HCP Neda. DVT ppx -HSQ held for BRBPR on 9/23. SCDs for now  Diet - No known issues with dysphagia as per patient and daughter ->Pt tolerating regular diet  Dispo: Pending placement. PT rec home PT. Will continue to discuss daily w/ SW/CM. PPD placed per SW/CM request for placement. PPD placed 9/23/24 at approximately 345 pm. PPD to be read on 9/25.

## 2024-09-25 NOTE — BH CONSULTATION LIAISON PROGRESS NOTE - NSBHCHARTREVIEWVS_PSY_A_CORE FT
Vital Signs Last 24 Hrs  T(C): 36.6 (25 Sep 2024 11:41), Max: 36.9 (24 Sep 2024 19:48)  T(F): 97.8 (25 Sep 2024 11:41), Max: 98.5 (24 Sep 2024 19:48)  HR: 68 (25 Sep 2024 11:41) (66 - 79)  BP: 146/67 (25 Sep 2024 11:41) (146/55 - 160/71)  BP(mean): --  RR: 18 (25 Sep 2024 11:41) (18 - 18)  SpO2: 94% (25 Sep 2024 11:41) (94% - 97%)    Parameters below as of 25 Sep 2024 11:41  Patient On (Oxygen Delivery Method): room air

## 2024-09-25 NOTE — PROGRESS NOTE ADULT - PROBLEM SELECTOR PLAN 6
Pyuria on UA but denies UTI complaints  - urine culture cancelled by lab. No need to repeat at this time. Pt also on Levaquin for PNA. Will monitor clinically.

## 2024-09-25 NOTE — BH CONSULTATION LIAISON PROGRESS NOTE - ATTENDING COMMENTS
Chart reviewed. While I did not examine this patient in person, I spoke with Dr. Null about his encounter and agree with his history, MSE, A/P.

## 2024-09-25 NOTE — PROGRESS NOTE ADULT - PROBLEM SELECTOR PLAN 2
Worsening agitation x months as per daughter, patient with significant agitation and aggression against staff at her [facility], daughter states police also had to be called for her behavior in the past  Daughter states patient more paranoid and agitated x1 year and has been progressively worsening, she has accused another daughter of stealing her money and no longer speaks to her   Also was noted to have increased agitation on her first admission to [facility] earlier this month, was started on seroquel at that time but unclear if she continued to receive at the [facility];  she was using at [facility] as per daughter Neda  - appreciate psych recs   - continue with zoloft 50 mg and remeron 45 mg qhs; seroquel 12.5 q6 hr prn and zyprexa 1.25 mg q6 prn for agitation

## 2024-09-25 NOTE — BH CONSULTATION LIAISON PROGRESS NOTE - NSBHCONSULTFOLLOWAFTERCARE_PSY_A_CORE FT
Follow up with outpt psychiatry.    [facility]  [id number] [facility] [address] [phone]

## 2024-09-25 NOTE — BH CONSULTATION LIAISON PROGRESS NOTE - NS ED BHA REVIEW OF ED CHART VITAL SIGNS REVIEWED
Quality 226: Preventive Care And Screening: Tobacco Use: Screening And Cessation Intervention: Patient screened for tobacco use and is an ex/non-smoker
Detail Level: Detailed
Quality 130: Documentation Of Current Medications In The Medical Record: Current Medications Documented
Yes

## 2024-09-25 NOTE — BH CONSULTATION LIAISON PROGRESS NOTE - CURRENT MEDICATION
MEDICATIONS  (STANDING):  atorvastatin 10 milliGRAM(s) Oral at bedtime  latanoprost 0.005% Ophthalmic Solution 1 Drop(s) Both EYES at bedtime  levoFLOXacin IVPB 750 milliGRAM(s) IV Intermittent every 48 hours  levoFLOXacin IVPB      memantine 10 milliGRAM(s) Oral two times a day  mirtazapine Soltab 45 milliGRAM(s) Oral at bedtime  pantoprazole    Tablet 40 milliGRAM(s) Oral before breakfast  polyethylene glycol 3350 17 Gram(s) Oral two times a day  PPD  5 Tuberculin Unit(s) Injectable 5 Unit(s) IntraDermal once  senna 2 Tablet(s) Oral at bedtime  sertraline 50 milliGRAM(s) Oral daily  sodium chloride 0.9%. 1000 milliLiter(s) (50 mL/Hr) IV Continuous <Continuous>    MEDICATIONS  (PRN):  acetaminophen     Tablet .. 650 milliGRAM(s) Oral every 6 hours PRN Temp greater or equal to 38C (100.4F), Mild Pain (1 - 3)  aluminum hydroxide/magnesium hydroxide/simethicone Suspension 30 milliLiter(s) Oral every 4 hours PRN Dyspepsia  guaifenesin/dextromethorphan Oral Liquid 10 milliLiter(s) Oral every 4 hours PRN Cough  melatonin 3 milliGRAM(s) Oral at bedtime PRN Insomnia  OLANZapine Injectable 1.25 milliGRAM(s) IntraMuscular every 8 hours PRN agitation if unable to give quetiapine  ondansetron Injectable 4 milliGRAM(s) IV Push every 8 hours PRN Nausea and/or Vomiting  QUEtiapine 12.5 milliGRAM(s) Oral every 8 hours PRN agitation

## 2024-09-25 NOTE — PROGRESS NOTE ADULT - SUBJECTIVE AND OBJECTIVE BOX
Patient is a 89y old  Female who presents with a chief complaint of Agitation at her living facility (24 Sep 2024 08:07)      SUBJECTIVE / OVERNIGHT EVENTS:    Review of Systems:     MEDICATIONS  (STANDING):  atorvastatin 10 milliGRAM(s) Oral at bedtime  latanoprost 0.005% Ophthalmic Solution 1 Drop(s) Both EYES at bedtime  levoFLOXacin IVPB      levoFLOXacin IVPB 750 milliGRAM(s) IV Intermittent every 48 hours  memantine 10 milliGRAM(s) Oral two times a day  mirtazapine Soltab 45 milliGRAM(s) Oral at bedtime  pantoprazole    Tablet 40 milliGRAM(s) Oral before breakfast  polyethylene glycol 3350 17 Gram(s) Oral two times a day  PPD  5 Tuberculin Unit(s) Injectable 5 Unit(s) IntraDermal once  senna 2 Tablet(s) Oral at bedtime  sertraline 50 milliGRAM(s) Oral daily  sodium chloride 0.9%. 1000 milliLiter(s) (50 mL/Hr) IV Continuous <Continuous>    MEDICATIONS  (PRN):  acetaminophen     Tablet .. 650 milliGRAM(s) Oral every 6 hours PRN Temp greater or equal to 38C (100.4F), Mild Pain (1 - 3)  aluminum hydroxide/magnesium hydroxide/simethicone Suspension 30 milliLiter(s) Oral every 4 hours PRN Dyspepsia  guaifenesin/dextromethorphan Oral Liquid 10 milliLiter(s) Oral every 4 hours PRN Cough  melatonin 3 milliGRAM(s) Oral at bedtime PRN Insomnia  OLANZapine Injectable 1.25 milliGRAM(s) IntraMuscular every 8 hours PRN agitation if unable to give quetiapine  ondansetron Injectable 4 milliGRAM(s) IV Push every 8 hours PRN Nausea and/or Vomiting  QUEtiapine 12.5 milliGRAM(s) Oral every 8 hours PRN agitation      PHYSICAL EXAM:    I&O's Summary    ICU Vital Signs Last 24 Hrs  T(C): 36.3 (25 Sep 2024 04:55), Max: 36.9 (24 Sep 2024 19:48)  T(F): 97.3 (25 Sep 2024 04:55), Max: 98.5 (24 Sep 2024 19:48)  HR: 66 (25 Sep 2024 04:55) (66 - 85)  BP: 146/55 (25 Sep 2024 04:55) (130/51 - 160/71)  BP(mean): --  ABP: --  ABP(mean): --  RR: 18 (25 Sep 2024 04:55) (17 - 18)  SpO2: 97% (25 Sep 2024 04:55) (94% - 97%)    O2 Parameters below as of 25 Sep 2024 04:55  Patient On (Oxygen Delivery Method): room air    CONSTITUTIONAL: NAD, well-developed  EYES: EOMI; conjunctiva and sclera clear  ENMT: Moist oral mucosa  RESPIRATORY: Normal respiratory effort; lungs are clear to auscultation bilaterally  CARDIOVASCULAR: Regular rate and rhythm, normal S1 and S2, no murmur; No lower extremity edema  ABDOMEN: Mild TTP of abd- that pt stated it was bc she needed to urinate   MUSCULOSKELETAL:   no clubbing or cyanosis of digits; no joint swelling or tenderness to palpation  PSYCH: A+O to person, place; affect appropriate. Poor short term memory -saw pt 3 times today and each time she did not remember who I was and what we discussed   NEUROLOGY: CN 2-12 are intact and symmetric; no gross sensory deficits   SKIN: No rashes; no palpable lesions  LABS:        LABS:  CAPILLARY BLOOD GLUCOSE                              11.7   8.30  )-----------( 213      ( 23 Sep 2024 18:48 )             35.7                     RADIOLOGY & ADDITIONAL TESTS:    Imaging Personally Reviewed:    Consultant(s) Notes Reviewed:      Care Discussed with Consultants/Other Providers: Patient is a 89y old  Female who presents with a chief complaint of Agitation at her living facility (24 Sep 2024 08:07)      SUBJECTIVE / OVERNIGHT EVENTS: No CP, SOB, n/v. No abd pain. Per nursing staff no further brbpr    Review of Systems:     MEDICATIONS  (STANDING):  atorvastatin 10 milliGRAM(s) Oral at bedtime  latanoprost 0.005% Ophthalmic Solution 1 Drop(s) Both EYES at bedtime  levoFLOXacin IVPB      levoFLOXacin IVPB 750 milliGRAM(s) IV Intermittent every 48 hours  memantine 10 milliGRAM(s) Oral two times a day  mirtazapine Soltab 45 milliGRAM(s) Oral at bedtime  pantoprazole    Tablet 40 milliGRAM(s) Oral before breakfast  polyethylene glycol 3350 17 Gram(s) Oral two times a day  PPD  5 Tuberculin Unit(s) Injectable 5 Unit(s) IntraDermal once  senna 2 Tablet(s) Oral at bedtime  sertraline 50 milliGRAM(s) Oral daily  sodium chloride 0.9%. 1000 milliLiter(s) (50 mL/Hr) IV Continuous <Continuous>    MEDICATIONS  (PRN):  acetaminophen     Tablet .. 650 milliGRAM(s) Oral every 6 hours PRN Temp greater or equal to 38C (100.4F), Mild Pain (1 - 3)  aluminum hydroxide/magnesium hydroxide/simethicone Suspension 30 milliLiter(s) Oral every 4 hours PRN Dyspepsia  guaifenesin/dextromethorphan Oral Liquid 10 milliLiter(s) Oral every 4 hours PRN Cough  melatonin 3 milliGRAM(s) Oral at bedtime PRN Insomnia  OLANZapine Injectable 1.25 milliGRAM(s) IntraMuscular every 8 hours PRN agitation if unable to give quetiapine  ondansetron Injectable 4 milliGRAM(s) IV Push every 8 hours PRN Nausea and/or Vomiting  QUEtiapine 12.5 milliGRAM(s) Oral every 8 hours PRN agitation      PHYSICAL EXAM:    I&O's Summary    ICU Vital Signs Last 24 Hrs  T(C): 36.3 (25 Sep 2024 04:55), Max: 36.9 (24 Sep 2024 19:48)  T(F): 97.3 (25 Sep 2024 04:55), Max: 98.5 (24 Sep 2024 19:48)  HR: 66 (25 Sep 2024 04:55) (66 - 85)  BP: 146/55 (25 Sep 2024 04:55) (130/51 - 160/71)  BP(mean): --  ABP: --  ABP(mean): --  RR: 18 (25 Sep 2024 04:55) (17 - 18)  SpO2: 97% (25 Sep 2024 04:55) (94% - 97%)    O2 Parameters below as of 25 Sep 2024 04:55  Patient On (Oxygen Delivery Method): room air    CONSTITUTIONAL: NAD,  EYES: EOMI; conjunctiva and sclera clear  ENMT: Moist oral mucosa  RESPIRATORY: Normal respiratory effort; lungs are clear to auscultation bilaterally  CARDIOVASCULAR: Regular rate and rhythm, normal S1 and S2, no murmur; No lower extremity edema  ABDOMEN: No TTP , ND  MUSCULOSKELETAL:; no joint swelling or tenderness to palpation  PSYCH:  Poor short term memory    NEUROLOGY: no gross sensory deficits   SKIN: No rashes; no palpable lesions  LABS:        LABS:  CAPILLARY BLOOD GLUCOSE                              11.7   8.30  )-----------( 213      ( 23 Sep 2024 18:48 )             35.7                     RADIOLOGY & ADDITIONAL TESTS:    Imaging Personally Reviewed:    Consultant(s) Notes Reviewed:      Care Discussed with Consultants/Other Providers:

## 2024-09-25 NOTE — BH CONSULTATION LIAISON PROGRESS NOTE - NSBHASSESSMENTFT_PSY_ALL_CORE
Pt is a 89yo female,  w/ 2 adult daughters, retired, domiciled at [facility], PPH depression on Zoloft/Remeron and remote h/o therapy (unclear if currently in psych tx, meds prescribed at NH?), no known psych admissions/suicidality/homicidality/substance/legal hx, PMH dementia on donepezil/memantine, GERD, HLD, carotid stenosis who presented to the [facility] ED on 9/19 from her NH for aggressive behavior. Pt now admitted for tx of possible PNA.  Psych consulted for worsening agitation.  9/20: A&Ox1, not agitated. Continue home meds  9/24: A&Ox4, calm, cooperative. No agitation. Continue home meds. Pt psychiatrically stable for discharge.    Recs:  - c/w home meds: zoloft 50mg, remeron 45mg qhs  - hold antipsychotics if qtc >500  - for mild agitation --- seroquel PO 12.5mg PO q6h PRN  - For severe agitation Zyprexa IM 1.25mg q6h PRN,  can give additional 1.25mg for refractory agitation  - Dispo: pt psychiatrically stable for discharge to living facility once medically cleared. No indication for inpt psychiatry

## 2024-09-25 NOTE — PROGRESS NOTE ADULT - PROBLEM SELECTOR PLAN 1
Yesterday 9/23 evening pt noted to have episode of BRBPR that was a small amount by ACP and nurse, Pt was hemodynamically stable at the time. HSQ was dcd and CBC obtained and was stable.     Could be in the setting of hemorrhoids. Spoke to pts daughter who states patient as a history of hemorrhoids. DDx for alternative etiologies considered and are broad    -Given CBC and pt stable, would monitor for now   -If worsening bleeding, hemodynamically stable or dropping hemoglobin will need to consider further w/u and GI eval  -continue to hold HSQ for now 9/23 evening pt noted to have episode of BRBPR that was a small amount by ACP and nurse, Pt was hemodynamically stable at the time. HSQ was dcd and CBC obtained and was stable.     Could be in the setting of hemorrhoids. Spoke to pts daughter who states patient as a history of hemorrhoids. DDx for alternative etiologies considered and are broad    -Given CBC and pt stable, would monitor for now   -If worsening bleeding, hemodynamically stable or dropping hemoglobin will need to consider further w/u and GI eval  -continue to hold HSQ for now

## 2024-09-26 ENCOUNTER — TRANSCRIPTION ENCOUNTER (OUTPATIENT)
Age: 89
End: 2024-09-26

## 2024-09-26 VITALS
TEMPERATURE: 98 F | HEART RATE: 71 BPM | OXYGEN SATURATION: 99 % | SYSTOLIC BLOOD PRESSURE: 147 MMHG | RESPIRATION RATE: 18 BRPM | DIASTOLIC BLOOD PRESSURE: 74 MMHG

## 2024-09-26 LAB
% ALBUMIN: 46.1 % — SIGNIFICANT CHANGE UP
% ALPHA 1: 4.1 % — SIGNIFICANT CHANGE UP
% ALPHA 2: 16.4 % — SIGNIFICANT CHANGE UP
% BETA: 17 % — SIGNIFICANT CHANGE UP
% GAMMA: 16.5 % — SIGNIFICANT CHANGE UP
ALBUMIN SERPL ELPH-MCNC: 2.95 G/DL — LOW (ref 3.3–4.4)
ALBUMIN/GLOB SERPL ELPH: 0.9 RATIO — SIGNIFICANT CHANGE UP
ALPHA1 GLOB SERPL ELPH-MCNC: 0.26 G/DL — SIGNIFICANT CHANGE UP (ref 0.1–0.3)
ALPHA2 GLOB SERPL ELPH-MCNC: 1 G/DL — SIGNIFICANT CHANGE UP (ref 0.6–1)
ANION GAP SERPL CALC-SCNC: 9 MMOL/L — SIGNIFICANT CHANGE UP (ref 7–14)
B-GLOBULIN SERPL ELPH-MCNC: 1.09 G/DL — SIGNIFICANT CHANGE UP (ref 0.6–1.1)
BUN SERPL-MCNC: 17 MG/DL — SIGNIFICANT CHANGE UP (ref 7–23)
CA-I BLD-SCNC: 1.52 MMOL/L — HIGH (ref 1.15–1.29)
CALCIUM SERPL-MCNC: 11 MG/DL — HIGH (ref 8.4–10.5)
CHLORIDE SERPL-SCNC: 109 MMOL/L — HIGH (ref 98–107)
CO2 SERPL-SCNC: 22 MMOL/L — SIGNIFICANT CHANGE UP (ref 22–31)
CREAT SERPL-MCNC: 1 MG/DL — SIGNIFICANT CHANGE UP (ref 0.5–1.3)
EGFR: 52 ML/MIN/1.73M2 — LOW
GAMMA GLOBULIN: 1.06 G/DL — SIGNIFICANT CHANGE UP (ref 0.7–1.7)
GLUCOSE SERPL-MCNC: 92 MG/DL — SIGNIFICANT CHANGE UP (ref 70–99)
HCT VFR BLD CALC: 33.6 % — LOW (ref 34.5–45)
HGB BLD-MCNC: 10.9 G/DL — LOW (ref 11.5–15.5)
MCHC RBC-ENTMCNC: 30.5 PG — SIGNIFICANT CHANGE UP (ref 27–34)
MCHC RBC-ENTMCNC: 32.4 GM/DL — SIGNIFICANT CHANGE UP (ref 32–36)
MCV RBC AUTO: 94.1 FL — SIGNIFICANT CHANGE UP (ref 80–100)
NRBC # BLD: 0 /100 WBCS — SIGNIFICANT CHANGE UP (ref 0–0)
NRBC # FLD: 0 K/UL — SIGNIFICANT CHANGE UP (ref 0–0)
PLATELET # BLD AUTO: 183 K/UL — SIGNIFICANT CHANGE UP (ref 150–400)
POTASSIUM SERPL-MCNC: 4.4 MMOL/L — SIGNIFICANT CHANGE UP (ref 3.5–5.3)
POTASSIUM SERPL-SCNC: 4.4 MMOL/L — SIGNIFICANT CHANGE UP (ref 3.5–5.3)
PROT PATTERN SERPL ELPH-IMP: SIGNIFICANT CHANGE UP
PROT SERPL-MCNC: 6.4 G/DL — SIGNIFICANT CHANGE UP (ref 6–8.3)
RBC # BLD: 3.57 M/UL — LOW (ref 3.8–5.2)
RBC # FLD: 13.5 % — SIGNIFICANT CHANGE UP (ref 10.3–14.5)
SODIUM SERPL-SCNC: 140 MMOL/L — SIGNIFICANT CHANGE UP (ref 135–145)
WBC # BLD: 7.69 K/UL — SIGNIFICANT CHANGE UP (ref 3.8–10.5)
WBC # FLD AUTO: 7.69 K/UL — SIGNIFICANT CHANGE UP (ref 3.8–10.5)

## 2024-09-26 PROCEDURE — 99239 HOSP IP/OBS DSCHRG MGMT >30: CPT

## 2024-09-26 RX ORDER — 5-HYDROXYTRYPTOPHAN (5-HTP) 100 MG
1 TABLET,DISINTEGRATING ORAL
Qty: 30 | Refills: 0
Start: 2024-09-26 | End: 2024-10-25

## 2024-09-26 RX ORDER — SERTRALINE HYDROCHLORIDE 100 MG/1
1 TABLET, FILM COATED ORAL
Qty: 30 | Refills: 0
Start: 2024-09-26 | End: 2024-10-25

## 2024-09-26 RX ORDER — PEDIATRIC MULTIVIT 61/D3/VIT K 1500-800
1 CAPSULE ORAL
Qty: 30 | Refills: 0
Start: 2024-09-26 | End: 2024-10-25

## 2024-09-26 RX ORDER — MAG HYDROX/ALUMINUM HYD/SIMETH 200-200-20
30 SUSPENSION, ORAL (FINAL DOSE FORM) ORAL
Qty: 2700 | Refills: 0
Start: 2024-09-26 | End: 2024-10-10

## 2024-09-26 RX ORDER — SENNOSIDES 8.6 MG
2 TABLET ORAL
Qty: 0 | Refills: 0 | DISCHARGE
Start: 2024-09-26

## 2024-09-26 RX ORDER — ACETAMINOPHEN 325 MG
2 TABLET ORAL
Qty: 120 | Refills: 0
Start: 2024-09-26 | End: 2024-10-10

## 2024-09-26 RX ORDER — MIRTAZAPINE 30 MG/1
1 TABLET, FILM COATED ORAL
Qty: 30 | Refills: 0
Start: 2024-09-26 | End: 2024-10-25

## 2024-09-26 RX ORDER — MEMANTINE 10 MG/1
1 TABLET ORAL
Qty: 60 | Refills: 0
Start: 2024-09-26 | End: 2024-10-25

## 2024-09-26 RX ORDER — LATANOPROST 50 UG/ML
1 SOLUTION OPHTHALMIC
Qty: 1 | Refills: 0
Start: 2024-09-26 | End: 2024-10-25

## 2024-09-26 RX ORDER — DOCUSATE SODIUM 100 MG
1 CAPSULE ORAL
Qty: 30 | Refills: 0
Start: 2024-09-26 | End: 2024-10-25

## 2024-09-26 RX ORDER — ATORVASTATIN CALCIUM 10 MG/1
1 TABLET, FILM COATED ORAL
Qty: 30 | Refills: 0
Start: 2024-09-26 | End: 2024-10-25

## 2024-09-26 RX ORDER — QUETIAPINE FUMARATE 50 MG/1
0.5 TABLET, FILM COATED ORAL
Qty: 45 | Refills: 0
Start: 2024-09-26 | End: 2024-10-25

## 2024-09-26 RX ORDER — AMLODIPINE BESYLATE 5 MG
1 TABLET ORAL
Qty: 30 | Refills: 0
Start: 2024-09-26 | End: 2024-10-25

## 2024-09-26 RX ORDER — SODIUM CHLORIDE 0.9 % (FLUSH) 0.9 %
3 SYRINGE (ML) INJECTION EVERY 8 HOURS
Refills: 0 | Status: DISCONTINUED | OUTPATIENT
Start: 2024-09-26 | End: 2024-09-26

## 2024-09-26 RX ORDER — PANTOPRAZOLE SODIUM 40 MG/1
1 TABLET, DELAYED RELEASE ORAL
Qty: 30 | Refills: 0
Start: 2024-09-26 | End: 2024-10-25

## 2024-09-26 RX ADMIN — SERTRALINE HYDROCHLORIDE 50 MILLIGRAM(S): 100 TABLET, FILM COATED ORAL at 11:35

## 2024-09-26 RX ADMIN — Medication 17 GRAM(S): at 05:45

## 2024-09-26 RX ADMIN — MEMANTINE 10 MILLIGRAM(S): 10 TABLET ORAL at 05:45

## 2024-09-26 RX ADMIN — PANTOPRAZOLE SODIUM 40 MILLIGRAM(S): 40 TABLET, DELAYED RELEASE ORAL at 05:46

## 2024-09-26 NOTE — PROGRESS NOTE ADULT - PROBLEM SELECTOR PLAN 2
Worsening agitation x months as per daughter, patient with significant agitation and aggression against staff at her [facility], daughter states police also had to be called for her behavior in the past  Daughter states patient more paranoid and agitated x1 year and has been progressively worsening, she has accused another daughter of stealing her money and no longer speaks to her   Also was noted to have increased agitation on her first admission to [facility] earlier this month, was started on seroquel at that time but unclear if she continued to receive at the [facility];  she was using at [facility] as per daughter Neda  - appreciate psych recs   - continue with zoloft 50 mg and remeron 45 mg qhs; seroquel 12.5 q6 hr prn and zyprexa 1.25 mg q6 prn for agitation

## 2024-09-26 NOTE — PROGRESS NOTE ADULT - ASSESSMENT
This is a 89F with history as above who is sent in from her care home for worsening agitation here found to have  PNA. Currently awaiting placement

## 2024-09-26 NOTE — PROGRESS NOTE ADULT - PROBLEM SELECTOR PLAN 3
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    -Encourage PO hydration. Currently on ivfs--> will plan to dc if pt has adequate PO   -Cholecalciferol now dcd   -F/u pending PTHrp   -Would obtain ionized calcium if next blood draw planned in patient   -Outpt f/u. Pt does not have severe hypercalcemia. Gven pts age likely not a surgical candidate. Findings and thoughts discussed with patients daughter/ HCP Neda.

## 2024-09-26 NOTE — PROGRESS NOTE ADULT - REASON FOR ADMISSION
Agitation at her living facility

## 2024-09-26 NOTE — DISCHARGE NOTE NURSING/CASE MANAGEMENT/SOCIAL WORK - PATIENT PORTAL LINK FT
You can access the FollowMyHealth Patient Portal offered by [facility] by registering at the following website: [email or web address]. By joining [facility]’s FollowMyHealth portal, you will also be able to view your health information using other applications (apps) compatible with our system.

## 2024-09-26 NOTE — DISCHARGE NOTE NURSING/CASE MANAGEMENT/SOCIAL WORK - NSDCVIVACCINE_GEN_ALL_CORE_FT
Influenza, high-dose, trivalent, preservative free; 14-Sep-2024 11:14; Clive Kapoor (RN); [employer]; [id number] (Exp. Date: 06-Jun-2026); IntraMuscular; Deltoid Left.; 0.5 milliLiter(s); VIS (VIS Published: 06-Aug-2021, VIS Presented: 14-Sep-2024);   Tdap; 11-Jan-2022 19:55; Evelia Oreilly (RN); [employer]; [id number] (Exp. Date: 09-Sep-2023); IntraMuscular; Deltoid Right.; 0.5 milliLiter(s); VIS (VIS Published: 09-May-2013, VIS Presented: 11-Jan-2022);

## 2024-09-26 NOTE — PROGRESS NOTE ADULT - PROBLEM SELECTOR PLAN 4
Improved. Suspect In the setting of PNA  CXR with persistent "Right upper lobe opacities and left basilar haziness"  CT chest reviewed - stable interstitial lung disease  BCx x2 - neg to date      - For now treating for possible PNA, correction paper work states patient with allergy to keflex but unclear on what the allergy is, has been on ciprofloxacin before so pt placed on levofloxacin (750mg q48 based on her renal clearance) with plan for total 7 days of antibiotics. Last day of abx 9/25 (first dose received on 9/20).   - CT chest w/ stable interstitial lung disease - would benefit from outpt followup   - Incentive spirometry and chest PT also

## 2024-09-26 NOTE — PROGRESS NOTE ADULT - PROBLEM SELECTOR PLAN 1
9/23 evening pt noted to have episode of BRBPR that was a small amount by ACP and nurse, Pt was hemodynamically stable at the time. HSQ was dcd and CBC obtained and was stable.     Could be in the setting of hemorrhoids. Spoke to pts daughter who states patient as a history of hemorrhoids. DDx for alternative etiologies considered and are broad    -Given CBC and pt stable, would monitor for now   -If worsening bleeding, hemodynamically stable or dropping hemoglobin will need to consider further w/u and GI eval  -continue to hold HSQ for now

## 2024-09-26 NOTE — PROGRESS NOTE ADULT - PROBLEM SELECTOR PLAN 10
DVT ppx -HSQ held for BRBPR on 9/23. SCDs for now  Diet - No known issues with dysphagia as per patient and daughter ->Pt tolerating regular diet  Dispo: Pending placement. PT rec home PT. Will continue to discuss daily w/ SW/CM. PPD placed per SW/CM request for placement. PPD placed 9/23/24 at approximately 345 pm. PPD to be read on 9/25.

## 2024-09-26 NOTE — PROGRESS NOTE ADULT - SUBJECTIVE AND OBJECTIVE BOX
Patient is a 89y old  Female who presents with a chief complaint of Agitation at her living facility (25 Sep 2024 08:45)      SUBJECTIVE / OVERNIGHT EVENTS:    Review of Systems:     MEDICATIONS  (STANDING):  atorvastatin 10 milliGRAM(s) Oral at bedtime  latanoprost 0.005% Ophthalmic Solution 1 Drop(s) Both EYES at bedtime  memantine 10 milliGRAM(s) Oral two times a day  mirtazapine Soltab 45 milliGRAM(s) Oral at bedtime  pantoprazole    Tablet 40 milliGRAM(s) Oral before breakfast  polyethylene glycol 3350 17 Gram(s) Oral two times a day  PPD  5 Tuberculin Unit(s) Injectable 5 Unit(s) IntraDermal once  senna 2 Tablet(s) Oral at bedtime  sertraline 50 milliGRAM(s) Oral daily  sodium chloride 0.9%. 1000 milliLiter(s) (50 mL/Hr) IV Continuous <Continuous>    MEDICATIONS  (PRN):  acetaminophen     Tablet .. 650 milliGRAM(s) Oral every 6 hours PRN Temp greater or equal to 38C (100.4F), Mild Pain (1 - 3)  aluminum hydroxide/magnesium hydroxide/simethicone Suspension 30 milliLiter(s) Oral every 4 hours PRN Dyspepsia  guaifenesin/dextromethorphan Oral Liquid 10 milliLiter(s) Oral every 4 hours PRN Cough  melatonin 3 milliGRAM(s) Oral at bedtime PRN Insomnia  OLANZapine Injectable 1.25 milliGRAM(s) IntraMuscular every 8 hours PRN agitation if unable to give quetiapine  ondansetron Injectable 4 milliGRAM(s) IV Push every 8 hours PRN Nausea and/or Vomiting  QUEtiapine 12.5 milliGRAM(s) Oral every 8 hours PRN agitation      PHYSICAL EXAM:    I&O's Summary    ICU Vital Signs Last 24 Hrs  T(C): 36.5 (26 Sep 2024 04:50), Max: 36.7 (25 Sep 2024 22:29)  T(F): 97.7 (26 Sep 2024 04:50), Max: 98.1 (25 Sep 2024 22:29)  HR: 71 (26 Sep 2024 04:50) (68 - 73)  BP: 147/74 (26 Sep 2024 04:50) (146/67 - 164/56)  BP(mean): --  ABP: --  ABP(mean): --  RR: 18 (26 Sep 2024 04:50) (18 - 18)  SpO2: 99% (26 Sep 2024 04:50) (94% - 99%)    O2 Parameters below as of 26 Sep 2024 04:50  Patient On (Oxygen Delivery Method): nasal cannula  O2 Flow (L/min): 2    CONSTITUTIONAL: NAD,  EYES: EOMI; conjunctiva and sclera clear  ENMT: Moist oral mucosa  RESPIRATORY: Normal respiratory effort; lungs are clear to auscultation bilaterally  CARDIOVASCULAR: Regular rate and rhythm, normal S1 and S2, no murmur; No lower extremity edema  ABDOMEN: No TTP , ND  MUSCULOSKELETAL:; no joint swelling or tenderness to palpation  PSYCH:  Poor short term memory    NEUROLOGY: no gross sensory deficits   SKIN: No rashes; no palpable lesions      LABS:  CAPILLARY BLOOD GLUCOSE                              10.9   7.69  )-----------( 183      ( 26 Sep 2024 05:40 )             33.6     09-26    140  |  109[H]  |  17  ----------------------------<  92  4.4   |  22  |  1.00    Ca    11.0[H]      26 Sep 2024 05:40            Urinalysis Basic - ( 26 Sep 2024 05:40 )    Color: x / Appearance: x / SG: x / pH: x  Gluc: 92 mg/dL / Ketone: x  / Bili: x / Urobili: x   Blood: x / Protein: x / Nitrite: x   Leuk Esterase: x / RBC: x / WBC x   Sq Epi: x / Non Sq Epi: x / Bacteria: x        RADIOLOGY & ADDITIONAL TESTS:    Imaging Personally Reviewed:    Consultant(s) Notes Reviewed:      Care Discussed with Consultants/Other Providers:

## 2024-09-29 PROCEDURE — 85014 HEMATOCRIT: CPT

## 2024-09-29 PROCEDURE — 70450 CT HEAD/BRAIN W/O DYE: CPT | Mod: MC

## 2024-09-29 PROCEDURE — 84484 ASSAY OF TROPONIN QUANT: CPT

## 2024-09-29 PROCEDURE — 87086 URINE CULTURE/COLONY COUNT: CPT

## 2024-09-29 PROCEDURE — 80053 COMPREHEN METABOLIC PANEL: CPT

## 2024-09-29 PROCEDURE — 93005 ELECTROCARDIOGRAM TRACING: CPT

## 2024-09-29 PROCEDURE — 86140 C-REACTIVE PROTEIN: CPT

## 2024-09-29 PROCEDURE — 70498 CT ANGIOGRAPHY NECK: CPT | Mod: MC

## 2024-09-29 PROCEDURE — 84100 ASSAY OF PHOSPHORUS: CPT

## 2024-09-29 PROCEDURE — 97110 THERAPEUTIC EXERCISES: CPT

## 2024-09-29 PROCEDURE — 85025 COMPLETE CBC W/AUTO DIFF WBC: CPT

## 2024-09-29 PROCEDURE — 81001 URINALYSIS AUTO W/SCOPE: CPT

## 2024-09-29 PROCEDURE — 82435 ASSAY OF BLOOD CHLORIDE: CPT

## 2024-09-29 PROCEDURE — 71045 X-RAY EXAM CHEST 1 VIEW: CPT

## 2024-09-29 PROCEDURE — 82803 BLOOD GASES ANY COMBINATION: CPT

## 2024-09-29 PROCEDURE — 99285 EMERGENCY DEPT VISIT HI MDM: CPT

## 2024-09-29 PROCEDURE — 85652 RBC SED RATE AUTOMATED: CPT

## 2024-09-29 PROCEDURE — 80048 BASIC METABOLIC PNL TOTAL CA: CPT

## 2024-09-29 PROCEDURE — 84295 ASSAY OF SERUM SODIUM: CPT

## 2024-09-29 PROCEDURE — 87637 SARSCOV2&INF A&B&RSV AMP PRB: CPT

## 2024-09-29 PROCEDURE — 96375 TX/PRO/DX INJ NEW DRUG ADDON: CPT

## 2024-09-29 PROCEDURE — 74018 RADEX ABDOMEN 1 VIEW: CPT

## 2024-09-29 PROCEDURE — 85027 COMPLETE CBC AUTOMATED: CPT

## 2024-09-29 PROCEDURE — 82330 ASSAY OF CALCIUM: CPT

## 2024-09-29 PROCEDURE — 90662 IIV NO PRSV INCREASED AG IM: CPT

## 2024-09-29 PROCEDURE — 96376 TX/PRO/DX INJ SAME DRUG ADON: CPT

## 2024-09-29 PROCEDURE — 97161 PT EVAL LOW COMPLEX 20 MIN: CPT

## 2024-09-29 PROCEDURE — 96374 THER/PROPH/DIAG INJ IV PUSH: CPT

## 2024-09-29 PROCEDURE — 70496 CT ANGIOGRAPHY HEAD: CPT | Mod: MC

## 2024-09-29 PROCEDURE — 36415 COLL VENOUS BLD VENIPUNCTURE: CPT

## 2024-09-29 PROCEDURE — 84132 ASSAY OF SERUM POTASSIUM: CPT

## 2024-09-29 PROCEDURE — 97116 GAIT TRAINING THERAPY: CPT

## 2024-09-29 PROCEDURE — 82947 ASSAY GLUCOSE BLOOD QUANT: CPT

## 2024-09-29 PROCEDURE — 85610 PROTHROMBIN TIME: CPT

## 2024-09-29 PROCEDURE — 82962 GLUCOSE BLOOD TEST: CPT

## 2024-09-29 PROCEDURE — 83605 ASSAY OF LACTIC ACID: CPT

## 2024-09-29 PROCEDURE — 83735 ASSAY OF MAGNESIUM: CPT

## 2024-09-29 PROCEDURE — 85018 HEMOGLOBIN: CPT

## 2024-09-29 PROCEDURE — 85730 THROMBOPLASTIN TIME PARTIAL: CPT

## 2024-09-29 PROCEDURE — 74177 CT ABD & PELVIS W/CONTRAST: CPT | Mod: MC

## 2024-09-29 PROCEDURE — 72170 X-RAY EXAM OF PELVIS: CPT

## 2024-10-06 LAB — PTH RELATED PROT SERPL-MCNC: <2 PMOL/L — SIGNIFICANT CHANGE UP

## 2024-11-11 ENCOUNTER — APPOINTMENT (OUTPATIENT)
Dept: INTERNAL MEDICINE | Facility: CLINIC | Age: 89
End: 2024-11-11

## 2024-11-13 NOTE — PHYSICAL THERAPY INITIAL EVALUATION ADULT - HEALTH SCREEN CRITERIA
Caller: Dasha Pate    Relationship: Emergency Contact    Best call back number: [phone]    What is the best time to reach you: ANY    Who are you requesting to speak with (clinical staff, provider,  specific staff member): MIRANDA     What was the call regarding: MEDICAL NOTE FOR RETURN TO WORK      yes

## 2025-02-12 ENCOUNTER — EMERGENCY (EMERGENCY)
Facility: HOSPITAL | Age: 89
LOS: 1 days | Discharge: HOME | End: 2025-02-12
Attending: EMERGENCY MEDICINE | Admitting: EMERGENCY MEDICINE
Payer: MEDICARE

## 2025-02-12 VITALS
HEIGHT: 62 IN | HEART RATE: 73 BPM | TEMPERATURE: 98 F | DIASTOLIC BLOOD PRESSURE: 73 MMHG | RESPIRATION RATE: 19 BRPM | SYSTOLIC BLOOD PRESSURE: 170 MMHG | WEIGHT: 100.09 LBS | OXYGEN SATURATION: 99 %

## 2025-02-12 PROCEDURE — 72100 X-RAY EXAM L-S SPINE 2/3 VWS: CPT

## 2025-02-12 PROCEDURE — 70450 CT HEAD/BRAIN W/O DYE: CPT | Mod: 26

## 2025-02-12 PROCEDURE — 99284 EMERGENCY DEPT VISIT MOD MDM: CPT | Mod: 25

## 2025-02-12 PROCEDURE — 70450 CT HEAD/BRAIN W/O DYE: CPT | Mod: MC

## 2025-02-12 PROCEDURE — 93010 ELECTROCARDIOGRAM REPORT: CPT

## 2025-02-12 PROCEDURE — 72100 X-RAY EXAM L-S SPINE 2/3 VWS: CPT | Mod: 26

## 2025-02-12 PROCEDURE — 93005 ELECTROCARDIOGRAM TRACING: CPT

## 2025-02-12 PROCEDURE — 99285 EMERGENCY DEPT VISIT HI MDM: CPT

## 2025-02-12 RX ORDER — ACETAMINOPHEN 500 MG/5ML
975 LIQUID (ML) ORAL ONCE
Refills: 0 | Status: COMPLETED | OUTPATIENT
Start: 2025-02-12 | End: 2025-02-12

## 2025-02-12 RX ADMIN — Medication 975 MILLIGRAM(S): at 22:35

## 2025-02-13 VITALS
HEART RATE: 65 BPM | RESPIRATION RATE: 20 BRPM | DIASTOLIC BLOOD PRESSURE: 60 MMHG | SYSTOLIC BLOOD PRESSURE: 156 MMHG | OXYGEN SATURATION: 95 %

## 2025-08-13 ENCOUNTER — EMERGENCY (EMERGENCY)
Facility: HOSPITAL | Age: 89
LOS: 1 days | End: 2025-08-13
Attending: STUDENT IN AN ORGANIZED HEALTH CARE EDUCATION/TRAINING PROGRAM | Admitting: STUDENT IN AN ORGANIZED HEALTH CARE EDUCATION/TRAINING PROGRAM
Payer: MEDICARE

## 2025-08-13 ENCOUNTER — INPATIENT (INPATIENT)
Facility: HOSPITAL | Age: 89
LOS: 4 days | Discharge: NURSING, REHAB OR LONG-TERM CARE | DRG: 185 | End: 2025-08-18
Attending: INTERNAL MEDICINE | Admitting: STUDENT IN AN ORGANIZED HEALTH CARE EDUCATION/TRAINING PROGRAM
Payer: MEDICARE

## 2025-08-13 VITALS
SYSTOLIC BLOOD PRESSURE: 138 MMHG | RESPIRATION RATE: 20 BRPM | HEART RATE: 78 BPM | TEMPERATURE: 98 F | OXYGEN SATURATION: 96 % | DIASTOLIC BLOOD PRESSURE: 81 MMHG

## 2025-08-13 VITALS
OXYGEN SATURATION: 96 % | SYSTOLIC BLOOD PRESSURE: 153 MMHG | DIASTOLIC BLOOD PRESSURE: 74 MMHG | HEART RATE: 84 BPM | RESPIRATION RATE: 18 BRPM

## 2025-08-13 VITALS
SYSTOLIC BLOOD PRESSURE: 180 MMHG | WEIGHT: 134.92 LBS | HEART RATE: 71 BPM | RESPIRATION RATE: 18 BRPM | TEMPERATURE: 98 F | DIASTOLIC BLOOD PRESSURE: 73 MMHG | OXYGEN SATURATION: 92 %

## 2025-08-13 DIAGNOSIS — S22.49XA MULTIPLE FRACTURES OF RIBS, UNSPECIFIED SIDE, INITIAL ENCOUNTER FOR CLOSED FRACTURE: ICD-10-CM

## 2025-08-13 PROBLEM — I10 ESSENTIAL (PRIMARY) HYPERTENSION: Chronic | Status: ACTIVE | Noted: 2025-02-12

## 2025-08-13 LAB
ALBUMIN SERPL ELPH-MCNC: 3.6 G/DL — SIGNIFICANT CHANGE UP (ref 3.3–5)
ALP SERPL-CCNC: 123 U/L — HIGH (ref 40–120)
ALT FLD-CCNC: 29 U/L — SIGNIFICANT CHANGE UP (ref 10–45)
ANION GAP SERPL CALC-SCNC: 7 MMOL/L — SIGNIFICANT CHANGE UP (ref 5–17)
APPEARANCE UR: CLEAR — SIGNIFICANT CHANGE UP
APTT BLD: 25.2 SEC — LOW (ref 26.1–36.8)
AST SERPL-CCNC: 27 U/L — SIGNIFICANT CHANGE UP (ref 10–40)
BACTERIA # UR AUTO: ABNORMAL /HPF
BASOPHILS # BLD AUTO: 0.07 K/UL — SIGNIFICANT CHANGE UP (ref 0–0.2)
BASOPHILS NFR BLD AUTO: 0.6 % — SIGNIFICANT CHANGE UP (ref 0–2)
BILIRUB SERPL-MCNC: 0.4 MG/DL — SIGNIFICANT CHANGE UP (ref 0.2–1.2)
BILIRUB UR-MCNC: NEGATIVE — SIGNIFICANT CHANGE UP
BUN SERPL-MCNC: 22 MG/DL — SIGNIFICANT CHANGE UP (ref 7–23)
CALCIUM SERPL-MCNC: 10.5 MG/DL — SIGNIFICANT CHANGE UP (ref 8.4–10.5)
CHLORIDE SERPL-SCNC: 103 MMOL/L — SIGNIFICANT CHANGE UP (ref 96–108)
CO2 SERPL-SCNC: 29 MMOL/L — SIGNIFICANT CHANGE UP (ref 22–31)
COLOR SPEC: YELLOW — SIGNIFICANT CHANGE UP
CREAT SERPL-MCNC: 1.17 MG/DL — SIGNIFICANT CHANGE UP (ref 0.5–1.3)
DIFF PNL FLD: NEGATIVE — SIGNIFICANT CHANGE UP
EGFR: 42 ML/MIN/1.73M2 — LOW
EGFR: 42 ML/MIN/1.73M2 — LOW
EOSINOPHIL # BLD AUTO: 0.11 K/UL — SIGNIFICANT CHANGE UP (ref 0–0.5)
EOSINOPHIL NFR BLD AUTO: 0.9 % — SIGNIFICANT CHANGE UP (ref 0–6)
EPI CELLS # UR: SIGNIFICANT CHANGE UP
GLUCOSE SERPL-MCNC: 112 MG/DL — HIGH (ref 70–99)
GLUCOSE UR QL: NEGATIVE MG/DL — SIGNIFICANT CHANGE UP
HCT VFR BLD CALC: 42.1 % — SIGNIFICANT CHANGE UP (ref 34.5–45)
HGB BLD-MCNC: 13.7 G/DL — SIGNIFICANT CHANGE UP (ref 11.5–15.5)
IMM GRANULOCYTES NFR BLD AUTO: 1.3 % — HIGH (ref 0–0.9)
INR BLD: 0.92 RATIO — SIGNIFICANT CHANGE UP (ref 0.85–1.16)
KETONES UR QL: NEGATIVE MG/DL — SIGNIFICANT CHANGE UP
LEUKOCYTE ESTERASE UR-ACNC: ABNORMAL
LYMPHOCYTES # BLD AUTO: 1.82 K/UL — SIGNIFICANT CHANGE UP (ref 1–3.3)
LYMPHOCYTES # BLD AUTO: 15.6 % — SIGNIFICANT CHANGE UP (ref 13–44)
MAGNESIUM SERPL-MCNC: 2.3 MG/DL — SIGNIFICANT CHANGE UP (ref 1.6–2.6)
MCHC RBC-ENTMCNC: 30.6 PG — SIGNIFICANT CHANGE UP (ref 27–34)
MCHC RBC-ENTMCNC: 32.5 G/DL — SIGNIFICANT CHANGE UP (ref 32–36)
MCV RBC AUTO: 94.2 FL — SIGNIFICANT CHANGE UP (ref 80–100)
MONOCYTES # BLD AUTO: 1.01 K/UL — HIGH (ref 0–0.9)
MONOCYTES NFR BLD AUTO: 8.7 % — SIGNIFICANT CHANGE UP (ref 2–14)
NEUTROPHILS # BLD AUTO: 8.51 K/UL — HIGH (ref 1.8–7.4)
NEUTROPHILS NFR BLD AUTO: 72.9 % — SIGNIFICANT CHANGE UP (ref 43–77)
NITRITE UR-MCNC: NEGATIVE — SIGNIFICANT CHANGE UP
NRBC BLD AUTO-RTO: 0 /100 WBCS — SIGNIFICANT CHANGE UP (ref 0–0)
PH UR: 7 — SIGNIFICANT CHANGE UP (ref 5–8)
PLATELET # BLD AUTO: 211 K/UL — SIGNIFICANT CHANGE UP (ref 150–400)
POTASSIUM SERPL-MCNC: 4.1 MMOL/L — SIGNIFICANT CHANGE UP (ref 3.5–5.3)
POTASSIUM SERPL-SCNC: 4.1 MMOL/L — SIGNIFICANT CHANGE UP (ref 3.5–5.3)
PROT SERPL-MCNC: 8.1 G/DL — SIGNIFICANT CHANGE UP (ref 6–8.3)
PROT UR-MCNC: NEGATIVE MG/DL — SIGNIFICANT CHANGE UP
PROTHROM AB SERPL-ACNC: 10.9 SEC — SIGNIFICANT CHANGE UP (ref 9.9–13.4)
RBC # BLD: 4.47 M/UL — SIGNIFICANT CHANGE UP (ref 3.8–5.2)
RBC # FLD: 14.6 % — HIGH (ref 10.3–14.5)
RBC CASTS # UR COMP ASSIST: 0 /HPF — SIGNIFICANT CHANGE UP (ref 0–4)
SODIUM SERPL-SCNC: 139 MMOL/L — SIGNIFICANT CHANGE UP (ref 135–145)
SP GR SPEC: 1.01 — SIGNIFICANT CHANGE UP (ref 1–1.03)
TROPONIN I, HIGH SENSITIVITY RESULT: 14.5 NG/L — SIGNIFICANT CHANGE UP
UROBILINOGEN FLD QL: 0.2 MG/DL — SIGNIFICANT CHANGE UP (ref 0.2–1)
WBC # BLD: 11.67 K/UL — HIGH (ref 3.8–10.5)
WBC # FLD AUTO: 11.67 K/UL — HIGH (ref 3.8–10.5)
WBC UR QL: 6 /HPF — HIGH (ref 0–5)

## 2025-08-13 PROCEDURE — 73590 X-RAY EXAM OF LOWER LEG: CPT

## 2025-08-13 PROCEDURE — 36415 COLL VENOUS BLD VENIPUNCTURE: CPT

## 2025-08-13 PROCEDURE — 84484 ASSAY OF TROPONIN QUANT: CPT

## 2025-08-13 PROCEDURE — 74176 CT ABD & PELVIS W/O CONTRAST: CPT

## 2025-08-13 PROCEDURE — 85025 COMPLETE CBC W/AUTO DIFF WBC: CPT

## 2025-08-13 PROCEDURE — 99222 1ST HOSP IP/OBS MODERATE 55: CPT

## 2025-08-13 PROCEDURE — 72125 CT NECK SPINE W/O DYE: CPT

## 2025-08-13 PROCEDURE — 70450 CT HEAD/BRAIN W/O DYE: CPT

## 2025-08-13 PROCEDURE — 85610 PROTHROMBIN TIME: CPT

## 2025-08-13 PROCEDURE — 99291 CRITICAL CARE FIRST HOUR: CPT | Mod: 25

## 2025-08-13 PROCEDURE — 96374 THER/PROPH/DIAG INJ IV PUSH: CPT

## 2025-08-13 PROCEDURE — 71250 CT THORAX DX C-: CPT

## 2025-08-13 PROCEDURE — 71250 CT THORAX DX C-: CPT | Mod: 26

## 2025-08-13 PROCEDURE — 87086 URINE CULTURE/COLONY COUNT: CPT

## 2025-08-13 PROCEDURE — 80053 COMPREHEN METABOLIC PANEL: CPT

## 2025-08-13 PROCEDURE — 99285 EMERGENCY DEPT VISIT HI MDM: CPT | Mod: FS

## 2025-08-13 PROCEDURE — 70450 CT HEAD/BRAIN W/O DYE: CPT | Mod: 26

## 2025-08-13 PROCEDURE — 96372 THER/PROPH/DIAG INJ SC/IM: CPT | Mod: XU

## 2025-08-13 PROCEDURE — 73590 X-RAY EXAM OF LOWER LEG: CPT | Mod: 26,RT

## 2025-08-13 PROCEDURE — 83735 ASSAY OF MAGNESIUM: CPT

## 2025-08-13 PROCEDURE — 72125 CT NECK SPINE W/O DYE: CPT | Mod: 26

## 2025-08-13 PROCEDURE — 93005 ELECTROCARDIOGRAM TRACING: CPT

## 2025-08-13 PROCEDURE — 99291 CRITICAL CARE FIRST HOUR: CPT

## 2025-08-13 PROCEDURE — 85730 THROMBOPLASTIN TIME PARTIAL: CPT

## 2025-08-13 PROCEDURE — 81001 URINALYSIS AUTO W/SCOPE: CPT

## 2025-08-13 PROCEDURE — 74176 CT ABD & PELVIS W/O CONTRAST: CPT | Mod: 26

## 2025-08-13 PROCEDURE — 93010 ELECTROCARDIOGRAM REPORT: CPT

## 2025-08-13 PROCEDURE — 96375 TX/PRO/DX INJ NEW DRUG ADDON: CPT

## 2025-08-13 RX ORDER — LIDOCAINE HYDROCHLORIDE 20 MG/ML
1 JELLY TOPICAL ONCE
Refills: 0 | Status: COMPLETED | OUTPATIENT
Start: 2025-08-13 | End: 2025-08-13

## 2025-08-13 RX ORDER — OLANZAPINE 10 MG/1
5 TABLET ORAL ONCE
Refills: 0 | Status: COMPLETED | OUTPATIENT
Start: 2025-08-13 | End: 2025-08-13

## 2025-08-13 RX ORDER — LIDOCAINE HYDROCHLORIDE 20 MG/ML
1 JELLY TOPICAL EVERY 24 HOURS
Refills: 0 | Status: DISCONTINUED | OUTPATIENT
Start: 2025-08-13 | End: 2025-08-18

## 2025-08-13 RX ORDER — ACETAMINOPHEN 500 MG/5ML
1000 LIQUID (ML) ORAL ONCE
Refills: 0 | Status: COMPLETED | OUTPATIENT
Start: 2025-08-13 | End: 2025-08-13

## 2025-08-13 RX ORDER — ACETAMINOPHEN 500 MG/5ML
1000 LIQUID (ML) ORAL EVERY 6 HOURS
Refills: 0 | Status: DISCONTINUED | OUTPATIENT
Start: 2025-08-13 | End: 2025-08-18

## 2025-08-13 RX ORDER — MIDAZOLAM IN 0.9 % SOD.CHLORID 1 MG/ML
1 PLASTIC BAG, INJECTION (ML) INTRAVENOUS ONCE
Refills: 0 | Status: DISCONTINUED | OUTPATIENT
Start: 2025-08-13 | End: 2025-08-13

## 2025-08-13 RX ADMIN — Medication 400 MILLIGRAM(S): at 15:56

## 2025-08-13 RX ADMIN — OLANZAPINE 5 MILLIGRAM(S): 10 TABLET ORAL at 17:30

## 2025-08-13 RX ADMIN — Medication 1 MILLIGRAM(S): at 17:30

## 2025-08-13 RX ADMIN — Medication 500 MILLILITER(S): at 15:56

## 2025-08-13 RX ADMIN — OLANZAPINE 5 MILLIGRAM(S): 10 TABLET ORAL at 21:22

## 2025-08-13 RX ADMIN — LIDOCAINE HYDROCHLORIDE 1 PATCH: 20 JELLY TOPICAL at 15:56

## 2025-08-14 LAB
ALBUMIN SERPL ELPH-MCNC: 3.5 G/DL — SIGNIFICANT CHANGE UP (ref 3.3–5)
ALP SERPL-CCNC: 111 U/L — SIGNIFICANT CHANGE UP (ref 40–120)
ALT FLD-CCNC: 14 U/L — SIGNIFICANT CHANGE UP (ref 10–45)
ANION GAP SERPL CALC-SCNC: 10 MMOL/L — SIGNIFICANT CHANGE UP (ref 5–17)
AST SERPL-CCNC: 21 U/L — SIGNIFICANT CHANGE UP (ref 10–40)
BILIRUB SERPL-MCNC: 0.4 MG/DL — SIGNIFICANT CHANGE UP (ref 0.2–1.2)
BUN SERPL-MCNC: 17 MG/DL — SIGNIFICANT CHANGE UP (ref 7–23)
CALCIUM SERPL-MCNC: 10.7 MG/DL — HIGH (ref 8.4–10.5)
CHLORIDE SERPL-SCNC: 108 MMOL/L — SIGNIFICANT CHANGE UP (ref 96–108)
CO2 SERPL-SCNC: 23 MMOL/L — SIGNIFICANT CHANGE UP (ref 22–31)
CREAT SERPL-MCNC: 0.81 MG/DL — SIGNIFICANT CHANGE UP (ref 0.5–1.3)
EGFR: 66 ML/MIN/1.73M2 — SIGNIFICANT CHANGE UP
EGFR: 66 ML/MIN/1.73M2 — SIGNIFICANT CHANGE UP
GLUCOSE SERPL-MCNC: 92 MG/DL — SIGNIFICANT CHANGE UP (ref 70–99)
HCT VFR BLD CALC: 41.2 % — SIGNIFICANT CHANGE UP (ref 34.5–45)
HGB BLD-MCNC: 12.7 G/DL — SIGNIFICANT CHANGE UP (ref 11.5–15.5)
MAGNESIUM SERPL-MCNC: 2.3 MG/DL — SIGNIFICANT CHANGE UP (ref 1.6–2.6)
MCHC RBC-ENTMCNC: 29.7 PG — SIGNIFICANT CHANGE UP (ref 27–34)
MCHC RBC-ENTMCNC: 30.8 G/DL — LOW (ref 32–36)
MCV RBC AUTO: 96.3 FL — SIGNIFICANT CHANGE UP (ref 80–100)
NRBC # BLD AUTO: 0 K/UL — SIGNIFICANT CHANGE UP (ref 0–0)
NRBC # FLD: 0 K/UL — SIGNIFICANT CHANGE UP (ref 0–0)
NRBC BLD AUTO-RTO: 0 /100 WBCS — SIGNIFICANT CHANGE UP (ref 0–0)
PHOSPHATE SERPL-MCNC: 2.4 MG/DL — LOW (ref 2.5–4.5)
PLATELET # BLD AUTO: 169 K/UL — SIGNIFICANT CHANGE UP (ref 150–400)
PMV BLD: 12 FL — SIGNIFICANT CHANGE UP (ref 7–13)
POTASSIUM SERPL-MCNC: 4.2 MMOL/L — SIGNIFICANT CHANGE UP (ref 3.5–5.3)
POTASSIUM SERPL-SCNC: 4.2 MMOL/L — SIGNIFICANT CHANGE UP (ref 3.5–5.3)
PROT SERPL-MCNC: 6.9 G/DL — SIGNIFICANT CHANGE UP (ref 6–8.3)
RBC # BLD: 4.28 M/UL — SIGNIFICANT CHANGE UP (ref 3.8–5.2)
RBC # FLD: 14.7 % — HIGH (ref 10.3–14.5)
SODIUM SERPL-SCNC: 141 MMOL/L — SIGNIFICANT CHANGE UP (ref 135–145)
WBC # BLD: 9.37 K/UL — SIGNIFICANT CHANGE UP (ref 3.8–10.5)
WBC # FLD AUTO: 9.37 K/UL — SIGNIFICANT CHANGE UP (ref 3.8–10.5)

## 2025-08-14 PROCEDURE — 85027 COMPLETE CBC AUTOMATED: CPT

## 2025-08-14 PROCEDURE — 80053 COMPREHEN METABOLIC PANEL: CPT

## 2025-08-14 PROCEDURE — 83735 ASSAY OF MAGNESIUM: CPT

## 2025-08-14 PROCEDURE — 73590 X-RAY EXAM OF LOWER LEG: CPT

## 2025-08-14 PROCEDURE — 71045 X-RAY EXAM CHEST 1 VIEW: CPT

## 2025-08-14 PROCEDURE — 71045 X-RAY EXAM CHEST 1 VIEW: CPT | Mod: 26

## 2025-08-14 PROCEDURE — 97161 PT EVAL LOW COMPLEX 20 MIN: CPT

## 2025-08-14 PROCEDURE — 99232 SBSQ HOSP IP/OBS MODERATE 35: CPT

## 2025-08-14 PROCEDURE — 84100 ASSAY OF PHOSPHORUS: CPT

## 2025-08-14 RX ORDER — SODIUM PHOSPHATE,DIBASIC DIHYD
30 POWDER (GRAM) MISCELLANEOUS ONCE
Refills: 0 | Status: COMPLETED | OUTPATIENT
Start: 2025-08-14 | End: 2025-08-14

## 2025-08-14 RX ADMIN — Medication 400 MILLIGRAM(S): at 05:12

## 2025-08-14 RX ADMIN — LIDOCAINE HYDROCHLORIDE 1 PATCH: 20 JELLY TOPICAL at 07:00

## 2025-08-14 RX ADMIN — Medication 400 MILLIGRAM(S): at 00:07

## 2025-08-14 RX ADMIN — LIDOCAINE HYDROCHLORIDE 1 PATCH: 20 JELLY TOPICAL at 00:07

## 2025-08-14 RX ADMIN — Medication 400 MILLIGRAM(S): at 11:56

## 2025-08-14 RX ADMIN — LIDOCAINE HYDROCHLORIDE 1 PATCH: 20 JELLY TOPICAL at 12:00

## 2025-08-14 RX ADMIN — Medication 85 MILLIMOLE(S): at 09:19

## 2025-08-15 LAB
ANION GAP SERPL CALC-SCNC: 10 MMOL/L — SIGNIFICANT CHANGE UP (ref 5–17)
BUN SERPL-MCNC: 18 MG/DL — SIGNIFICANT CHANGE UP (ref 7–23)
CALCIUM SERPL-MCNC: 10.4 MG/DL — SIGNIFICANT CHANGE UP (ref 8.4–10.5)
CHLORIDE SERPL-SCNC: 107 MMOL/L — SIGNIFICANT CHANGE UP (ref 96–108)
CO2 SERPL-SCNC: 24 MMOL/L — SIGNIFICANT CHANGE UP (ref 22–31)
CREAT SERPL-MCNC: 0.94 MG/DL — SIGNIFICANT CHANGE UP (ref 0.5–1.3)
CULTURE RESULTS: SIGNIFICANT CHANGE UP
EGFR: 55 ML/MIN/1.73M2 — LOW
EGFR: 55 ML/MIN/1.73M2 — LOW
GLUCOSE SERPL-MCNC: 85 MG/DL — SIGNIFICANT CHANGE UP (ref 70–99)
HCT VFR BLD CALC: 37.3 % — SIGNIFICANT CHANGE UP (ref 34.5–45)
HGB BLD-MCNC: 11.6 G/DL — SIGNIFICANT CHANGE UP (ref 11.5–15.5)
MAGNESIUM SERPL-MCNC: 2.2 MG/DL — SIGNIFICANT CHANGE UP (ref 1.6–2.6)
MCHC RBC-ENTMCNC: 29.7 PG — SIGNIFICANT CHANGE UP (ref 27–34)
MCHC RBC-ENTMCNC: 31.1 G/DL — LOW (ref 32–36)
MCV RBC AUTO: 95.6 FL — SIGNIFICANT CHANGE UP (ref 80–100)
NRBC # BLD AUTO: 0 K/UL — SIGNIFICANT CHANGE UP (ref 0–0)
NRBC # FLD: 0 K/UL — SIGNIFICANT CHANGE UP (ref 0–0)
NRBC BLD AUTO-RTO: 0 /100 WBCS — SIGNIFICANT CHANGE UP (ref 0–0)
PHOSPHATE SERPL-MCNC: 2.7 MG/DL — SIGNIFICANT CHANGE UP (ref 2.5–4.5)
PLATELET # BLD AUTO: 186 K/UL — SIGNIFICANT CHANGE UP (ref 150–400)
PMV BLD: 12.5 FL — SIGNIFICANT CHANGE UP (ref 7–13)
POTASSIUM SERPL-MCNC: 4.5 MMOL/L — SIGNIFICANT CHANGE UP (ref 3.5–5.3)
POTASSIUM SERPL-SCNC: 4.5 MMOL/L — SIGNIFICANT CHANGE UP (ref 3.5–5.3)
RBC # BLD: 3.9 M/UL — SIGNIFICANT CHANGE UP (ref 3.8–5.2)
RBC # FLD: 14.7 % — HIGH (ref 10.3–14.5)
SODIUM SERPL-SCNC: 141 MMOL/L — SIGNIFICANT CHANGE UP (ref 135–145)
SPECIMEN SOURCE: SIGNIFICANT CHANGE UP
WBC # BLD: 9.64 K/UL — SIGNIFICANT CHANGE UP (ref 3.8–10.5)
WBC # FLD AUTO: 9.64 K/UL — SIGNIFICANT CHANGE UP (ref 3.8–10.5)

## 2025-08-15 PROCEDURE — 85027 COMPLETE CBC AUTOMATED: CPT

## 2025-08-15 PROCEDURE — 84100 ASSAY OF PHOSPHORUS: CPT

## 2025-08-15 PROCEDURE — 73590 X-RAY EXAM OF LOWER LEG: CPT

## 2025-08-15 PROCEDURE — 80048 BASIC METABOLIC PNL TOTAL CA: CPT

## 2025-08-15 PROCEDURE — 94640 AIRWAY INHALATION TREATMENT: CPT

## 2025-08-15 PROCEDURE — 80053 COMPREHEN METABOLIC PANEL: CPT

## 2025-08-15 PROCEDURE — 83735 ASSAY OF MAGNESIUM: CPT

## 2025-08-15 PROCEDURE — 71045 X-RAY EXAM CHEST 1 VIEW: CPT

## 2025-08-15 PROCEDURE — 97161 PT EVAL LOW COMPLEX 20 MIN: CPT

## 2025-08-15 PROCEDURE — 71045 X-RAY EXAM CHEST 1 VIEW: CPT | Mod: 26

## 2025-08-15 RX ORDER — ACETAMINOPHEN 500 MG/5ML
650 LIQUID (ML) ORAL EVERY 6 HOURS
Refills: 0 | Status: DISCONTINUED | OUTPATIENT
Start: 2025-08-15 | End: 2025-08-18

## 2025-08-15 RX ORDER — AMLODIPINE BESYLATE 10 MG/1
10 TABLET ORAL DAILY
Refills: 0 | Status: DISCONTINUED | OUTPATIENT
Start: 2025-08-15 | End: 2025-08-18

## 2025-08-15 RX ORDER — HEPARIN SODIUM 1000 [USP'U]/ML
5000 INJECTION INTRAVENOUS; SUBCUTANEOUS EVERY 12 HOURS
Refills: 0 | Status: DISCONTINUED | OUTPATIENT
Start: 2025-08-15 | End: 2025-08-18

## 2025-08-15 RX ORDER — IPRATROPIUM BROMIDE AND ALBUTEROL SULFATE .5; 2.5 MG/3ML; MG/3ML
3 SOLUTION RESPIRATORY (INHALATION) ONCE
Refills: 0 | Status: COMPLETED | OUTPATIENT
Start: 2025-08-15 | End: 2025-08-15

## 2025-08-15 RX ORDER — MEMANTINE HYDROCHLORIDE 21 MG/1
10 CAPSULE, EXTENDED RELEASE ORAL EVERY 12 HOURS
Refills: 0 | Status: DISCONTINUED | OUTPATIENT
Start: 2025-08-15 | End: 2025-08-18

## 2025-08-15 RX ORDER — MIRTAZAPINE 30 MG/1
1 TABLET, FILM COATED ORAL
Refills: 0 | DISCHARGE

## 2025-08-15 RX ORDER — ACETAMINOPHEN 500 MG/5ML
1000 LIQUID (ML) ORAL ONCE
Refills: 0 | Status: COMPLETED | OUTPATIENT
Start: 2025-08-15 | End: 2025-08-15

## 2025-08-15 RX ADMIN — LIDOCAINE HYDROCHLORIDE 1 PATCH: 20 JELLY TOPICAL at 12:34

## 2025-08-15 RX ADMIN — Medication 650 MILLIGRAM(S): at 20:24

## 2025-08-15 RX ADMIN — Medication 650 MILLIGRAM(S): at 19:24

## 2025-08-15 RX ADMIN — LIDOCAINE HYDROCHLORIDE 1 PATCH: 20 JELLY TOPICAL at 00:08

## 2025-08-15 RX ADMIN — Medication 400 MILLIGRAM(S): at 00:51

## 2025-08-15 RX ADMIN — HEPARIN SODIUM 5000 UNIT(S): 1000 INJECTION INTRAVENOUS; SUBCUTANEOUS at 17:25

## 2025-08-15 RX ADMIN — LIDOCAINE HYDROCHLORIDE 1 PATCH: 20 JELLY TOPICAL at 07:25

## 2025-08-15 RX ADMIN — IPRATROPIUM BROMIDE AND ALBUTEROL SULFATE 3 MILLILITER(S): .5; 2.5 SOLUTION RESPIRATORY (INHALATION) at 00:48

## 2025-08-16 LAB
TSH SERPL-MCNC: 5.56 UIU/ML — HIGH (ref 0.27–4.2)
VIT B12 SERPL-MCNC: 409 PG/ML — SIGNIFICANT CHANGE UP (ref 232–1245)

## 2025-08-16 RX ORDER — POLYETHYLENE GLYCOL 3350 17 G/17G
17 POWDER, FOR SOLUTION ORAL DAILY
Refills: 0 | Status: DISCONTINUED | OUTPATIENT
Start: 2025-08-16 | End: 2025-08-18

## 2025-08-16 RX ORDER — SENNA 187 MG
2 TABLET ORAL AT BEDTIME
Refills: 0 | Status: DISCONTINUED | OUTPATIENT
Start: 2025-08-16 | End: 2025-08-18

## 2025-08-16 RX ORDER — QUETIAPINE FUMARATE 25 MG/1
50 TABLET ORAL AT BEDTIME
Refills: 0 | Status: DISCONTINUED | OUTPATIENT
Start: 2025-08-16 | End: 2025-08-17

## 2025-08-16 RX ORDER — OLANZAPINE 10 MG/1
2.5 TABLET ORAL ONCE
Refills: 0 | Status: COMPLETED | OUTPATIENT
Start: 2025-08-16 | End: 2025-08-16

## 2025-08-16 RX ADMIN — OLANZAPINE 2.5 MILLIGRAM(S): 10 TABLET ORAL at 18:21

## 2025-08-16 RX ADMIN — LIDOCAINE HYDROCHLORIDE 1 PATCH: 20 JELLY TOPICAL at 00:57

## 2025-08-16 RX ADMIN — QUETIAPINE FUMARATE 50 MILLIGRAM(S): 25 TABLET ORAL at 21:10

## 2025-08-16 RX ADMIN — HEPARIN SODIUM 5000 UNIT(S): 1000 INJECTION INTRAVENOUS; SUBCUTANEOUS at 05:33

## 2025-08-16 RX ADMIN — HEPARIN SODIUM 5000 UNIT(S): 1000 INJECTION INTRAVENOUS; SUBCUTANEOUS at 18:37

## 2025-08-16 RX ADMIN — Medication 2 TABLET(S): at 21:10

## 2025-08-16 RX ADMIN — MEMANTINE HYDROCHLORIDE 10 MILLIGRAM(S): 21 CAPSULE, EXTENDED RELEASE ORAL at 05:33

## 2025-08-16 RX ADMIN — LIDOCAINE HYDROCHLORIDE 1 PATCH: 20 JELLY TOPICAL at 12:00

## 2025-08-16 RX ADMIN — AMLODIPINE BESYLATE 10 MILLIGRAM(S): 10 TABLET ORAL at 05:33

## 2025-08-16 RX ADMIN — Medication 40 MILLIGRAM(S): at 05:33

## 2025-08-17 DIAGNOSIS — F32.9 MAJOR DEPRESSIVE DISORDER, SINGLE EPISODE, UNSPECIFIED: ICD-10-CM

## 2025-08-17 PROCEDURE — 99222 1ST HOSP IP/OBS MODERATE 55: CPT | Mod: GC

## 2025-08-17 PROCEDURE — 93010 ELECTROCARDIOGRAM REPORT: CPT

## 2025-08-17 RX ORDER — QUETIAPINE FUMARATE 25 MG/1
50 TABLET ORAL DAILY
Refills: 0 | Status: DISCONTINUED | OUTPATIENT
Start: 2025-08-17 | End: 2025-08-18

## 2025-08-17 RX ORDER — HALOPERIDOL 10 MG/1
1 TABLET ORAL ONCE
Refills: 0 | Status: COMPLETED | OUTPATIENT
Start: 2025-08-17 | End: 2025-08-17

## 2025-08-17 RX ORDER — HALOPERIDOL 10 MG/1
0.5 TABLET ORAL EVERY 6 HOURS
Refills: 0 | Status: DISCONTINUED | OUTPATIENT
Start: 2025-08-17 | End: 2025-08-18

## 2025-08-17 RX ADMIN — Medication 2 TABLET(S): at 21:11

## 2025-08-17 RX ADMIN — QUETIAPINE FUMARATE 50 MILLIGRAM(S): 25 TABLET ORAL at 17:30

## 2025-08-17 RX ADMIN — HEPARIN SODIUM 5000 UNIT(S): 1000 INJECTION INTRAVENOUS; SUBCUTANEOUS at 06:32

## 2025-08-17 RX ADMIN — AMLODIPINE BESYLATE 10 MILLIGRAM(S): 10 TABLET ORAL at 12:10

## 2025-08-17 RX ADMIN — Medication 650 MILLIGRAM(S): at 22:11

## 2025-08-17 RX ADMIN — HALOPERIDOL 1 MILLIGRAM(S): 10 TABLET ORAL at 03:54

## 2025-08-17 RX ADMIN — Medication 125 MILLIGRAM(S): at 21:10

## 2025-08-17 RX ADMIN — MEMANTINE HYDROCHLORIDE 10 MILLIGRAM(S): 21 CAPSULE, EXTENDED RELEASE ORAL at 12:10

## 2025-08-17 RX ADMIN — Medication 40 MILLIGRAM(S): at 12:10

## 2025-08-17 RX ADMIN — Medication 650 MILLIGRAM(S): at 21:11

## 2025-08-17 RX ADMIN — HEPARIN SODIUM 5000 UNIT(S): 1000 INJECTION INTRAVENOUS; SUBCUTANEOUS at 17:30

## 2025-08-17 RX ADMIN — Medication 125 MILLIGRAM(S): at 12:10

## 2025-08-17 RX ADMIN — MEMANTINE HYDROCHLORIDE 10 MILLIGRAM(S): 21 CAPSULE, EXTENDED RELEASE ORAL at 21:11

## 2025-08-18 ENCOUNTER — TRANSCRIPTION ENCOUNTER (OUTPATIENT)
Age: 89
End: 2025-08-18

## 2025-08-18 VITALS — RESPIRATION RATE: 18 BRPM | HEART RATE: 77 BPM | OXYGEN SATURATION: 91 %

## 2025-08-18 DIAGNOSIS — F32.A DEPRESSION, UNSPECIFIED: ICD-10-CM

## 2025-08-18 PROCEDURE — 82607 VITAMIN B-12: CPT

## 2025-08-18 PROCEDURE — 99285 EMERGENCY DEPT VISIT HI MDM: CPT

## 2025-08-18 PROCEDURE — 94640 AIRWAY INHALATION TREATMENT: CPT

## 2025-08-18 PROCEDURE — 97161 PT EVAL LOW COMPLEX 20 MIN: CPT

## 2025-08-18 PROCEDURE — 80048 BASIC METABOLIC PNL TOTAL CA: CPT

## 2025-08-18 PROCEDURE — 99231 SBSQ HOSP IP/OBS SF/LOW 25: CPT

## 2025-08-18 PROCEDURE — 71045 X-RAY EXAM CHEST 1 VIEW: CPT

## 2025-08-18 PROCEDURE — 84100 ASSAY OF PHOSPHORUS: CPT

## 2025-08-18 PROCEDURE — 85027 COMPLETE CBC AUTOMATED: CPT

## 2025-08-18 PROCEDURE — 80053 COMPREHEN METABOLIC PANEL: CPT

## 2025-08-18 PROCEDURE — 84443 ASSAY THYROID STIM HORMONE: CPT

## 2025-08-18 PROCEDURE — 73590 X-RAY EXAM OF LOWER LEG: CPT

## 2025-08-18 PROCEDURE — 83735 ASSAY OF MAGNESIUM: CPT

## 2025-08-18 PROCEDURE — 93005 ELECTROCARDIOGRAM TRACING: CPT

## 2025-08-18 RX ORDER — LIDOCAINE HYDROCHLORIDE 20 MG/ML
1 JELLY TOPICAL
Qty: 0 | Refills: 0 | DISCHARGE
Start: 2025-08-18

## 2025-08-18 RX ORDER — ACETAMINOPHEN 500 MG/5ML
1000 LIQUID (ML) ORAL EVERY 12 HOURS
Refills: 0 | Status: DISCONTINUED | OUTPATIENT
Start: 2025-08-18 | End: 2025-08-18

## 2025-08-18 RX ORDER — QUETIAPINE FUMARATE 25 MG/1
1 TABLET ORAL
Refills: 0 | DISCHARGE

## 2025-08-18 RX ORDER — LIDOCAINE HYDROCHLORIDE 20 MG/ML
1 JELLY TOPICAL
Qty: 30 | Refills: 0
Start: 2025-08-18 | End: 2025-09-16

## 2025-08-18 RX ORDER — AMLODIPINE BESYLATE 10 MG/1
1 TABLET ORAL
Refills: 0 | DISCHARGE

## 2025-08-18 RX ORDER — SENNA 187 MG
2 TABLET ORAL
Qty: 0 | Refills: 0 | DISCHARGE
Start: 2025-08-18

## 2025-08-18 RX ORDER — MEMANTINE HYDROCHLORIDE 21 MG/1
1 CAPSULE, EXTENDED RELEASE ORAL
Qty: 0 | Refills: 0 | DISCHARGE
Start: 2025-08-18

## 2025-08-18 RX ORDER — LIDOCAINE HYDROCHLORIDE 20 MG/ML
1 JELLY TOPICAL
Qty: 1 | Refills: 0
Start: 2025-08-18 | End: 2025-08-31

## 2025-08-18 RX ORDER — MEMANTINE HYDROCHLORIDE 21 MG/1
1 CAPSULE, EXTENDED RELEASE ORAL
Refills: 0 | DISCHARGE

## 2025-08-18 RX ORDER — POLYETHYLENE GLYCOL 3350 17 G/17G
17 POWDER, FOR SOLUTION ORAL
Qty: 238 | Refills: 0
Start: 2025-08-18 | End: 2025-08-31

## 2025-08-18 RX ORDER — ACETAMINOPHEN 500 MG/5ML
2 LIQUID (ML) ORAL
Qty: 0 | Refills: 0 | DISCHARGE
Start: 2025-08-18

## 2025-08-18 RX ORDER — SENNA 187 MG
2 TABLET ORAL
Qty: 28 | Refills: 0
Start: 2025-08-18 | End: 2025-08-31

## 2025-08-18 RX ORDER — AMLODIPINE BESYLATE 10 MG/1
1 TABLET ORAL
Qty: 0 | Refills: 0 | DISCHARGE
Start: 2025-08-18

## 2025-08-18 RX ORDER — ACETAMINOPHEN 500 MG/5ML
2 LIQUID (ML) ORAL
Qty: 12 | Refills: 0
Start: 2025-08-18 | End: 2025-08-20

## 2025-08-18 RX ORDER — QUETIAPINE FUMARATE 25 MG/1
1 TABLET ORAL
Qty: 0 | Refills: 0 | DISCHARGE
Start: 2025-08-18

## 2025-08-18 RX ADMIN — HEPARIN SODIUM 5000 UNIT(S): 1000 INJECTION INTRAVENOUS; SUBCUTANEOUS at 17:18

## 2025-08-18 RX ADMIN — AMLODIPINE BESYLATE 10 MILLIGRAM(S): 10 TABLET ORAL at 06:12

## 2025-08-18 RX ADMIN — Medication 40 MILLIGRAM(S): at 06:13

## 2025-08-18 RX ADMIN — QUETIAPINE FUMARATE 50 MILLIGRAM(S): 25 TABLET ORAL at 11:13

## 2025-08-18 RX ADMIN — MEMANTINE HYDROCHLORIDE 10 MILLIGRAM(S): 21 CAPSULE, EXTENDED RELEASE ORAL at 06:13

## 2025-08-18 RX ADMIN — Medication 1000 MILLIGRAM(S): at 17:17

## 2025-08-18 RX ADMIN — Medication 125 MILLIGRAM(S): at 06:13

## 2025-08-18 RX ADMIN — HEPARIN SODIUM 5000 UNIT(S): 1000 INJECTION INTRAVENOUS; SUBCUTANEOUS at 06:13

## 2025-08-18 RX ADMIN — Medication 125 MILLIGRAM(S): at 17:17

## 2025-08-18 RX ADMIN — MEMANTINE HYDROCHLORIDE 10 MILLIGRAM(S): 21 CAPSULE, EXTENDED RELEASE ORAL at 17:17
